# Patient Record
Sex: FEMALE | Race: WHITE | Employment: FULL TIME | URBAN - METROPOLITAN AREA
[De-identification: names, ages, dates, MRNs, and addresses within clinical notes are randomized per-mention and may not be internally consistent; named-entity substitution may affect disease eponyms.]

---

## 2018-06-29 ENCOUNTER — HOSPITAL ENCOUNTER (EMERGENCY)
Age: 33
Discharge: HOME | End: 2018-06-29
Attending: EMERGENCY MEDICINE
Payer: MEDICAID

## 2018-06-29 VITALS
TEMPERATURE: 98.7 F | BODY MASS INDEX: 33.75 KG/M2 | WEIGHT: 210 LBS | HEART RATE: 97 BPM | DIASTOLIC BLOOD PRESSURE: 80 MMHG | OXYGEN SATURATION: 97 % | HEIGHT: 66 IN | SYSTOLIC BLOOD PRESSURE: 120 MMHG | RESPIRATION RATE: 18 BRPM

## 2018-06-29 DIAGNOSIS — N20.1 LEFT URETERAL STONE: Primary | ICD-10-CM

## 2018-06-29 LAB
APPEARANCE UR: ABNORMAL
BACTERIA URNS QL MICRO: ABNORMAL /HPF
BILIRUB UR QL: NEGATIVE
COLOR UR: ABNORMAL
EPITH CASTS URNS QL MICRO: ABNORMAL /LPF
GLUCOSE UR STRIP.AUTO-MCNC: NEGATIVE MG/DL
HCG UR QL: NEGATIVE
HGB UR QL STRIP: ABNORMAL
KETONES UR QL STRIP.AUTO: ABNORMAL MG/DL
LEUKOCYTE ESTERASE UR QL STRIP.AUTO: ABNORMAL
NITRITE UR QL STRIP.AUTO: NEGATIVE
PH UR STRIP: 6.5 [PH] (ref 5–8)
PROT UR STRIP-MCNC: NEGATIVE MG/DL
RBC #/AREA URNS HPF: ABNORMAL /HPF (ref 0–5)
SP GR UR REFRACTOMETRY: 1.02 (ref 1–1.03)
UA: UC IF INDICATED,UAUC: ABNORMAL
UROBILINOGEN UR QL STRIP.AUTO: 0.2 EU/DL (ref 0.2–1)
WBC URNS QL MICRO: ABNORMAL /HPF (ref 0–4)

## 2018-06-29 PROCEDURE — 81001 URINALYSIS AUTO W/SCOPE: CPT | Performed by: EMERGENCY MEDICINE

## 2018-06-29 PROCEDURE — 96374 THER/PROPH/DIAG INJ IV PUSH: CPT

## 2018-06-29 PROCEDURE — 96361 HYDRATE IV INFUSION ADD-ON: CPT

## 2018-06-29 PROCEDURE — 74011250636 HC RX REV CODE- 250/636: Performed by: EMERGENCY MEDICINE

## 2018-06-29 PROCEDURE — 96375 TX/PRO/DX INJ NEW DRUG ADDON: CPT

## 2018-06-29 PROCEDURE — 99285 EMERGENCY DEPT VISIT HI MDM: CPT

## 2018-06-29 PROCEDURE — 81025 URINE PREGNANCY TEST: CPT

## 2018-06-29 PROCEDURE — 74011250636 HC RX REV CODE- 250/636

## 2018-06-29 PROCEDURE — 87086 URINE CULTURE/COLONY COUNT: CPT | Performed by: EMERGENCY MEDICINE

## 2018-06-29 RX ORDER — TAMSULOSIN HYDROCHLORIDE 0.4 MG/1
0.4 CAPSULE ORAL DAILY
COMMUNITY
End: 2021-01-28

## 2018-06-29 RX ORDER — OXYCODONE AND ACETAMINOPHEN 5; 325 MG/1; MG/1
1 TABLET ORAL
COMMUNITY
End: 2018-06-29

## 2018-06-29 RX ORDER — ONDANSETRON 4 MG/1
4 TABLET, FILM COATED ORAL
COMMUNITY
End: 2021-01-28

## 2018-06-29 RX ORDER — KETOROLAC TROMETHAMINE 30 MG/ML
30 INJECTION, SOLUTION INTRAMUSCULAR; INTRAVENOUS
Status: COMPLETED | OUTPATIENT
Start: 2018-06-29 | End: 2018-06-29

## 2018-06-29 RX ORDER — HYDROCODONE BITARTRATE AND ACETAMINOPHEN 5; 325 MG/1; MG/1
1 TABLET ORAL
Qty: 20 TAB | Refills: 0 | Status: SHIPPED | OUTPATIENT
Start: 2018-06-29 | End: 2021-01-28

## 2018-06-29 RX ORDER — FENTANYL CITRATE 50 UG/ML
INJECTION, SOLUTION INTRAMUSCULAR; INTRAVENOUS
Status: DISCONTINUED
Start: 2018-06-29 | End: 2018-06-30 | Stop reason: HOSPADM

## 2018-06-29 RX ORDER — ONDANSETRON 2 MG/ML
4 INJECTION INTRAMUSCULAR; INTRAVENOUS
Status: COMPLETED | OUTPATIENT
Start: 2018-06-29 | End: 2018-06-29

## 2018-06-29 RX ORDER — FENTANYL CITRATE 50 UG/ML
50 INJECTION, SOLUTION INTRAMUSCULAR; INTRAVENOUS ONCE
Status: COMPLETED | OUTPATIENT
Start: 2018-06-29 | End: 2018-06-29

## 2018-06-29 RX ORDER — ONDANSETRON 2 MG/ML
INJECTION INTRAMUSCULAR; INTRAVENOUS
Status: DISPENSED
Start: 2018-06-29 | End: 2018-06-30

## 2018-06-29 RX ORDER — KETOROLAC TROMETHAMINE 30 MG/ML
INJECTION, SOLUTION INTRAMUSCULAR; INTRAVENOUS
Status: DISPENSED
Start: 2018-06-29 | End: 2018-06-30

## 2018-06-29 RX ADMIN — SODIUM CHLORIDE 1000 ML: 900 INJECTION, SOLUTION INTRAVENOUS at 21:56

## 2018-06-29 RX ADMIN — ONDANSETRON 4 MG: 2 INJECTION INTRAMUSCULAR; INTRAVENOUS at 21:56

## 2018-06-29 RX ADMIN — FENTANYL CITRATE 50 MCG: 50 INJECTION, SOLUTION INTRAMUSCULAR; INTRAVENOUS at 22:43

## 2018-06-29 RX ADMIN — KETOROLAC TROMETHAMINE 30 MG: 30 INJECTION, SOLUTION INTRAMUSCULAR; INTRAVENOUS at 21:56

## 2018-06-30 NOTE — ED TRIAGE NOTES
Pt reports she was seen in [facility] ED last night and diagnosed with 7 mm kidney stone in left side. Pt unable to get into her urologist until Tuesday and the pain has worsened.

## 2018-06-30 NOTE — DISCHARGE INSTRUCTIONS

## 2018-06-30 NOTE — ED PROVIDER NOTES
HPI Comments: Patient is a 28year old female with a past medical history significant for kidney stones, CKD and a past surgical history of , Kidney Stone Removal who presents to the ED c/o constant, worsening, left flank pain which radiates to her LLQ today. Pt states that she first had the pain yesterday after lunch and was seen at [facility] last night. She was diagnosed with a 7mm kidney stone and prescribed zofran, flomax and percocet. She reports that she took a percocet this morning and her pain improved, but she has been having itching (side effect of perocet) so she is unable to tolerate anymore of the pain medicine. Pt states her pain is worse w/palpation and movement. She has contacted [facility] and they cannot see her until Tuesday. Additionally, pt c/o difficulty urinating,HA, decreased urination and nausea. She denies any fever, vomiting, diarrhea, cp, sob or any other acute sx. Pt is a current every day smoker, reports occasional EtOH. There are no additional medical complaints at this time. Signed by: terry Asencio for Massiel Mendoza on 2018. The history is provided by the patient. No  was used. No past medical history on file. No past surgical history on file. No family history on file. Social History     Social History    Marital status: N/A     Spouse name: N/A    Number of children: N/A    Years of education: N/A     Occupational History    Not on file. Social History Main Topics    Smoking status: Not on file    Smokeless tobacco: Not on file    Alcohol use Not on file    Drug use: Not on file    Sexual activity: Not on file     Other Topics Concern    Not on file     Social History Narrative         ALLERGIES: Ciprofloxacin    Review of Systems   Constitutional: Negative for chills and fever. HENT: Negative for congestion and sore throat.     Respiratory: Negative for cough and shortness of breath. Cardiovascular: Negative for chest pain, palpitations and leg swelling. Gastrointestinal: Negative for abdominal pain, blood in stool, constipation, diarrhea, nausea and vomiting. Genitourinary: Positive for decreased urine volume, difficulty urinating and flank pain. Negative for hematuria. Musculoskeletal: Negative for back pain. Neurological: Positive for syncope and headaches. Negative for numbness. All other systems reviewed and are negative. Vitals:    06/29/18 2126 06/29/18 2137   BP: 146/75    Pulse: 97    Resp: 18    Temp: 98.7 °F (37.1 °C)    SpO2: 99% 99%   Weight: 95.3 kg (210 lb)    Height: 5' 5.5\" (1.664 m)             Physical Exam   Constitutional: She is oriented to person, place, and time. She appears well-developed and well-nourished. She appears distressed. HENT:   Head: Normocephalic and atraumatic. Right Ear: External ear normal.   Left Ear: External ear normal.   Nose: Nose normal.   Mouth/Throat: Oropharynx is clear and moist.   Eyes: Conjunctivae and EOM are normal. Pupils are equal, round, and reactive to light. Right eye exhibits no discharge. Left eye exhibits no discharge. Neck: Normal range of motion. Neck supple. Cardiovascular: Normal rate, regular rhythm, normal heart sounds and intact distal pulses. Pulmonary/Chest: Effort normal and breath sounds normal.   Abdominal: Soft. Bowel sounds are normal. She exhibits no distension. There is tenderness in the left lower quadrant. There is no rebound and no guarding. Musculoskeletal: Normal range of motion. She exhibits no edema or tenderness. Neurological: She is alert and oriented to person, place, and time. No cranial nerve deficit. Coordination normal.   Skin: Skin is warm and dry. No rash noted. Psychiatric: She has a normal mood and affect. Her behavior is normal. Judgment and thought content normal.   Nursing note and vitals reviewed.        MDM      ED Course       Procedures    Progress Note:  Results, treatment, and follow up plan have been discussed with patient. Questions were answered. She feels better. Sohail Mcneil MD    Consult note - spoke with urology on call; agrees with plan for discharge; recommends she call the office at 0830 tomorrow morning. Sohail Mcneil MD     A/P: 7 mm left ureteral stone; pain controlled in ED; reassuring appearance and exam; VSS; home with urology f/u.   Sohail Mcneil MD

## 2018-06-30 NOTE — ED NOTES
Assumed care of pt from triage. Pt presents to ED with chief complaint of left flank pain. Pt is A&O x 4. Pt denies any other symptoms at this time. Pt resting comfortably on the stretcher in a position of comfort. Pt in no acute distress at this time. Call bell within reach. Side rails x 2. Cardiac monitor x 2. Stretcher locked in the lowest position. Pt aware of plan to await for MD/PA-C/NP assessment, and pt/family verbalizes understanding. Will continue to monitor.

## 2018-06-30 NOTE — ED NOTES
Dr. Cameron Blount gave and reviewed discharge instructions with the patient. The patient verbalized understanding. The patient was given opportunity for questions. Patient discharged in stable condition to the waiting room with male visitor.

## 2018-07-01 LAB
BACTERIA SPEC CULT: NORMAL
CC UR VC: NORMAL
SERVICE CMNT-IMP: NORMAL

## 2018-07-02 ENCOUNTER — ED HISTORICAL/CONVERTED ENCOUNTER (OUTPATIENT)
Dept: OTHER | Age: 33
End: 2018-07-02

## 2020-12-18 ENCOUNTER — HOSPITAL ENCOUNTER (EMERGENCY)
Age: 35
Discharge: HOME | End: 2020-12-19
Payer: MEDICAID

## 2020-12-18 ENCOUNTER — APPOINTMENT (OUTPATIENT)
Dept: CT IMAGING | Age: 35
End: 2020-12-18
Attending: NURSE PRACTITIONER
Payer: MEDICAID

## 2020-12-18 VITALS
RESPIRATION RATE: 21 BRPM | WEIGHT: 230 LBS | OXYGEN SATURATION: 98 % | TEMPERATURE: 99.1 F | HEIGHT: 66 IN | BODY MASS INDEX: 36.96 KG/M2 | SYSTOLIC BLOOD PRESSURE: 169 MMHG | DIASTOLIC BLOOD PRESSURE: 106 MMHG | HEART RATE: 109 BPM

## 2020-12-18 DIAGNOSIS — N13.5 OBSTRUCTION OF LEFT URETER: ICD-10-CM

## 2020-12-18 DIAGNOSIS — N13.30 HYDROURETERONEPHROSIS: ICD-10-CM

## 2020-12-18 DIAGNOSIS — N20.0 NEPHROLITHIASIS: ICD-10-CM

## 2020-12-18 DIAGNOSIS — R10.9 ACUTE LEFT FLANK PAIN: Primary | ICD-10-CM

## 2020-12-18 LAB
ALBUMIN SERPL-MCNC: 3.8 G/DL (ref 3.5–5)
ALBUMIN/GLOB SERPL: 1 {RATIO} (ref 1.1–2.2)
ALP SERPL-CCNC: 109 U/L (ref 45–117)
ALT SERPL-CCNC: 15 U/L (ref 12–78)
ANION GAP SERPL CALC-SCNC: 7 MMOL/L (ref 5–15)
ANION GAP SERPL CALC-SCNC: 7 MMOL/L (ref 5–15)
APPEARANCE UR: ABNORMAL
AST SERPL W P-5'-P-CCNC: 16 U/L (ref 15–37)
BACTERIA URNS QL MICRO: NEGATIVE /HPF
BASOPHILS # BLD: 0 K/UL (ref 0–0.1)
BASOPHILS NFR BLD: 0 % (ref 0–1)
BILIRUB SERPL-MCNC: 0.3 MG/DL (ref 0.2–1)
BILIRUB UR QL: NEGATIVE
BUN SERPL-MCNC: 16 MG/DL (ref 6–20)
BUN SERPL-MCNC: 17 MG/DL (ref 6–20)
BUN/CREAT SERPL: 15 (ref 12–20)
BUN/CREAT SERPL: 15 (ref 12–20)
CA-I BLD-MCNC: 9.8 MG/DL (ref 8.5–10.1)
CA-I BLD-MCNC: 9.8 MG/DL (ref 8.5–10.1)
CHLORIDE SERPL-SCNC: 108 MMOL/L (ref 97–108)
CHLORIDE SERPL-SCNC: 108 MMOL/L (ref 97–108)
CO2 SERPL-SCNC: 25 MMOL/L (ref 21–32)
CO2 SERPL-SCNC: 25 MMOL/L (ref 21–32)
COLOR UR: ABNORMAL
CREAT SERPL-MCNC: 1.1 MG/DL (ref 0.55–1.02)
CREAT SERPL-MCNC: 1.16 MG/DL (ref 0.55–1.02)
DIFFERENTIAL METHOD BLD: ABNORMAL
EOSINOPHIL # BLD: 0.6 K/UL (ref 0–0.4)
EOSINOPHIL NFR BLD: 4 % (ref 0–7)
ERYTHROCYTE [DISTWIDTH] IN BLOOD BY AUTOMATED COUNT: 16.2 % (ref 11.5–14.5)
GLOBULIN SER CALC-MCNC: 4 G/DL (ref 2–4)
GLUCOSE SERPL-MCNC: 147 MG/DL (ref 65–100)
GLUCOSE SERPL-MCNC: 147 MG/DL (ref 65–100)
GLUCOSE UR STRIP.AUTO-MCNC: NEGATIVE MG/DL
HCT VFR BLD AUTO: 39.1 % (ref 35–47)
HGB BLD-MCNC: 12.1 G/DL (ref 11.5–16)
HGB UR QL STRIP: ABNORMAL
IMM GRANULOCYTES # BLD AUTO: 0 K/UL (ref 0–0.04)
IMM GRANULOCYTES NFR BLD AUTO: 0 % (ref 0–0.5)
KETONES UR QL STRIP.AUTO: NEGATIVE MG/DL
LEUKOCYTE ESTERASE UR QL STRIP.AUTO: ABNORMAL
LYMPHOCYTES # BLD: 2.6 K/UL (ref 0.8–3.5)
LYMPHOCYTES NFR BLD: 19 % (ref 12–49)
MCH RBC QN AUTO: 25.3 PG (ref 26–34)
MCHC RBC AUTO-ENTMCNC: 30.9 G/DL (ref 30–36.5)
MCV RBC AUTO: 81.8 FL (ref 80–99)
MONOCYTES # BLD: 0.8 K/UL (ref 0–1)
MONOCYTES NFR BLD: 6 % (ref 5–13)
MUCOUS THREADS URNS QL MICRO: ABNORMAL /LPF
NEUTS SEG # BLD: 10.2 K/UL (ref 1.8–8)
NEUTS SEG NFR BLD: 71 % (ref 32–75)
NITRITE UR QL STRIP.AUTO: NEGATIVE
PH UR STRIP: 5 [PH] (ref 5–8)
PLATELET # BLD AUTO: 401 K/UL (ref 150–400)
PMV BLD AUTO: 10.7 FL (ref 8.9–12.9)
POTASSIUM SERPL-SCNC: 3.8 MMOL/L (ref 3.5–5.1)
POTASSIUM SERPL-SCNC: 3.9 MMOL/L (ref 3.5–5.1)
PROT SERPL-MCNC: 7.8 G/DL (ref 6.4–8.2)
PROT UR STRIP-MCNC: 30 MG/DL
RBC # BLD AUTO: 4.78 M/UL (ref 3.8–5.2)
RBC #/AREA URNS HPF: ABNORMAL /HPF (ref 0–5)
SODIUM SERPL-SCNC: 140 MMOL/L (ref 136–145)
SODIUM SERPL-SCNC: 140 MMOL/L (ref 136–145)
SP GR UR REFRACTOMETRY: 1.02 (ref 1–1.03)
UA: UC IF INDICATED,UAUC: ABNORMAL
UROBILINOGEN UR QL STRIP.AUTO: 0.1 EU/DL (ref 0.1–1)
WBC # BLD AUTO: 14.3 K/UL (ref 3.6–11)
WBC URNS QL MICRO: ABNORMAL /HPF (ref 0–4)

## 2020-12-18 PROCEDURE — 81001 URINALYSIS AUTO W/SCOPE: CPT

## 2020-12-18 PROCEDURE — 99283 EMERGENCY DEPT VISIT LOW MDM: CPT

## 2020-12-18 PROCEDURE — 96374 THER/PROPH/DIAG INJ IV PUSH: CPT

## 2020-12-18 PROCEDURE — 74011250636 HC RX REV CODE- 250/636: Performed by: NURSE PRACTITIONER

## 2020-12-18 PROCEDURE — 80053 COMPREHEN METABOLIC PANEL: CPT

## 2020-12-18 PROCEDURE — 85025 COMPLETE CBC W/AUTO DIFF WBC: CPT

## 2020-12-18 PROCEDURE — 36415 COLL VENOUS BLD VENIPUNCTURE: CPT

## 2020-12-18 PROCEDURE — 87086 URINE CULTURE/COLONY COUNT: CPT

## 2020-12-18 PROCEDURE — 84703 CHORIONIC GONADOTROPIN ASSAY: CPT

## 2020-12-18 PROCEDURE — 96375 TX/PRO/DX INJ NEW DRUG ADDON: CPT

## 2020-12-18 PROCEDURE — 80048 BASIC METABOLIC PNL TOTAL CA: CPT

## 2020-12-18 RX ORDER — ONDANSETRON 2 MG/ML
4 INJECTION INTRAMUSCULAR; INTRAVENOUS
Status: COMPLETED | OUTPATIENT
Start: 2020-12-18 | End: 2020-12-18

## 2020-12-18 RX ORDER — SODIUM CHLORIDE 0.9 % (FLUSH) 0.9 %
5-40 SYRINGE (ML) INJECTION AS NEEDED
Status: DISCONTINUED | OUTPATIENT
Start: 2020-12-18 | End: 2020-12-19 | Stop reason: HOSPADM

## 2020-12-18 RX ORDER — SODIUM CHLORIDE 0.9 % (FLUSH) 0.9 %
5-40 SYRINGE (ML) INJECTION EVERY 8 HOURS
Status: DISCONTINUED | OUTPATIENT
Start: 2020-12-18 | End: 2020-12-19 | Stop reason: HOSPADM

## 2020-12-18 RX ORDER — KETOROLAC TROMETHAMINE 30 MG/ML
15 INJECTION, SOLUTION INTRAMUSCULAR; INTRAVENOUS
Status: COMPLETED | OUTPATIENT
Start: 2020-12-18 | End: 2020-12-18

## 2020-12-18 RX ADMIN — Medication 10 ML: at 22:35

## 2020-12-18 RX ADMIN — ONDANSETRON 4 MG: 2 INJECTION INTRAMUSCULAR; INTRAVENOUS at 22:34

## 2020-12-18 RX ADMIN — Medication 10 ML: at 20:00

## 2020-12-18 RX ADMIN — KETOROLAC TROMETHAMINE 15 MG: 30 INJECTION, SOLUTION INTRAMUSCULAR at 22:33

## 2020-12-19 ENCOUNTER — APPOINTMENT (OUTPATIENT)
Dept: CT IMAGING | Age: 35
End: 2020-12-19
Attending: NURSE PRACTITIONER
Payer: MEDICAID

## 2020-12-19 LAB — HCG SERPL QL: NEGATIVE

## 2020-12-19 PROCEDURE — 74176 CT ABD & PELVIS W/O CONTRAST: CPT

## 2020-12-19 RX ORDER — TAMSULOSIN HYDROCHLORIDE 0.4 MG/1
0.4 CAPSULE ORAL DAILY
Qty: 15 CAP | Refills: 0 | Status: SHIPPED | OUTPATIENT
Start: 2020-12-19 | End: 2021-01-03

## 2020-12-19 RX ORDER — OXYCODONE HYDROCHLORIDE 5 MG/1
5 TABLET ORAL
Qty: 12 TAB | Refills: 0 | Status: SHIPPED | OUTPATIENT
Start: 2020-12-19 | End: 2020-12-22

## 2020-12-19 RX ORDER — CEPHALEXIN 500 MG/1
500 CAPSULE ORAL
Status: DISCONTINUED | OUTPATIENT
Start: 2020-12-19 | End: 2020-12-19 | Stop reason: HOSPADM

## 2020-12-19 RX ORDER — CEPHALEXIN 500 MG/1
500 CAPSULE ORAL 2 TIMES DAILY
Qty: 14 CAP | Refills: 0 | Status: SHIPPED | OUTPATIENT
Start: 2020-12-19 | End: 2020-12-26

## 2020-12-19 RX ORDER — IBUPROFEN 800 MG/1
800 TABLET ORAL
Qty: 30 TAB | Refills: 0 | Status: SHIPPED | OUTPATIENT
Start: 2020-12-19 | End: 2021-01-28

## 2020-12-19 RX ORDER — ONDANSETRON 4 MG/1
4 TABLET, ORALLY DISINTEGRATING ORAL
Qty: 10 TAB | Refills: 0 | Status: SHIPPED | OUTPATIENT
Start: 2020-12-19 | End: 2021-01-28

## 2020-12-19 NOTE — DISCHARGE INSTRUCTIONS
Thank you! Thank you for allowing me to care for you in the emergency department. I sincerely hope that you are satisfied with your visit today. It is my goal to provide you with excellent care. Below you will find a list of your labs and imaging from your visit today. Should you have any questions regarding these results please do not hesitate to call the emergency department.     Labs -     Recent Results (from the past 12 hour(s))   URINALYSIS W/ REFLEX CULTURE    Collection Time: 12/18/20 10:00 PM    Specimen: Urine   Result Value Ref Range    Color Yellow/Straw      Appearance Turbid (A) Clear      Specific gravity 1.019 1.003 - 1.030      pH (UA) 5.0 5.0 - 8.0      Protein 30 (A) Negative mg/dL    Glucose Negative Negative mg/dL    Ketone Negative Negative mg/dL    Bilirubin Negative Negative      Blood Moderate (A) Negative      Urobilinogen 0.1 0.1 - 1.0 EU/dL    Nitrites Negative Negative      Leukocyte Esterase Small (A) Negative      UA:UC IF INDICATED Urine Culture Ordered (A) Culture not indicated by UA result      WBC 20-50 0 - 4 /hpf    RBC  0 - 5 /hpf    Bacteria Negative Negative /hpf    Mucus Trace /lpf   METABOLIC PANEL, BASIC    Collection Time: 12/18/20 10:16 PM   Result Value Ref Range    Sodium 140 136 - 145 mmol/L    Potassium 3.8 3.5 - 5.1 mmol/L    Chloride 108 97 - 108 mmol/L    CO2 25 21 - 32 mmol/L    Anion gap 7 5 - 15 mmol/L    Glucose 147 (H) 65 - 100 mg/dL    BUN 16 6 - 20 mg/dL    Creatinine 1.10 (H) 0.55 - 1.02 mg/dL    BUN/Creatinine ratio 15 12 - 20      GFR est AA >60 >60 ml/min/1.73m2    GFR est non-AA 57 (L) >60 ml/min/1.73m2    Calcium 9.8 8.5 - 10.1 mg/dL   CBC WITH AUTOMATED DIFF    Collection Time: 12/18/20 10:16 PM   Result Value Ref Range    WBC 14.3 (H) 3.6 - 11.0 K/uL    RBC 4.78 3.80 - 5.20 M/uL    HGB 12.1 11.5 - 16.0 g/dL    HCT 39.1 35.0 - 47.0 %    MCV 81.8 80.0 - 99.0 FL    MCH 25.3 (L) 26.0 - 34.0 PG    MCHC 30.9 30.0 - 36.5 g/dL    RDW 16.2 (H) 11.5 - 14.5 %    PLATELET 047 (H) 220 - 400 K/uL    MPV 10.7 8.9 - 12.9 FL    NEUTROPHILS 71 32 - 75 %    LYMPHOCYTES 19 12 - 49 %    MONOCYTES 6 5 - 13 %    EOSINOPHILS 4 0 - 7 %    BASOPHILS 0 0 - 1 %    IMMATURE GRANULOCYTES 0 0.0 - 0.5 %    ABS. NEUTROPHILS 10.2 (H) 1.8 - 8.0 K/UL    ABS. LYMPHOCYTES 2.6 0.8 - 3.5 K/UL    ABS. MONOCYTES 0.8 0.0 - 1.0 K/UL    ABS. EOSINOPHILS 0.6 (H) 0.0 - 0.4 K/UL    ABS. BASOPHILS 0.0 0.0 - 0.1 K/UL    ABS. IMM. GRANS. 0.0 0.00 - 0.04 K/UL    DF AUTOMATED     HCG QL SERUM    Collection Time: 12/18/20 10:16 PM   Result Value Ref Range    HCG, Ql. Negative Negative     METABOLIC PANEL, COMPREHENSIVE    Collection Time: 12/18/20 10:50 PM   Result Value Ref Range    Sodium 140 136 - 145 mmol/L    Potassium 3.9 3.5 - 5.1 mmol/L    Chloride 108 97 - 108 mmol/L    CO2 25 21 - 32 mmol/L    Anion gap 7 5 - 15 mmol/L    Glucose 147 (H) 65 - 100 mg/dL    BUN 17 6 - 20 mg/dL    Creatinine 1.16 (H) 0.55 - 1.02 mg/dL    BUN/Creatinine ratio 15 12 - 20      GFR est AA >60 >60 ml/min/1.73m2    GFR est non-AA 53 (L) >60 ml/min/1.73m2    Calcium 9.8 8.5 - 10.1 mg/dL    Bilirubin, total 0.3 0.2 - 1.0 mg/dL    AST (SGOT) 16 15 - 37 U/L    ALT (SGPT) 15 12 - 78 U/L    Alk. phosphatase 109 45 - 117 U/L    Protein, total 7.8 6.4 - 8.2 g/dL    Albumin 3.8 3.5 - 5.0 g/dL    Globulin 4.0 2.0 - 4.0 g/dL    A-G Ratio 1.0 (L) 1.1 - 2.2         Radiologic Studies -   CT ABD PELV WO CONT   Final Result   IMPRESSION: Obstructing stone in the proximal left ureter as described and with   multiple other bilateral renal calculi.  Based on the current size of the   obstructing ureteral calculus urology consult may be needed for retrieval.   Additionally the patient's overall stone disease has significantly increased   since the prior exam.        CT Results  (Last 48 hours)                 12/19/20 0101  CT ABD PELV WO CONT Final result    Impression:  IMPRESSION: Obstructing stone in the proximal left ureter as described and with   multiple other bilateral renal calculi. Based on the current size of the   obstructing ureteral calculus urology consult may be needed for retrieval.   Additionally the patient's overall stone disease has significantly increased   since the prior exam.       Narrative:  HISTORY:   flank pain; flank pain     Dose reduction technique: All CT scans at this facility are performed using dose reduction optimization   technique as appropriate on the exam including the following: Automated exposure   control, adjustment of the MA and/or KV according to patient size of use of   iterative reconstructive technique. .       TECHNIQUE: CT of the abdomen and pelvis without contrast   COMPARISON: None   LIMITATIONS: None       CHEST: No acute airspace process or pleural effusion seen at the lung bases. LIVER: Normal.        GALLBLADDER: Normal.        BILIARY TREE: Normal.           PANCREAS: Normal.            SPLEEN: Normal.           ADRENAL GLANDS: Normal.       KIDNEYS/URETERS/BLADDER: There is a 5.6 mm maximal transaxial diameter stone in   the proximal left ureter with moderate left hydroureteronephrosis and mild left   perinephric/periureteral stranding. There are multiple other bilateral renal   calculi present and overall stone disease has increased since the prior exam   with multiple new stones in the right kidney and increase in the size of the   stones of the left kidney. The largest stone in the left kidney is present in   the inferior pole and measures up to 6.5 mm. Right kidney/ureter negative for   obstruction. Urinary bladder is collapsed and otherwise unremarkable. RETROPERITONEUM/AORTA: Normal.      BOWEL/MESENTERY: Normal.         APPENDIX: Identified and normal.         PERITONEAL CAVITY: Normal.          REPRODUCTIVE ORGANS: Normal. Bilateral tubal ligation clips noted       BONE/TISSUES: No acute abnormality. OTHER: None.                              CXR Results  (Last 48 hours)      None               If you feel that you have not received excellent quality care or timely care, please ask to speak to the nurse manager. Please choose us in the future for your continued health care needs. ------------------------------------------------------------------------------------------------------------  The exam and treatment you received in the Emergency Department were for an urgent problem and are not intended as complete care. It is important that you follow-up with a doctor, nurse practitioner, or physician assistant to:  (1) confirm your diagnosis,  (2) re-evaluation of changes in your illness and treatment, and  (3) for ongoing care. If your symptoms become worse or you do not improve as expected and you are unable to reach your usual health care provider, you should return to the Emergency Department. We are available 24 hours a day. Please take your discharge instructions with you when you go to your follow-up appointment. If you have any problem arranging a follow-up appointment, contact the Emergency Department immediately. If a prescription has been provided, please have it filled as soon as possible to prevent a delay in treatment. Read the entire medication instruction sheet provided to you by the pharmacy. If you have any questions or reservations about taking the medication due to side effects or interactions with other medications, please call your primary care physician or contact the ER to speak with the charge nurse. Make an appointment with your family doctor or the physician you were referred to for follow-up of this visit as instructed on your discharge paperwork, as this is a mandatory follow-up. Return to the ER if you are unable to be seen or if you are unable to be seen in a timely manner. If you have any problem arranging the follow-up visit, contact the Emergency Department immediately.

## 2020-12-19 NOTE — ED TRIAGE NOTES
Patient states 3 weeks ago she was told he had a 7mm Kidney stone. Pain improved till today. Patient complains of left flank pain radiating to the abdomen.  Denies blood in the urine

## 2020-12-19 NOTE — ED PROVIDER NOTES
EMERGENCY DEPARTMENT HISTORY AND PHYSICAL EXAM      Date: 2020  Patient Name: Karon Sneed      History of Presenting Illness     Chief Complaint   Patient presents with    Flank Pain       History Provided By: Patient    HPI: Karon Sneed, 28 y.o. female with a past medical history significant Kidney stones, , tubal ligation, lithotripsy presents to the ED with cc of left-sided flank pain that radiates to her abdomen and nausea. She reports pain is 9 out of 10, constant, sharp and nagging. Reports pain started today. Denies anything making it better or worse. Reports being told she had a 7 mm kidney stone around gi. Denies being able to follow-up with kidney stone hotline or urologist.  She denies any burning, frequency, urgency, fever, chills. She reports use of Motrin Aleve and Tylenol as needed with no relief. Off note patient reports history of kidney stones requiring lithotripsy. Patient requesting additional imaging to rule out any type of obstruction at this time. There are no other complaints, changes, or physical findings at this time. PCP: Gretchen Cherry MD    Current Facility-Administered Medications   Medication Dose Route Frequency Provider Last Rate Last Admin    cephALEXin (KEFLEX) capsule 500 mg  500 mg Oral NOW Kat Jacques, NP        sodium chloride (NS) flush 5-40 mL  5-40 mL IntraVENous Q8H Janel PERES MD   10 mL at 20    sodium chloride (NS) flush 5-40 mL  5-40 mL IntraVENous PRN Hernán Lizarraga MD   10 mL at 20     Current Outpatient Medications   Medication Sig Dispense Refill    cephALEXin (Keflex) 500 mg capsule Take 1 Cap by mouth two (2) times a day for 7 days. 14 Cap 0    oxyCODONE IR (Roxicodone) 5 mg immediate release tablet Take 1 Tab by mouth every six (6) hours as needed for Pain for up to 3 days.  Max Daily Amount: 20 mg. 12 Tab 0    ibuprofen (MOTRIN) 800 mg tablet Take 1 Tab by mouth every eight (8) hours as needed for Pain. 30 Tab 0    ondansetron (Zofran ODT) 4 mg disintegrating tablet Take 1 Tab by mouth every eight (8) hours as needed for Nausea. 10 Tab 0    tamsulosin (Flomax) 0.4 mg capsule Take 1 Cap by mouth daily for 15 days. 15 Cap 0    tamsulosin (FLOMAX) 0.4 mg capsule Take 0.4 mg by mouth daily.  ondansetron hcl (ZOFRAN) 4 mg tablet Take 4 mg by mouth every six (6) hours as needed for Nausea.  HYDROcodone-acetaminophen (NORCO) 5-325 mg per tablet Take 1 Tab by mouth every four (4) hours as needed for Pain. Max Daily Amount: 6 Tabs. 20 Tab 0       Past History     Past Medical History:  Past Medical History:   Diagnosis Date    Kidney calculi        Past Surgical History:  Past Surgical History:   Procedure Laterality Date    HX  SECTION         Family History:  History reviewed. No pertinent family history. Social History:  Social History     Tobacco Use    Smoking status: Current Every Day Smoker     Packs/day: 0.50     Years: 10.00     Pack years: 5.00    Smokeless tobacco: Current User   Substance Use Topics    Alcohol use: No     Comment: rarely    Drug use: No       Allergies: Allergies   Allergen Reactions    Ciprofloxacin Other (comments)     Thrush         Review of Systems     Review of Systems   Constitutional: Negative for chills and fever. Respiratory: Negative for cough and shortness of breath. Cardiovascular: Negative for chest pain. Gastrointestinal: Positive for abdominal pain. Genitourinary: Positive for flank pain. Negative for difficulty urinating, dysuria, frequency and hematuria. Musculoskeletal: Positive for back pain. Skin: Negative. Physical Exam     Physical Exam  Constitutional:       General: She is not in acute distress. Appearance: Normal appearance. She is not ill-appearing. HENT:      Head: Normocephalic and atraumatic.       Mouth/Throat:      Mouth: Mucous membranes are moist.   Eyes:      Extraocular Movements: Extraocular movements intact. Cardiovascular:      Rate and Rhythm: Normal rate and regular rhythm. Pulses: Normal pulses. Pulmonary:      Effort: Pulmonary effort is normal.   Abdominal:      General: Bowel sounds are normal.      Palpations: Abdomen is soft. Tenderness: There is abdominal tenderness in the left upper quadrant. There is left CVA tenderness. There is no right CVA tenderness. Musculoskeletal: Normal range of motion. Skin:     General: Skin is warm and dry. Capillary Refill: Capillary refill takes less than 2 seconds. Neurological:      Mental Status: She is alert and oriented to person, place, and time.          Lab and Diagnostic Study Results     Labs -     Recent Results (from the past 12 hour(s))   URINALYSIS W/ REFLEX CULTURE    Collection Time: 12/18/20 10:00 PM    Specimen: Urine   Result Value Ref Range    Color Yellow/Straw      Appearance Turbid (A) Clear      Specific gravity 1.019 1.003 - 1.030      pH (UA) 5.0 5.0 - 8.0      Protein 30 (A) Negative mg/dL    Glucose Negative Negative mg/dL    Ketone Negative Negative mg/dL    Bilirubin Negative Negative      Blood Moderate (A) Negative      Urobilinogen 0.1 0.1 - 1.0 EU/dL    Nitrites Negative Negative      Leukocyte Esterase Small (A) Negative      UA:UC IF INDICATED Urine Culture Ordered (A) Culture not indicated by UA result      WBC 20-50 0 - 4 /hpf    RBC  0 - 5 /hpf    Bacteria Negative Negative /hpf    Mucus Trace /lpf   METABOLIC PANEL, BASIC    Collection Time: 12/18/20 10:16 PM   Result Value Ref Range    Sodium 140 136 - 145 mmol/L    Potassium 3.8 3.5 - 5.1 mmol/L    Chloride 108 97 - 108 mmol/L    CO2 25 21 - 32 mmol/L    Anion gap 7 5 - 15 mmol/L    Glucose 147 (H) 65 - 100 mg/dL    BUN 16 6 - 20 mg/dL    Creatinine 1.10 (H) 0.55 - 1.02 mg/dL    BUN/Creatinine ratio 15 12 - 20      GFR est AA >60 >60 ml/min/1.73m2    GFR est non-AA 57 (L) >60 ml/min/1.73m2    Calcium 9.8 8.5 - 10.1 mg/dL   CBC WITH AUTOMATED DIFF    Collection Time: 12/18/20 10:16 PM   Result Value Ref Range    WBC 14.3 (H) 3.6 - 11.0 K/uL    RBC 4.78 3.80 - 5.20 M/uL    HGB 12.1 11.5 - 16.0 g/dL    HCT 39.1 35.0 - 47.0 %    MCV 81.8 80.0 - 99.0 FL    MCH 25.3 (L) 26.0 - 34.0 PG    MCHC 30.9 30.0 - 36.5 g/dL    RDW 16.2 (H) 11.5 - 14.5 %    PLATELET 528 (H) 436 - 400 K/uL    MPV 10.7 8.9 - 12.9 FL    NEUTROPHILS 71 32 - 75 %    LYMPHOCYTES 19 12 - 49 %    MONOCYTES 6 5 - 13 %    EOSINOPHILS 4 0 - 7 %    BASOPHILS 0 0 - 1 %    IMMATURE GRANULOCYTES 0 0.0 - 0.5 %    ABS. NEUTROPHILS 10.2 (H) 1.8 - 8.0 K/UL    ABS. LYMPHOCYTES 2.6 0.8 - 3.5 K/UL    ABS. MONOCYTES 0.8 0.0 - 1.0 K/UL    ABS. EOSINOPHILS 0.6 (H) 0.0 - 0.4 K/UL    ABS. BASOPHILS 0.0 0.0 - 0.1 K/UL    ABS. IMM. GRANS. 0.0 0.00 - 0.04 K/UL    DF AUTOMATED     HCG QL SERUM    Collection Time: 12/18/20 10:16 PM   Result Value Ref Range    HCG, Ql. Negative Negative     METABOLIC PANEL, COMPREHENSIVE    Collection Time: 12/18/20 10:50 PM   Result Value Ref Range    Sodium 140 136 - 145 mmol/L    Potassium 3.9 3.5 - 5.1 mmol/L    Chloride 108 97 - 108 mmol/L    CO2 25 21 - 32 mmol/L    Anion gap 7 5 - 15 mmol/L    Glucose 147 (H) 65 - 100 mg/dL    BUN 17 6 - 20 mg/dL    Creatinine 1.16 (H) 0.55 - 1.02 mg/dL    BUN/Creatinine ratio 15 12 - 20      GFR est AA >60 >60 ml/min/1.73m2    GFR est non-AA 53 (L) >60 ml/min/1.73m2    Calcium 9.8 8.5 - 10.1 mg/dL    Bilirubin, total 0.3 0.2 - 1.0 mg/dL    AST (SGOT) 16 15 - 37 U/L    ALT (SGPT) 15 12 - 78 U/L    Alk. phosphatase 109 45 - 117 U/L    Protein, total 7.8 6.4 - 8.2 g/dL    Albumin 3.8 3.5 - 5.0 g/dL    Globulin 4.0 2.0 - 4.0 g/dL    A-G Ratio 1.0 (L) 1.1 - 2.2         Radiologic Studies -   [unfilled]  CT Results  (Last 48 hours)               12/19/20 0101  CT ABD PELV WO CONT Final result    Impression:  IMPRESSION: Obstructing stone in the proximal left ureter as described and with   multiple other bilateral renal calculi. Based on the current size of the   obstructing ureteral calculus urology consult may be needed for retrieval.   Additionally the patient's overall stone disease has significantly increased   since the prior exam.       Narrative:  HISTORY:   flank pain; flank pain     Dose reduction technique: All CT scans at this facility are performed using dose reduction optimization   technique as appropriate on the exam including the following: Automated exposure   control, adjustment of the MA and/or KV according to patient size of use of   iterative reconstructive technique. .       TECHNIQUE: CT of the abdomen and pelvis without contrast   COMPARISON: None   LIMITATIONS: None       CHEST: No acute airspace process or pleural effusion seen at the lung bases. LIVER: Normal.        GALLBLADDER: Normal.        BILIARY TREE: Normal.           PANCREAS: Normal.            SPLEEN: Normal.           ADRENAL GLANDS: Normal.       KIDNEYS/URETERS/BLADDER: There is a 5.6 mm maximal transaxial diameter stone in   the proximal left ureter with moderate left hydroureteronephrosis and mild left   perinephric/periureteral stranding. There are multiple other bilateral renal   calculi present and overall stone disease has increased since the prior exam   with multiple new stones in the right kidney and increase in the size of the   stones of the left kidney. The largest stone in the left kidney is present in   the inferior pole and measures up to 6.5 mm. Right kidney/ureter negative for   obstruction. Urinary bladder is collapsed and otherwise unremarkable. RETROPERITONEUM/AORTA: Normal.      BOWEL/MESENTERY: Normal.         APPENDIX: Identified and normal.         PERITONEAL CAVITY: Normal.          REPRODUCTIVE ORGANS: Normal. Bilateral tubal ligation clips noted       BONE/TISSUES: No acute abnormality. OTHER: None.                            CXR Results  (Last 48 hours)    None          Medical Decision Making and ED Course   - I am the first and primary provider for this patient AND AM THE PRIMARY PROVIDER OF RECORD. - I reviewed the vital signs, available nursing notes, past medical history, past surgical history, family history and social history. - Initial assessment performed. The patients presenting problems have been discussed, and the staff are in agreement with the care plan formulated and outlined with them. I have encouraged them to ask questions as they arise throughout their visit. Vital Signs-Reviewed the patient's vital signs. Patient Vitals for the past 12 hrs:   Temp Pulse Resp BP SpO2   12/18/20 2141 99.1 °F (37.3 °C) (!) 109 21 (!) 169/106 98 %         The patient presents with flank pain with a differential diagnosis of pylonephritis, hydronephritis, renal calculi, UTI    ED Course:              Provider Notes (Medical Decision Making):   CT noted with positive left ureter obstruction. UA with blood noted mild leukocytes. WBC 14.  Patient denies any fever, chills. Positive CVA tenderness on left side. Patient with past medical history of kidney stones recurrent, recent during Thanksgiving however denies follow-up with urologist or kidney stone hotline. Advised of pain management, supportive care, follow-up with urologist outpatient. Patient given Flomax and antibiotics prophylactic. During ED visit patient complains of pain 9 out of 10 treated with Toradol with minimal relief. Patient was offered use of narcotics for pain management however ,patient reports currently driving. Patient was advised to have someone pick her up or she can assist stay for a few hours and rest until the morning deferred the option to have someone pick her up or to stay for several hours to be monitored for pain management. Advised of signs and symptoms of when to return to the emergency room. Patient verbalized understanding stable at time of discharge.           Consultations: Consultations:         Procedures and Metsa 36, NP        Disposition     Disposition: Discharge      DISCHARGE PLAN:  1. Current Discharge Medication List      CONTINUE these medications which have NOT CHANGED    Details   tamsulosin (FLOMAX) 0.4 mg capsule Take 0.4 mg by mouth daily. ondansetron hcl (ZOFRAN) 4 mg tablet Take 4 mg by mouth every six (6) hours as needed for Nausea. HYDROcodone-acetaminophen (NORCO) 5-325 mg per tablet Take 1 Tab by mouth every four (4) hours as needed for Pain. Max Daily Amount: 6 Tabs. Qty: 20 Tab, Refills: 0    Associated Diagnoses: Left ureteral stone           2. Follow-up Information     Follow up With Specialties Details Why Contact Info    Jason Amaya MD Internal Medicine Schedule an appointment as soon as possible for a visit   [place] 1998 [id number]  [address] [facility]  Schedule an appointment as soon as possible for a visit   [address]  Schedule an appointment as soon as possible for a visit   [phone]        3. Return to ED if worse   4. Current Discharge Medication List      START taking these medications    Details   cephALEXin (Keflex) 500 mg capsule Take 1 Cap by mouth two (2) times a day for 7 days. Qty: 14 Cap, Refills: 0      oxyCODONE IR (Roxicodone) 5 mg immediate release tablet Take 1 Tab by mouth every six (6) hours as needed for Pain for up to 3 days. Max Daily Amount: 20 mg.  Qty: 12 Tab, Refills: 0    Associated Diagnoses: Nephrolithiasis; Obstruction of left ureter      ibuprofen (MOTRIN) 800 mg tablet Take 1 Tab by mouth every eight (8) hours as needed for Pain. Qty: 30 Tab, Refills: 0      ondansetron (Zofran ODT) 4 mg disintegrating tablet Take 1 Tab by mouth every eight (8) hours as needed for Nausea.   Qty: 10 Tab, Refills: 0      !! tamsulosin (Flomax) 0.4 mg capsule Take 1 Cap by mouth daily for 15 days. Qty: 15 Cap, Refills: 0       !! - Potential duplicate medications found. Please discuss with provider. CONTINUE these medications which have NOT CHANGED    Details   !! tamsulosin (FLOMAX) 0.4 mg capsule Take 0.4 mg by mouth daily. !! - Potential duplicate medications found. Please discuss with provider. Diagnosis     Clinical Impression:   1. Acute left flank pain    2. Hydroureteronephrosis    3. Nephrolithiasis    4. Obstruction of left ureter        Attestations:    Isabella White NP    Please note that this dictation was completed with Tapit, the [employer] voice recognition software. Quite often unanticipated grammatical, syntax, homophones, and other interpretive errors are inadvertently transcribed by the computer software. Please disregard these errors. Please excuse any errors that have escaped final proofreading. Thank you.

## 2020-12-20 LAB
BACTERIA SPEC CULT: NORMAL
SPECIAL REQUESTS,SREQ: NORMAL

## 2021-02-05 ENCOUNTER — HOSPITAL ENCOUNTER (OUTPATIENT)
Dept: PREADMISSION TESTING | Age: 36
Discharge: HOME | End: 2021-02-05
Payer: MEDICAID

## 2021-02-05 PROCEDURE — U0003 INFECTIOUS AGENT DETECTION BY NUCLEIC ACID (DNA OR RNA); SEVERE ACUTE RESPIRATORY SYNDROME CORONAVIRUS 2 (SARS-COV-2) (CORONAVIRUS DISEASE [COVID-19]), AMPLIFIED PROBE TECHNIQUE, MAKING USE OF HIGH THROUGHPUT TECHNOLOGIES AS DESCRIBED BY CMS-2020-01-R: HCPCS

## 2021-02-06 LAB — SARS-COV-2, COV2NT: NOT DETECTED

## 2021-02-08 ENCOUNTER — ANESTHESIA EVENT (OUTPATIENT)
Dept: SURGERY | Age: 36
End: 2021-02-08
Payer: MEDICAID

## 2021-02-09 ENCOUNTER — HOSPITAL ENCOUNTER (OUTPATIENT)
Age: 36
Setting detail: OUTPATIENT SURGERY
Discharge: HOME | End: 2021-02-09
Attending: OBSTETRICS & GYNECOLOGY | Admitting: OBSTETRICS & GYNECOLOGY
Payer: MEDICAID

## 2021-02-09 ENCOUNTER — ANESTHESIA (OUTPATIENT)
Dept: SURGERY | Age: 36
End: 2021-02-09
Payer: MEDICAID

## 2021-02-09 VITALS
TEMPERATURE: 98.5 F | RESPIRATION RATE: 12 BRPM | OXYGEN SATURATION: 97 % | WEIGHT: 224.87 LBS | DIASTOLIC BLOOD PRESSURE: 51 MMHG | BODY MASS INDEX: 37.47 KG/M2 | HEIGHT: 65 IN | HEART RATE: 69 BPM | SYSTOLIC BLOOD PRESSURE: 129 MMHG

## 2021-02-09 DIAGNOSIS — R52 PAIN: Primary | ICD-10-CM

## 2021-02-09 LAB
BASOPHILS # BLD: 0 K/UL (ref 0–0.1)
BASOPHILS NFR BLD: 0 % (ref 0–1)
DIFFERENTIAL METHOD BLD: ABNORMAL
EOSINOPHIL # BLD: 0.4 K/UL (ref 0–0.4)
EOSINOPHIL NFR BLD: 4 % (ref 0–7)
ERYTHROCYTE [DISTWIDTH] IN BLOOD BY AUTOMATED COUNT: 16 % (ref 11.5–14.5)
HCG UR QL: NEGATIVE
HCT VFR BLD AUTO: 35.8 % (ref 35–47)
HGB BLD-MCNC: 11.3 G/DL (ref 11.5–16)
IMM GRANULOCYTES # BLD AUTO: 0 K/UL (ref 0–0.04)
IMM GRANULOCYTES NFR BLD AUTO: 0 % (ref 0–0.5)
LYMPHOCYTES # BLD: 1.5 K/UL (ref 0.8–3.5)
LYMPHOCYTES NFR BLD: 16 % (ref 12–49)
MCH RBC QN AUTO: 25.6 PG (ref 26–34)
MCHC RBC AUTO-ENTMCNC: 31.6 G/DL (ref 30–36.5)
MCV RBC AUTO: 81.2 FL (ref 80–99)
MONOCYTES # BLD: 0.6 K/UL (ref 0–1)
MONOCYTES NFR BLD: 6 % (ref 5–13)
NEUTS SEG # BLD: 7 K/UL (ref 1.8–8)
NEUTS SEG NFR BLD: 74 % (ref 32–75)
NRBC # BLD: 0 K/UL (ref 0–0.01)
NRBC BLD-RTO: 0 PER 100 WBC
PLATELET # BLD AUTO: 362 K/UL (ref 150–400)
PMV BLD AUTO: 10.4 FL (ref 8.9–12.9)
RBC # BLD AUTO: 4.41 M/UL (ref 3.8–5.2)
WBC # BLD AUTO: 9.5 K/UL (ref 3.6–11)

## 2021-02-09 PROCEDURE — 81025 URINE PREGNANCY TEST: CPT

## 2021-02-09 PROCEDURE — 85025 COMPLETE CBC W/AUTO DIFF WBC: CPT

## 2021-02-09 PROCEDURE — 74011250636 HC RX REV CODE- 250/636: Performed by: NURSE ANESTHETIST, CERTIFIED REGISTERED

## 2021-02-09 PROCEDURE — 76060000032 HC ANESTHESIA 0.5 TO 1 HR: Performed by: OBSTETRICS & GYNECOLOGY

## 2021-02-09 PROCEDURE — 77030040361 HC SLV COMPR DVT MDII -B

## 2021-02-09 PROCEDURE — 77030037417 HC DEV TISS RMVL HOLO -H: Performed by: OBSTETRICS & GYNECOLOGY

## 2021-02-09 PROCEDURE — 77030040922 HC BLNKT HYPOTHRM STRY -A

## 2021-02-09 PROCEDURE — 88305 TISSUE EXAM BY PATHOLOGIST: CPT

## 2021-02-09 PROCEDURE — 36415 COLL VENOUS BLD VENIPUNCTURE: CPT

## 2021-02-09 PROCEDURE — 74011250636 HC RX REV CODE- 250/636: Performed by: ANESTHESIOLOGY

## 2021-02-09 PROCEDURE — 77030019905 HC CATH URETH INTMIT MDII -A: Performed by: OBSTETRICS & GYNECOLOGY

## 2021-02-09 PROCEDURE — 76210000006 HC OR PH I REC 0.5 TO 1 HR: Performed by: OBSTETRICS & GYNECOLOGY

## 2021-02-09 PROCEDURE — 76210000020 HC REC RM PH II FIRST 0.5 HR: Performed by: OBSTETRICS & GYNECOLOGY

## 2021-02-09 PROCEDURE — 2709999900 HC NON-CHARGEABLE SUPPLY: Performed by: OBSTETRICS & GYNECOLOGY

## 2021-02-09 PROCEDURE — 76010000138 HC OR TIME 0.5 TO 1 HR: Performed by: OBSTETRICS & GYNECOLOGY

## 2021-02-09 PROCEDURE — 77030041423 HC SYST FLUID MNGMT FLUENT HOLO -D: Performed by: OBSTETRICS & GYNECOLOGY

## 2021-02-09 PROCEDURE — 77030020143 HC AIRWY LARYN INTUB CGAS -A: Performed by: NURSE ANESTHETIST, CERTIFIED REGISTERED

## 2021-02-09 RX ORDER — NALOXONE HYDROCHLORIDE 0.4 MG/ML
0.2 INJECTION, SOLUTION INTRAMUSCULAR; INTRAVENOUS; SUBCUTANEOUS
Status: DISCONTINUED | OUTPATIENT
Start: 2021-02-09 | End: 2021-02-09 | Stop reason: HOSPADM

## 2021-02-09 RX ORDER — FENTANYL CITRATE 50 UG/ML
INJECTION, SOLUTION INTRAMUSCULAR; INTRAVENOUS AS NEEDED
Status: DISCONTINUED | OUTPATIENT
Start: 2021-02-09 | End: 2021-02-09 | Stop reason: HOSPADM

## 2021-02-09 RX ORDER — LIDOCAINE HYDROCHLORIDE 10 MG/ML
0.1 INJECTION, SOLUTION EPIDURAL; INFILTRATION; INTRACAUDAL; PERINEURAL AS NEEDED
Status: DISCONTINUED | OUTPATIENT
Start: 2021-02-09 | End: 2021-02-09 | Stop reason: HOSPADM

## 2021-02-09 RX ORDER — HYDROMORPHONE HYDROCHLORIDE 1 MG/ML
.25-1 INJECTION, SOLUTION INTRAMUSCULAR; INTRAVENOUS; SUBCUTANEOUS
Status: DISCONTINUED | OUTPATIENT
Start: 2021-02-09 | End: 2021-02-09 | Stop reason: HOSPADM

## 2021-02-09 RX ORDER — DIPHENHYDRAMINE HYDROCHLORIDE 50 MG/ML
12.5 INJECTION, SOLUTION INTRAMUSCULAR; INTRAVENOUS AS NEEDED
Status: DISCONTINUED | OUTPATIENT
Start: 2021-02-09 | End: 2021-02-09 | Stop reason: HOSPADM

## 2021-02-09 RX ORDER — SODIUM CHLORIDE, SODIUM LACTATE, POTASSIUM CHLORIDE, CALCIUM CHLORIDE 600; 310; 30; 20 MG/100ML; MG/100ML; MG/100ML; MG/100ML
125 INJECTION, SOLUTION INTRAVENOUS CONTINUOUS
Status: DISCONTINUED | OUTPATIENT
Start: 2021-02-09 | End: 2021-02-09 | Stop reason: HOSPADM

## 2021-02-09 RX ORDER — MIDAZOLAM HYDROCHLORIDE 1 MG/ML
INJECTION, SOLUTION INTRAMUSCULAR; INTRAVENOUS AS NEEDED
Status: DISCONTINUED | OUTPATIENT
Start: 2021-02-09 | End: 2021-02-09 | Stop reason: HOSPADM

## 2021-02-09 RX ORDER — IBUPROFEN 800 MG/1
800 TABLET ORAL
Qty: 21 TAB | Refills: 0 | Status: SHIPPED | OUTPATIENT
Start: 2021-02-09 | End: 2021-10-13

## 2021-02-09 RX ORDER — ONDANSETRON 2 MG/ML
INJECTION INTRAMUSCULAR; INTRAVENOUS AS NEEDED
Status: DISCONTINUED | OUTPATIENT
Start: 2021-02-09 | End: 2021-02-09 | Stop reason: HOSPADM

## 2021-02-09 RX ORDER — HYDROCODONE BITARTRATE AND ACETAMINOPHEN 5; 325 MG/1; MG/1
1 TABLET ORAL
Qty: 12 TAB | Refills: 0 | Status: SHIPPED | OUTPATIENT
Start: 2021-02-09 | End: 2021-02-12

## 2021-02-09 RX ORDER — KETOROLAC TROMETHAMINE 30 MG/ML
INJECTION, SOLUTION INTRAMUSCULAR; INTRAVENOUS AS NEEDED
Status: DISCONTINUED | OUTPATIENT
Start: 2021-02-09 | End: 2021-02-09 | Stop reason: HOSPADM

## 2021-02-09 RX ORDER — FLUMAZENIL 0.1 MG/ML
0.2 INJECTION INTRAVENOUS
Status: DISCONTINUED | OUTPATIENT
Start: 2021-02-09 | End: 2021-02-09 | Stop reason: HOSPADM

## 2021-02-09 RX ORDER — DEXAMETHASONE SODIUM PHOSPHATE 4 MG/ML
INJECTION, SOLUTION INTRA-ARTICULAR; INTRALESIONAL; INTRAMUSCULAR; INTRAVENOUS; SOFT TISSUE AS NEEDED
Status: DISCONTINUED | OUTPATIENT
Start: 2021-02-09 | End: 2021-02-09 | Stop reason: HOSPADM

## 2021-02-09 RX ORDER — PROPOFOL 10 MG/ML
INJECTION, EMULSION INTRAVENOUS AS NEEDED
Status: DISCONTINUED | OUTPATIENT
Start: 2021-02-09 | End: 2021-02-09 | Stop reason: HOSPADM

## 2021-02-09 RX ADMIN — DIPHENHYDRAMINE HYDROCHLORIDE 12.5 MG: 50 INJECTION, SOLUTION INTRAMUSCULAR; INTRAVENOUS at 08:24

## 2021-02-09 RX ADMIN — FENTANYL CITRATE 25 MCG: 0.05 INJECTION, SOLUTION INTRAMUSCULAR; INTRAVENOUS at 07:39

## 2021-02-09 RX ADMIN — SODIUM CHLORIDE, POTASSIUM CHLORIDE, SODIUM LACTATE AND CALCIUM CHLORIDE 125 ML/HR: 600; 310; 30; 20 INJECTION, SOLUTION INTRAVENOUS at 06:09

## 2021-02-09 RX ADMIN — FENTANYL CITRATE 25 MCG: 0.05 INJECTION, SOLUTION INTRAMUSCULAR; INTRAVENOUS at 07:40

## 2021-02-09 RX ADMIN — MIDAZOLAM HYDROCHLORIDE 2 MG: 2 INJECTION, SOLUTION INTRAMUSCULAR; INTRAVENOUS at 07:31

## 2021-02-09 RX ADMIN — HYDROMORPHONE HYDROCHLORIDE 0.5 MG: 1 INJECTION, SOLUTION INTRAMUSCULAR; INTRAVENOUS; SUBCUTANEOUS at 08:46

## 2021-02-09 RX ADMIN — FENTANYL CITRATE 50 MCG: 0.05 INJECTION, SOLUTION INTRAMUSCULAR; INTRAVENOUS at 08:01

## 2021-02-09 RX ADMIN — ONDANSETRON HYDROCHLORIDE 4 MG: 2 SOLUTION INTRAMUSCULAR; INTRAVENOUS at 07:51

## 2021-02-09 RX ADMIN — FENTANYL CITRATE 50 MCG: 0.05 INJECTION, SOLUTION INTRAMUSCULAR; INTRAVENOUS at 07:31

## 2021-02-09 RX ADMIN — FENTANYL CITRATE 25 MCG: 0.05 INJECTION, SOLUTION INTRAMUSCULAR; INTRAVENOUS at 07:43

## 2021-02-09 RX ADMIN — PROPOFOL 200 MG: 10 INJECTION, EMULSION INTRAVENOUS at 07:38

## 2021-02-09 RX ADMIN — KETOROLAC TROMETHAMINE 30 MG: 30 INJECTION INTRAMUSCULAR; INTRAVENOUS at 08:00

## 2021-02-09 RX ADMIN — DEXAMETHASONE SODIUM PHOSPHATE 8 MG: 4 INJECTION, SOLUTION INTRAMUSCULAR; INTRAVENOUS at 07:31

## 2021-02-09 RX ADMIN — FENTANYL CITRATE 25 MCG: 0.05 INJECTION, SOLUTION INTRAMUSCULAR; INTRAVENOUS at 07:42

## 2021-02-09 NOTE — OP NOTES
[facility] [place] [facility]  OPERATIVE REPORT    Name:  Jenn Dao  MR#:  [id number]  :  1985  ACCOUNT #:  [de-identified]  DATE OF SERVICE:  2021    PREOPERATIVE DIAGNOSES:  1. Menorrhagia. 2.  Suspected endometrial polyp. POSTOPERATIVE DIAGNOSES:  1. Menorrhagia. 2.  Suspected endometrial polyp, awaiting pathology. PROCEDURES PERFORMED:  Hysteroscopy with apparent polypectomy using a MyoSure instrument, dilatation and curettage, and IUD insertion. SURGEON:  Ying Leroy MD    ASSISTANT:  None. ANESTHESIA:  General.    COMPLICATIONS:  None. SPECIMENS REMOVED:  Included endometrial polyps and endometrial curettings. IMPLANTS:  Included a Mirena IUD. ESTIMATED BLOOD LOSS:  Less than 50 mL. FINDINGS:  Included a normal endocervical canal.  The endometrial cavity showed polypoid tissues/lesions arising from the posterior corpus around the midline and from the anterior lower uterine segment at approximately 11 o'clock and 2 o'clock. Both tubal ostia were visualized. ACCESSORIES:  None. DISPOSITION:  The patient appeared to tolerate the procedure well and was transferred to the recovery room in stable condition. INDICATIONS FOR SURGERY:  The patient is a 42-year-old woman who presented to the office with a complaint of heavy bleeding. Evaluation most recently with a sonohysterogram showed apparent polypoid lesions and it was felt the procedure above was indicated. The patient already had a tubal ligation and so the IUD is inserted to help control her bleeding in the future. PROCEDURE:  The patient was taken to the operating room, placed in the supine position, and placed under general anesthesia. Next, she was placed in the dorsal lithotomy position and prepped and draped in the usual sterile fashion using a Betadine prep solution. The bladder was entered using a red rubber catheter.   The cervix was then visualized and grasped with a single-tooth tenaculum. The cervix was then progressively dilated using Hegar dilators up to a #7 Hegar dilator. The MyoSure hysteroscope was then placed through the endocervical canal into the endometrial cavity with the findings as mentioned above. Next, using a MyoSure LITE instrument, the polypoid lesions were then resected; first, along the posterior corpus until that tissue was removed and we were down to the level of the endometrium. Then going to the lesions in the lower anterior segments first on the right side at approximately 11 o'clock removing that tissue and then on the left side. When all identified polypoid tissues had been resected and removed, this portion of the procedure was felt to be complete. The hysteroscope was removed and then using a medium sharp curette, curettage was performed symmetrically and thoroughly throughout the endometrial cavity with a moderate amount of tissue obtained. When the gritty texture in the endometrium was noted throughout and no further tissues was being obtained, that portion of the procedure was felt to be complete. Finally, the IUD was brought on to the table. It was loaded in the  and the uterus was then sounded to approximately 9 cm. The flange in the IUD was adjusted to 9 cm as well. The IUD was inserted into the intrauterine cavity to approximately 7.5-8 cm. The lever and the IUD  were withdrawn to the farshad effectively releasing the arms. After approximately 15 seconds, the IUD was advanced to the fundus. The lever was then fully removed releasing the strings. The IUD  was then removed and strings were cut to approximately 4 cm. The single-tooth tenaculum was removed and good hemostasis was noted. The patient was then cleaned up, placed back in the supine position, weaned from anesthesia, and transferred to the recovery room in stable condition. All counts were correct. The patient did appear to tolerate the procedure well.   It should be noted that the patient did have SCDs on throughout the case.       Adelaida Loza MD      [id number]  D:  02/09/2021 8:18  T:  02/09/2021 18:22  JOB #:  [id number]  CC:  Danny Crowell MD

## 2021-02-09 NOTE — BRIEF OP NOTE
Brief Postoperative Note    Patient: Sera Lagos  YOB: 1985  MRN: [id number]    Date of Procedure: 2/9/2021     Pre-Op Diagnosis: MENORRHAGIA, ENDOMETRIAL POLYP    Post-Op Diagnosis: Same as preoperative diagnosis. awaiting pathology    Procedure(s): HYSTEROSCOPY DILITATION AND CURETTAGE, POLYPECTOMY WITH Virgel Quiet, IUD insertion    Surgeon(s):  Elfreda Runner, MD    Surgical Assistant: None    Anesthesia: General     Estimated Blood Loss (mL): less than 50     Complications: None    Specimens:   ID Type Source Tests Collected by Time Destination   1 : Endometrial Polyps Preservative Uterus  Elfreda Runner, MD 2/9/2021 0640 Pathology   2 : Endometrial Curettings Preservative Uterus  Elfreda Runner, MD 2/9/2021 0800 Pathology        Implants:   Implant Name Type Inv. Item Serial No.  Lot No. LRB No. Used Action   Mirena 52mg IUD   [id number]  [id number] N/A 1 Implanted       Drains: * No LDAs found *    Findings: normal endocervical canal, endometrial cavity showed polypoid tissue/lesions arising from the posterior corpus and the anterior lower segment at ~11 and 2 o'clock. Ostia seen bilaterally.      Electronically Signed by Avery Roque MD on 2/9/2021 at 8:08 AM

## 2021-02-09 NOTE — DISCHARGE INSTRUCTIONS
Instructions Following Ambulatory Surgery    Activity  · As tolerated and directed by your doctor  · Bathe or shower as directed by your doctor    Diet  · Clear liquids until no nausea or vomiting; then light diet for the first day  · Advance to regular diet on second day, unless your doctor orders otherwise  · If nausea and vomiting continues, call your doctor    Pain  · Take pain medication as directed by your doctor  ·  Call your doctor if pain is NOT relieved by medication  · DO NOT take aspirin or blood thinners until directed by your doctor    Dressing Care: n/a    Follow-Up Phone Calls  · Will be made nursing staff  · If you have any problems, call your doctor as needed    Call your doctor if  · Excessive bleeding that does not stop after holding mild pressure over the area  · Temperature of 101 degrees F or above  · Redness,excessive swelling or bruising, and/or green or yellow, smelly discharge from incision    After Anesthesia  · For the first 24 hours: DO NOT Drive, Drink alcoholic beverages, or Make important decisions  · Be aware of dizziness following anesthesia and while taking pain medication    Medication changes have been made by your doctor; see Universal Medication form  Continue home medications as previously prescribed with the addition of: Other Instructions: pelvic rest x 7-10 days    Appointment date/time: as scheduled    Your doctor's phone number: [phone]      DISCHARGE SUMMARY from your Nurse      PATIENT INSTRUCTIONS    After general anesthesia or intravenous sedation, for 24 hours or while taking prescription Narcotics:  · Limit your activities  · Do not drive and operate hazardous machinery  · Do not make important personal or business decisions  · Do  not drink alcoholic beverages  · If you have not urinated within 8 hours after discharge, please contact your surgeon on call.     Report the following to your surgeon:  · Excessive pain, swelling, redness or odor of or around the surgical area  · Temperature over 100.5  · Nausea and vomiting lasting longer than 4 hours or if unable to take medications  · Any signs of decreased circulation or nerve impairment to extremity: change in color, persistent  numbness, tingling, coldness or increase pain  · Any questions      GOOD HELP TO FIGHT AN INFECTION  Here are a few tip to help reduce the chance of getting an infection after surgery:   Wash Your Hands   Good handwashing is the most important thing you and your caregiver can do.  Wash before and after caring for any wounds. Dry your hand with a clean towel.  Wash with soap and water for at least 20 seconds. A TIP: sing the \"Happy Birthday\" song through one time while washing to help with the timing.  Use a hand  in between washings.  Shower   When your surgeon says it is OK to take a shower, use a new bar of antibacterial soap (if that is what you use, and keep that bar of soap ONLY for your use), or antibacterial body wash.  Use a clean wash cloth or sponge when you bathe.  Dry off with a clean towel  after every bath - be careful around any wounds, skin staples, sutures or surgical glue over/on wounds.  Do not enter swimming pools, hot tubs, lakes, rivers and/or ocean until wounds are healed and your doctor/surgeon says it is OK.  Use Clean Sheets   Sleep on freshly laundered sheets after your surgery.  Keep the surgery site covered with a clean, dry bandage (if instructed to do so). If the bandage becomes soiled, reapply a new, dry, clean bandage.  Do not allow pets to sleep with you while your wound is healing.  Lifestyle Modification and Controlling Your Blood Sugar   Smoking slows wound healing. Stop smoking and limit exposure to second-hand smoke.  High blood sugar slows wound healing.   Eat a well-balanced diet to provide proper nutrition while healing   Monitor your blood sugar (if you are a diabetic) and take your medications as you are suppose to so you can control you blood sugar after surgery. COUGH AND DEEP BREATHE    Breathing deeply and coughing are very important exercises to do after surgery. Deep breathing and coughing open the little air tubes and air sacks in your lungs. You take deep breaths every day. You may not even notice - it is just something you do when you sigh or yawn. It is a natural exercise you do to keep these air passages open. After surgery, take deep breaths and cough, on purpose. DIRECTIONS:  · Take 10 to 15 slow deep breaths every hour while awake. · Breathe in deeply, and hold it for 2 seconds. · Exhale slowly through puckered lips, like blowing up a balloon. · After every 4th or 5th deep breath, hug your pillow to your chest or belly and give a hard, deep cough. Yes, it will probably hurt. But doing this exercise is a very important part of healing after surgery. Take your pain medicine to help you do this exercise without too much pain. Coughing and deep breathing help prevent bronchitis and pneumonia after surgery. If you had chest or belly surgery, use a pillow as a \"hug michelle\" and hold it tightly to your chest or belly when you cough. ANKLE PUMPS    Ankle pumps increase the circulation of oxygenated blood to your lower extremities and decrease your risk for circulation problems such as blood clots. They also stretch the muscles, tendons and ligaments in your foot and ankle, and prevent joint contracture in the ankle and foot, especially after surgeries on the legs. It is important to do ankle pump exercises regularly after surgery because immobility increases your risk for developing a blood clot. Your doctor may also have you take an Aspirin for the next few days as well. If your doctor did not ask you to take an Aspirin, consult with him before starting Aspirin therapy on your own. The exercise is quite simple.      · Slowly point your foot forward, feeling the muscles on the top of your lower leg stretch, and hold this position for 5 seconds. · Next, pull your foot back toward you as far as possible, stretching the calf muscles, and hold that position for 5 seconds. · Repeat with the other foot. · Perform 10 repetitions every hour while awake for both ankles if possible (down and then up with the foot once is one repetition). You should feel gentle stretching of the muscles in your lower leg when doing this exercise. If you feel pain, or your range of motion is limited, don't push too hard. Only go the limit your joint and muscles will let you go. If you have increasing pain, progressively worsening leg warmth or swelling, STOP the exercise and call your doctor. MEDICATION AND   SIDE EFFECT GUIDE    The [address] [place] [address] MEDICATION AND SIDE EFFECT GUIDE was provided to the PATIENT AND CARE PROVIDER. Information provided includes instruction about drug purpose and common side effects for the following medications:   · Hydrocodone-acetaminophen (Norco)  · Ibuprofen (Motrin)        These are general instructions for a healthy lifestyle:    *   Please give a list of your current medications to your Primary Care Provider. *   Please update this list whenever your medications are discontinued, doses are changed, or new medications (including over-the-counter products) are added. *   Please carry medication information at all times in case of emergency situations. About Smoking  No smoking / No tobacco products  Avoid exposure to second hand smoke     Surgeon General's Warning:  Quitting smoking now greatly reduces serious risk to your health. Obesity, smoking, and sedentary lifestyle greatly increases your risk for illness and disease. A healthy diet, regular physical exercise & weight monitoring are important for maintaining a healthy lifestyle.       Congestive Heart Failure  You may be retaining fluid if you have a history of heart failure or if you experience any of the following symptoms:  Weight gain of 3 pounds or more overnight or 5 pounds in a week, increased swelling in your hands or feet or shortness of breath while lying flat in bed. Please call your doctor as soon as you notice any of these symptoms; do not wait until your next office visit. Learning About Coronavirus [phone])  Coronavirus [phone]): Overview  What is coronavirus (COVID-19)? The coronavirus disease (COVID-19) is caused by a virus. It is an illness that was first found in [place], [place], in December 2019. It has since spread worldwide. The virus can cause fever, cough, and trouble breathing. In severe cases, it can cause pneumonia and make it hard to breathe without help. It can cause death. Coronaviruses are a large group of viruses. They cause the common cold. They also cause more serious illnesses like Middle East respiratory syndrome (MERS) and severe acute respiratory syndrome (SARS). COVID-19 is caused by a novel coronavirus. That means it's a new type that has not been seen in people before. This virus spreads person-to-person through droplets from coughing and sneezing. It can also spread when you are close to someone who is infected. And it can spread when you touch something that has the virus on it, such as a doorknob or a tabletop. What can you do to protect yourself from coronavirus (COVID-19)? The best way to protect yourself from getting sick is to:  · Avoid areas where there is an outbreak. · Avoid contact with people who may be infected. · Wash your hands often with soap or alcohol-based hand sanitizers. · Avoid crowds and try to stay at least 6 feet away from other people. · Wash your hands often, especially after you cough or sneeze. Use soap and water, and scrub for at least 20 seconds. If soap and water aren't available, use an alcohol-based hand . · Avoid touching your mouth, nose, and eyes.   What can you do to avoid spreading the virus to others? To help avoid spreading the virus to others:  · Cover your mouth with a tissue when you cough or sneeze. Then throw the tissue in the trash. · Use a disinfectant to clean things that you touch often. · Stay home if you are sick or have been exposed to the virus. Don't go to school, work, or public areas. And don't use public transportation. · If you are sick:  ? Leave your home only if you need to get medical care. But call the doctor's office first so they know you're coming. And wear a face mask, if you have one.  ? If you have a face mask, wear it whenever you're around other people. It can help stop the spread of the virus when you cough or sneeze. ? Clean and disinfect your home every day. Use household  and disinfectant wipes or sprays. Take special care to clean things that you grab with your hands. These include doorknobs, remote controls, phones, and handles on your refrigerator and microwave. And don't forget countertops, tabletops, bathrooms, and computer keyboards. When to call for help  Call 911 anytime you think you may need emergency care. For example, call if:  · You have severe trouble breathing. (You can't talk at all.)  · You have constant chest pain or pressure. · You are severely dizzy or lightheaded. · You are confused or can't think clearly. · Your face and lips have a blue color. · You pass out (lose consciousness) or are very hard to wake up. Call your doctor now if you develop symptoms such as:  · Shortness of breath. · Fever. · Cough. If you need to get care, call ahead to the doctor's office for instructions before you go. Make sure you wear a face mask, if you have one, to prevent exposing other people to the virus. Where can you get the latest information? The following health organizations are tracking and studying this virus. Their websites contain the most up-to-date information.  Annabelle Re also learn what to do if you think you may have been exposed to the virus. · U.S. Centers for Disease Control and Prevention (CDC): The CDC provides updated news about the disease and travel advice. The website also tells you how to prevent the spread of infection. www.cdc.gov  · World Health Organization [facility]): WHO offers information about the virus outbreaks. WHO also has travel advice. www.who.int  Current as of: April 1, 2020               Content Version: 12.4  © 2006-2020 [employer]. Care instructions adapted under license by your healthcare professional. If you have questions about a medical condition or this instruction, always ask your healthcare professional. [address] any warranty or liability for your use of this information. The discharge information has been reviewed with the {PATIENT PARENT GUARDIAN:02140}. Any questions and concerns from the {PATIENT PARENT GUARDIAN:57853} have been addressed. The {PATIENT PARENT GUARDIAN:46351} verbalized understanding. The following personal items collected during your admission are returned to you:   Dental Appliance: Dental Appliances: None  Vision: Visual Aid: Glasses  Hearing Aid:    Jewelry: Jewelry: None  Clothing: Clothing: Other (comment)(street clothings placed in belongings bag)  Other Valuables:  Other Valuables: Eyeglasses(removed and placed in seperate belongingbag)  Valuables sent to safe:

## 2021-02-09 NOTE — ANESTHESIA POSTPROCEDURE EVALUATION
Procedure(s): HYSTEROSCOPY DILITATION AND CURETTAGE, POLYPECTOMY WITH MYOSURE, MIRENA IUD INSERTION. general    Anesthesia Post Evaluation        Patient location during evaluation: bedside  Level of consciousness: awake  Pain management: satisfactory to patient  Airway patency: patent  Anesthetic complications: no  Cardiovascular status: acceptable  Respiratory status: acceptable  Hydration status: acceptable        INITIAL Post-op Vital signs:   Vitals Value Taken Time   /51 02/09/21 0900   Temp 36.9 °C (98.5 °F) 02/09/21 0855   Pulse 70 02/09/21 0901   Resp 12 02/09/21 0901   SpO2 97 % 02/09/21 0901   Vitals shown include unvalidated device data.

## 2021-02-09 NOTE — ANESTHESIA PREPROCEDURE EVALUATION
Relevant Problems   No relevant active problems       Anesthetic History   No history of anesthetic complications            Review of Systems / Medical History  Patient summary reviewed, nursing notes reviewed and pertinent labs reviewed    Pulmonary  Within defined limits                 Neuro/Psych   Within defined limits           Cardiovascular  Within defined limits                     GI/Hepatic/Renal         Renal disease: stones       Endo/Other  Within defined limits           Other Findings              Physical Exam    Airway  Mallampati: II  TM Distance: 4 - 6 cm  Neck ROM: normal range of motion   Mouth opening: Normal     Cardiovascular    Rhythm: regular  Rate: normal         Dental  No notable dental hx       Pulmonary  Breath sounds clear to auscultation               Abdominal         Other Findings            Anesthetic Plan    ASA: 1  Anesthesia type: general            Anesthetic plan and risks discussed with: Patient

## 2021-08-23 ENCOUNTER — APPOINTMENT (OUTPATIENT)
Dept: GENERAL RADIOLOGY | Age: 36
End: 2021-08-23
Attending: EMERGENCY MEDICINE
Payer: MEDICAID

## 2021-08-23 ENCOUNTER — HOSPITAL ENCOUNTER (EMERGENCY)
Age: 36
Discharge: HOME | End: 2021-08-23
Attending: EMERGENCY MEDICINE
Payer: MEDICAID

## 2021-08-23 VITALS
HEIGHT: 66 IN | RESPIRATION RATE: 18 BRPM | WEIGHT: 220 LBS | SYSTOLIC BLOOD PRESSURE: 142 MMHG | HEART RATE: 82 BPM | BODY MASS INDEX: 35.36 KG/M2 | DIASTOLIC BLOOD PRESSURE: 84 MMHG | OXYGEN SATURATION: 99 %

## 2021-08-23 DIAGNOSIS — M25.469 SUPRAPATELLAR EFFUSION OF KNEE: ICD-10-CM

## 2021-08-23 DIAGNOSIS — S83.502A SPRAIN OF CRUCIATE LIGAMENT OF LEFT KNEE, INITIAL ENCOUNTER: Primary | ICD-10-CM

## 2021-08-23 PROCEDURE — 99283 EMERGENCY DEPT VISIT LOW MDM: CPT

## 2021-08-23 PROCEDURE — 73562 X-RAY EXAM OF KNEE 3: CPT

## 2021-08-23 RX ORDER — IBUPROFEN 400 MG/1
400 TABLET ORAL
Qty: 20 TABLET | Refills: 0 | Status: SHIPPED | OUTPATIENT
Start: 2021-08-23 | End: 2022-05-02 | Stop reason: ALTCHOICE

## 2021-08-23 NOTE — ED PROVIDER NOTES
EMERGENCY DEPARTMENT HISTORY AND PHYSICAL EXAM      Date: 2021  Patient Name: Danielle Lang    History of Presenting Illness     Chief Complaint   Patient presents with    Knee Injury       History Provided By: Patient    HPI: Danielle Lang, 39 y.o. female with a past medical history significant No significant past medical history presents to the ED with cc of Twisted left knee when she stepped wrong carrying furniture yesterday. Hurts to walk on it. Relieved by rest.    There are no other complaints, changes, or physical findings at this time. PCP: Dexter, MD [place]ival    No current facility-administered medications on file prior to encounter. Current Outpatient Medications on File Prior to Encounter   Medication Sig Dispense Refill    cholecalciferol, vitamin D3, (VITAMIN D3 PO) Take  by mouth. (Patient not taking: Reported on 2021)      ibuprofen (MOTRIN) 800 mg tablet Take 1 Tab by mouth every eight (8) hours as needed for Pain. 21 Tab 0       Past History     Past Medical History:  Past Medical History:   Diagnosis Date    Kidney calculi        Past Surgical History:  Past Surgical History:   Procedure Laterality Date    HX  SECTION      HX LITHOTRIPSY  2017    \"multiple times but  was most recent\"    HX LITHOTRIPSY      HX TUBAL LIGATION         Family History:  History reviewed. No pertinent family history. Social History:  Social History     Tobacco Use    Smoking status: Current Every Day Smoker     Packs/day: 0.50     Years: 10.00     Pack years: 5.00    Smokeless tobacco: Current User   Substance Use Topics    Alcohol use: No     Comment: rarely    Drug use: No       Allergies: Allergies   Allergen Reactions    Ciprofloxacin Other (comments)     Thrush         Review of Systems     Review of Systems   Constitutional: Negative. HENT: Negative. Respiratory: Negative. Cardiovascular: Negative. Genitourinary: Negative.     Musculoskeletal: Positive for joint swelling. Left knee pain   Neurological: Negative. All other systems reviewed and are negative. Physical Exam     Physical Exam  Vitals and nursing note reviewed. Constitutional:       General: She is not in acute distress. Appearance: Normal appearance. She is obese. She is not ill-appearing. HENT:      Head: Normocephalic. Eyes:      Extraocular Movements: Extraocular movements intact. Pupils: Pupils are equal, round, and reactive to light. Cardiovascular:      Rate and Rhythm: Normal rate and regular rhythm. Pulses: Normal pulses. Heart sounds: Normal heart sounds. Pulmonary:      Effort: Pulmonary effort is normal.      Breath sounds: Normal breath sounds. Musculoskeletal:         General: Swelling and tenderness present. Cervical back: Normal range of motion and neck supple. Comments: Left knee tender swelling. NV intact   Neurological:      General: No focal deficit present. Mental Status: She is alert and oriented to person, place, and time. Lab and Diagnostic Study Results     Labs -   No results found for this or any previous visit (from the past 12 hour(s)). Radiologic Studies -   @lastxrresult@  CT Results  (Last 48 hours)    None        CXR Results  (Last 48 hours)    None            Medical Decision Making   - I am the first provider for this patient. - I reviewed the vital signs, available nursing notes, past medical history, past surgical history, family history and social history. - Initial assessment performed. The patients presenting problems have been discussed, and they are in agreement with the care plan formulated and outlined with them. I have encouraged them to ask questions as they arise throughout their visit. Vital Signs-Reviewed the patient's vital signs.   Patient Vitals for the past 12 hrs:   Pulse Resp BP SpO2   08/23/21 1619 82 18 (!) 142/84 99 %       Records Reviewed: Nursing Notes    The patient presents with       ED Course:          Provider Notes (Medical Decision Making): MDM       Procedures   Medical Decision Makingedical Decision Making  Performed by: Wendelin Goodell, MD  PROCEDURES:  Procedures       Disposition   Disposition: DC- Adult Discharges: All of the diagnostic tests were reviewed and questions answered. Diagnosis, care plan and treatment options were discussed. The patient understands the instructions and will follow up as directed. The patients results have been reviewed with them. They have been counseled regarding their diagnosis. The patient verbally convey understanding and agreement of the signs, symptoms, diagnosis, treatment and prognosis and additionally agrees to follow up as recommended with their PCP in 24 - 48 hours. They also agree with the care-plan and convey that all of their questions have been answered. I have also put together some discharge instructions for them that include: 1) educational information regarding their diagnosis, 2) how to care for their diagnosis at home, as well a 3) list of reasons why they would want to return to the ED prior to their follow-up appointment, should their condition change. Discharged    DISCHARGE PLAN:  1. Current Discharge Medication List      CONTINUE these medications which have NOT CHANGED    Details   cholecalciferol, vitamin D3, (VITAMIN D3 PO) Take  by mouth. ibuprofen (MOTRIN) 800 mg tablet Take 1 Tab by mouth every eight (8) hours as needed for Pain. Qty: 21 Tab, Refills: 0           2. Follow-up Information     Follow up With Specialties Details Why Angeles Santos MD Family Medicine In 3 days  [address]  [phone]          3. Return to ED if worse   4.    Current Discharge Medication List      START taking these medications    Details   !! ibuprofen (MOTRIN) 400 mg tablet Take 1 Tablet by mouth every six (6) hours as needed for Pain.  Qty: 20 Tablet, Refills: 0  Start date: 8/23/2021       !! - Potential duplicate medications found. Please discuss with provider. CONTINUE these medications which have NOT CHANGED    Details   !! ibuprofen (MOTRIN) 800 mg tablet Take 1 Tab by mouth every eight (8) hours as needed for Pain. Qty: 21 Tab, Refills: 0       !! - Potential duplicate medications found. Please discuss with provider. Diagnosis     Clinical Impression:   1. Sprain of cruciate ligament of left knee, initial encounter    2. Suprapatellar effusion of knee        Attestations:    Author Cowden, MD    Please note that this dictation was completed with [employer], the computer voice recognition software. Quite often unanticipated grammatical, syntax, homophones, and other interpretive errors are inadvertently transcribed by the computer software. Please disregard these errors. Please excuse any errors that have escaped final proofreading. Thank you.

## 2021-08-23 NOTE — LETTER
[address] [facility] EMERGENCY DEPARTMENT  [address] [address]  [phone]    Work/School Note    Date: 8/23/2021    To Whom It May concern:    Boom Aggarwal was seen and treated today in the emergency room by the following provider(s):  Attending Provider: Morris Serna MD.      Boom Aggarwal can return to work on 8-    Sincerely,          Patricia Neff RN Consent (Ear)/Introductory Paragraph: The rationale for Mohs was explained to the patient and consent was obtained. The risks, benefits and alternatives to therapy were discussed in detail. Specifically, the risks of ear deformity, infection, scarring, bleeding, prolonged wound healing, incomplete removal, allergy to anesthesia, nerve injury and recurrence were addressed. Prior to the procedure, the treatment site was clearly identified and confirmed by the patient. All components of Universal Protocol/PAUSE Rule completed.

## 2021-10-10 ENCOUNTER — APPOINTMENT (OUTPATIENT)
Dept: CT IMAGING | Age: 36
DRG: 465 | End: 2021-10-10
Attending: STUDENT IN AN ORGANIZED HEALTH CARE EDUCATION/TRAINING PROGRAM
Payer: MEDICAID

## 2021-10-10 ENCOUNTER — HOSPITAL ENCOUNTER (INPATIENT)
Age: 36
LOS: 3 days | Discharge: HOME | DRG: 465 | End: 2021-10-13
Attending: STUDENT IN AN ORGANIZED HEALTH CARE EDUCATION/TRAINING PROGRAM | Admitting: FAMILY MEDICINE
Payer: MEDICAID

## 2021-10-10 DIAGNOSIS — N23 RENAL COLIC: Primary | ICD-10-CM

## 2021-10-10 DIAGNOSIS — N20.1 URETEROLITHIASIS: ICD-10-CM

## 2021-10-10 DIAGNOSIS — R31.9 URINARY TRACT INFECTION WITH HEMATURIA, SITE UNSPECIFIED: ICD-10-CM

## 2021-10-10 DIAGNOSIS — N39.0 URINARY TRACT INFECTION WITH HEMATURIA, SITE UNSPECIFIED: ICD-10-CM

## 2021-10-10 DIAGNOSIS — D72.829 LEUKOCYTOSIS, UNSPECIFIED TYPE: ICD-10-CM

## 2021-10-10 PROBLEM — N20.0 RENAL STONE: Status: ACTIVE | Noted: 2021-10-10

## 2021-10-10 LAB
ALBUMIN SERPL-MCNC: 3.9 G/DL (ref 3.5–5)
ALBUMIN/GLOB SERPL: 1 {RATIO} (ref 1.1–2.2)
ALP SERPL-CCNC: 137 U/L (ref 45–117)
ALT SERPL-CCNC: 16 U/L (ref 12–78)
ANION GAP SERPL CALC-SCNC: 6 MMOL/L (ref 5–15)
APPEARANCE UR: ABNORMAL
AST SERPL W P-5'-P-CCNC: 19 U/L (ref 15–37)
BACTERIA URNS QL MICRO: NEGATIVE /HPF
BACTERIA URNS QL MICRO: NEGATIVE /HPF
BASOPHILS # BLD: 0.1 K/UL (ref 0–0.1)
BASOPHILS NFR BLD: 1 % (ref 0–1)
BILIRUB SERPL-MCNC: 0.3 MG/DL (ref 0.2–1)
BILIRUB UR QL: NEGATIVE
BUN SERPL-MCNC: 14 MG/DL (ref 6–20)
BUN/CREAT SERPL: 16 (ref 12–20)
CA-I BLD-MCNC: 9.8 MG/DL (ref 8.5–10.1)
CHLORIDE SERPL-SCNC: 110 MMOL/L (ref 97–108)
CO2 SERPL-SCNC: 24 MMOL/L (ref 21–32)
COLOR UR: ABNORMAL
COVID-19 RAPID TEST, COVR: NOT DETECTED
CREAT SERPL-MCNC: 0.88 MG/DL (ref 0.55–1.02)
DIFFERENTIAL METHOD BLD: ABNORMAL
EOSINOPHIL # BLD: 0.4 K/UL (ref 0–0.4)
EOSINOPHIL NFR BLD: 3 % (ref 0–7)
ERYTHROCYTE [DISTWIDTH] IN BLOOD BY AUTOMATED COUNT: 14 % (ref 11.5–14.5)
GLOBULIN SER CALC-MCNC: 3.8 G/DL (ref 2–4)
GLUCOSE SERPL-MCNC: 102 MG/DL (ref 65–100)
GLUCOSE UR STRIP.AUTO-MCNC: NEGATIVE MG/DL
HCG UR QL: NEGATIVE
HCT VFR BLD AUTO: 43.1 % (ref 35–47)
HGB BLD-MCNC: 14 G/DL (ref 11.5–16)
HGB UR QL STRIP: ABNORMAL
IMM GRANULOCYTES # BLD AUTO: 0 K/UL (ref 0–0.04)
IMM GRANULOCYTES NFR BLD AUTO: 0 % (ref 0–0.5)
INR PPP: 1.1 (ref 0.9–1.1)
KETONES UR QL STRIP.AUTO: 5 MG/DL
LEUKOCYTE ESTERASE UR QL STRIP.AUTO: ABNORMAL
LYMPHOCYTES # BLD: 2 K/UL (ref 0.8–3.5)
LYMPHOCYTES NFR BLD: 16 % (ref 12–49)
MCH RBC QN AUTO: 28.1 PG (ref 26–34)
MCHC RBC AUTO-ENTMCNC: 32.5 G/DL (ref 30–36.5)
MCV RBC AUTO: 86.4 FL (ref 80–99)
MONOCYTES # BLD: 0.7 K/UL (ref 0–1)
MONOCYTES NFR BLD: 5 % (ref 5–13)
MUCOUS THREADS URNS QL MICRO: ABNORMAL /LPF
NEUTS SEG # BLD: 9.4 K/UL (ref 1.8–8)
NEUTS SEG NFR BLD: 75 % (ref 32–75)
NITRITE UR QL STRIP.AUTO: NEGATIVE
NRBC # BLD: 0 K/UL (ref 0–0.01)
NRBC BLD-RTO: 0 PER 100 WBC
PH UR STRIP: 5 [PH] (ref 5–8)
PLATELET # BLD AUTO: 410 K/UL (ref 150–400)
PMV BLD AUTO: 10.8 FL (ref 8.9–12.9)
POTASSIUM SERPL-SCNC: 4.3 MMOL/L (ref 3.5–5.1)
PROT SERPL-MCNC: 7.7 G/DL (ref 6.4–8.2)
PROT UR STRIP-MCNC: 100 MG/DL
PROTHROMBIN TIME: 14.1 SEC (ref 11.9–14.7)
RBC # BLD AUTO: 4.99 M/UL (ref 3.8–5.2)
RBC #/AREA URNS HPF: ABNORMAL /HPF (ref 0–5)
RBC #/AREA URNS HPF: NORMAL /HPF (ref 0–5)
SODIUM SERPL-SCNC: 140 MMOL/L (ref 136–145)
SP GR UR REFRACTOMETRY: 1.02 (ref 1–1.03)
SPECIMEN SOURCE: NORMAL
UROBILINOGEN UR QL STRIP.AUTO: 0.1 EU/DL (ref 0.1–1)
WBC # BLD AUTO: 12.7 K/UL (ref 3.6–11)
WBC URNS QL MICRO: ABNORMAL /HPF (ref 0–4)
WBC URNS QL MICRO: NORMAL /HPF (ref 0–4)

## 2021-10-10 PROCEDURE — 74176 CT ABD & PELVIS W/O CONTRAST: CPT

## 2021-10-10 PROCEDURE — 81025 URINE PREGNANCY TEST: CPT

## 2021-10-10 PROCEDURE — 74011000250 HC RX REV CODE- 250: Performed by: EMERGENCY MEDICINE

## 2021-10-10 PROCEDURE — 96375 TX/PRO/DX INJ NEW DRUG ADDON: CPT

## 2021-10-10 PROCEDURE — 87635 SARS-COV-2 COVID-19 AMP PRB: CPT

## 2021-10-10 PROCEDURE — 85025 COMPLETE CBC W/AUTO DIFF WBC: CPT

## 2021-10-10 PROCEDURE — 74011250637 HC RX REV CODE- 250/637: Performed by: FAMILY MEDICINE

## 2021-10-10 PROCEDURE — 80053 COMPREHEN METABOLIC PANEL: CPT

## 2021-10-10 PROCEDURE — 99222 1ST HOSP IP/OBS MODERATE 55: CPT | Performed by: UROLOGY

## 2021-10-10 PROCEDURE — 99284 EMERGENCY DEPT VISIT MOD MDM: CPT

## 2021-10-10 PROCEDURE — 96374 THER/PROPH/DIAG INJ IV PUSH: CPT

## 2021-10-10 PROCEDURE — 85610 PROTHROMBIN TIME: CPT

## 2021-10-10 PROCEDURE — 81001 URINALYSIS AUTO W/SCOPE: CPT

## 2021-10-10 PROCEDURE — 65270000029 HC RM PRIVATE

## 2021-10-10 PROCEDURE — 36415 COLL VENOUS BLD VENIPUNCTURE: CPT

## 2021-10-10 PROCEDURE — 74011250636 HC RX REV CODE- 250/636: Performed by: EMERGENCY MEDICINE

## 2021-10-10 PROCEDURE — 74011250636 HC RX REV CODE- 250/636: Performed by: STUDENT IN AN ORGANIZED HEALTH CARE EDUCATION/TRAINING PROGRAM

## 2021-10-10 PROCEDURE — 87086 URINE CULTURE/COLONY COUNT: CPT

## 2021-10-10 PROCEDURE — 74011250636 HC RX REV CODE- 250/636: Performed by: FAMILY MEDICINE

## 2021-10-10 RX ORDER — ENOXAPARIN SODIUM 100 MG/ML
40 INJECTION SUBCUTANEOUS DAILY
Status: DISCONTINUED | OUTPATIENT
Start: 2021-10-11 | End: 2021-10-13 | Stop reason: HOSPADM

## 2021-10-10 RX ORDER — KETOROLAC TROMETHAMINE 15 MG/ML
15 INJECTION, SOLUTION INTRAMUSCULAR; INTRAVENOUS
Status: COMPLETED | OUTPATIENT
Start: 2021-10-10 | End: 2021-10-10

## 2021-10-10 RX ORDER — ONDANSETRON 2 MG/ML
4 INJECTION INTRAMUSCULAR; INTRAVENOUS
Status: DISCONTINUED | OUTPATIENT
Start: 2021-10-10 | End: 2021-10-13 | Stop reason: HOSPADM

## 2021-10-10 RX ORDER — POLYETHYLENE GLYCOL 3350 17 G/17G
17 POWDER, FOR SOLUTION ORAL DAILY PRN
Status: DISCONTINUED | OUTPATIENT
Start: 2021-10-10 | End: 2021-10-13 | Stop reason: HOSPADM

## 2021-10-10 RX ORDER — ONDANSETRON 4 MG/1
4 TABLET, ORALLY DISINTEGRATING ORAL
Status: DISCONTINUED | OUTPATIENT
Start: 2021-10-10 | End: 2021-10-13 | Stop reason: HOSPADM

## 2021-10-10 RX ORDER — MORPHINE SULFATE 2 MG/ML
2 INJECTION, SOLUTION INTRAMUSCULAR; INTRAVENOUS ONCE
Status: COMPLETED | OUTPATIENT
Start: 2021-10-10 | End: 2021-10-10

## 2021-10-10 RX ORDER — ONDANSETRON 2 MG/ML
4 INJECTION INTRAMUSCULAR; INTRAVENOUS ONCE
Status: COMPLETED | OUTPATIENT
Start: 2021-10-10 | End: 2021-10-10

## 2021-10-10 RX ORDER — ACETAMINOPHEN 650 MG/1
650 SUPPOSITORY RECTAL
Status: DISCONTINUED | OUTPATIENT
Start: 2021-10-10 | End: 2021-10-13 | Stop reason: HOSPADM

## 2021-10-10 RX ORDER — TRAMADOL HYDROCHLORIDE 50 MG/1
50 TABLET ORAL
Status: DISCONTINUED | OUTPATIENT
Start: 2021-10-10 | End: 2021-10-13 | Stop reason: HOSPADM

## 2021-10-10 RX ORDER — SODIUM CHLORIDE 9 MG/ML
75 INJECTION, SOLUTION INTRAVENOUS CONTINUOUS
Status: DISCONTINUED | OUTPATIENT
Start: 2021-10-10 | End: 2021-10-13 | Stop reason: HOSPADM

## 2021-10-10 RX ORDER — ACETAMINOPHEN 325 MG/1
650 TABLET ORAL
Status: DISCONTINUED | OUTPATIENT
Start: 2021-10-10 | End: 2021-10-13 | Stop reason: HOSPADM

## 2021-10-10 RX ADMIN — KETOROLAC TROMETHAMINE 15 MG: 15 INJECTION, SOLUTION INTRAMUSCULAR; INTRAVENOUS at 06:34

## 2021-10-10 RX ADMIN — TRAMADOL HYDROCHLORIDE 50 MG: 50 TABLET, FILM COATED ORAL at 12:55

## 2021-10-10 RX ADMIN — MORPHINE SULFATE 2 MG: 2 INJECTION, SOLUTION INTRAMUSCULAR; INTRAVENOUS at 09:29

## 2021-10-10 RX ADMIN — SODIUM CHLORIDE 75 ML/HR: 9 INJECTION, SOLUTION INTRAVENOUS at 12:55

## 2021-10-10 RX ADMIN — TRAMADOL HYDROCHLORIDE 50 MG: 50 TABLET, FILM COATED ORAL at 19:32

## 2021-10-10 RX ADMIN — CEFTRIAXONE SODIUM 2 G: 2 INJECTION, POWDER, FOR SOLUTION INTRAMUSCULAR; INTRAVENOUS at 09:12

## 2021-10-10 RX ADMIN — SODIUM CHLORIDE 1000 ML: 9 INJECTION, SOLUTION INTRAVENOUS at 06:33

## 2021-10-10 RX ADMIN — ONDANSETRON 4 MG: 2 INJECTION INTRAMUSCULAR; INTRAVENOUS at 06:34

## 2021-10-10 RX ADMIN — ACETAMINOPHEN 650 MG: 325 TABLET ORAL at 17:35

## 2021-10-10 NOTE — ED NOTES
Spoke w/ Dr. Mazin Murphy and confirmed that pt no longer needs to be NPO as no surgical procedures are planned for today. Pt given extra tray, regular diet ordered.

## 2021-10-10 NOTE — ED PROVIDER NOTES
EMERGENCY DEPARTMENT HISTORY AND PHYSICAL EXAM      Date: 10/10/2021  Patient Name: Robin Bowman    History of Presenting Illness     Chief Complaint   Patient presents with    Flank Pain       History Provided By: Patient    HPI: Robin Bowman, 39 y.o. female with a past medical history significant Kidney stones presents to the ED with cc of left-sided flank pain. Patient states she awoke this morning with sharp left-sided flank pain, over the last several hours noticed radiation of pain down to left lower quadrant and has become more dull. Additionally patient complaining of some nausea and vomiting, denies any fevers chills denies any pain or burning on urination no hematuria. Patient states this pain is typical of her kidney stone pain. There are no other complaints, changes, or physical findings at this time. PCP: Sheridan Christianson MD    Current Facility-Administered Medications   Medication Dose Route Frequency Provider Last Rate Last Admin    cefTRIAXone (ROCEPHIN) 2 g in sterile water (preservative free) 20 mL IV syringe  2 g IntraVENous NOW Herminia Vizcaino MD         Current Outpatient Medications   Medication Sig Dispense Refill    ibuprofen (MOTRIN) 400 mg tablet Take 1 Tablet by mouth every six (6) hours as needed for Pain. 20 Tablet 0    cholecalciferol, vitamin D3, (VITAMIN D3 PO) Take  by mouth. (Patient not taking: Reported on 2021)      ibuprofen (MOTRIN) 800 mg tablet Take 1 Tab by mouth every eight (8) hours as needed for Pain. 21 Tab 0       Past History     Past Medical History:  Past Medical History:   Diagnosis Date    Kidney calculi        Past Surgical History:  Past Surgical History:   Procedure Laterality Date    HX  SECTION      HX LITHOTRIPSY      \"multiple times but  was most recent\"    HX LITHOTRIPSY      HX TUBAL LIGATION         Family History:  History reviewed. No pertinent family history.     Social History:  Social History Tobacco Use    Smoking status: Current Every Day Smoker     Packs/day: 0.50     Years: 10.00     Pack years: 5.00    Smokeless tobacco: Current User   Substance Use Topics    Alcohol use: No     Comment: rarely    Drug use: No       Allergies: Allergies   Allergen Reactions    Ciprofloxacin Other (comments)     Thrush         Review of Systems     Review of Systems   Constitutional: Negative for activity change, appetite change, chills and fever. HENT: Negative for sinus pain and sore throat. Respiratory: Negative for cough and shortness of breath. Cardiovascular: Negative for chest pain and palpitations. Gastrointestinal: Positive for nausea. Negative for abdominal pain, diarrhea and vomiting. Genitourinary: Positive for flank pain. Negative for dysuria and hematuria. Musculoskeletal: Positive for back pain. Negative for myalgias. Neurological: Negative for weakness. Physical Exam     Physical Exam  Constitutional:       General: She is not in acute distress. Appearance: She is well-developed and normal weight. She is not ill-appearing, toxic-appearing or diaphoretic. HENT:      Head: Normocephalic and atraumatic. Mouth/Throat:      Mouth: Mucous membranes are moist.   Eyes:      Extraocular Movements: Extraocular movements intact. Cardiovascular:      Rate and Rhythm: Normal rate and regular rhythm. Heart sounds: Normal heart sounds. Pulmonary:      Effort: Pulmonary effort is normal.      Breath sounds: Normal breath sounds. Abdominal:      General: Abdomen is flat. Bowel sounds are normal. There is no distension. Palpations: Abdomen is soft. Tenderness: There is no abdominal tenderness. There is left CVA tenderness. Skin:     General: Skin is warm and dry. Capillary Refill: Capillary refill takes less than 2 seconds. Coloration: Skin is not jaundiced. Neurological:      General: No focal deficit present.       Mental Status: She is alert and oriented to person, place, and time. Psychiatric:         Mood and Affect: Mood normal.         Behavior: Behavior normal.         Diagnostic Study Results     Labs -     Recent Results (from the past 12 hour(s))   CBC WITH AUTOMATED DIFF    Collection Time: 10/10/21  6:30 AM   Result Value Ref Range    WBC 12.7 (H) 3.6 - 11.0 K/uL    RBC 4.99 3.80 - 5.20 M/uL    HGB 14.0 11.5 - 16.0 g/dL    HCT 43.1 35.0 - 47.0 %    MCV 86.4 80.0 - 99.0 FL    MCH 28.1 26.0 - 34.0 PG    MCHC 32.5 30.0 - 36.5 g/dL    RDW 14.0 11.5 - 14.5 %    PLATELET 291 (H) 388 - 400 K/uL    MPV 10.8 8.9 - 12.9 FL    NRBC 0.0 0.0  WBC    ABSOLUTE NRBC 0.00 0.00 - 0.01 K/uL    NEUTROPHILS 75 32 - 75 %    LYMPHOCYTES 16 12 - 49 %    MONOCYTES 5 5 - 13 %    EOSINOPHILS 3 0 - 7 %    BASOPHILS 1 0 - 1 %    IMMATURE GRANULOCYTES 0 0 - 0.5 %    ABS. NEUTROPHILS 9.4 (H) 1.8 - 8.0 K/UL    ABS. LYMPHOCYTES 2.0 0.8 - 3.5 K/UL    ABS. MONOCYTES 0.7 0.0 - 1.0 K/UL    ABS. EOSINOPHILS 0.4 0.0 - 0.4 K/UL    ABS. BASOPHILS 0.1 0.0 - 0.1 K/UL    ABS. IMM. GRANS. 0.0 0.00 - 0.04 K/UL    DF AUTOMATED     METABOLIC PANEL, COMPREHENSIVE    Collection Time: 10/10/21  6:30 AM   Result Value Ref Range    Sodium 140 136 - 145 mmol/L    Potassium 4.3 3.5 - 5.1 mmol/L    Chloride 110 (H) 97 - 108 mmol/L    CO2 24 21 - 32 mmol/L    Anion gap 6 5 - 15 mmol/L    Glucose 102 (H) 65 - 100 mg/dL    BUN 14 6 - 20 mg/dL    Creatinine 0.88 0.55 - 1.02 mg/dL    BUN/Creatinine ratio 16 12 - 20      GFR est AA >60 >60 ml/min/1.73m2    GFR est non-AA >60 >60 ml/min/1.73m2    Calcium 9.8 8.5 - 10.1 mg/dL    Bilirubin, total 0.3 0.2 - 1.0 mg/dL    AST (SGOT) 19 15 - 37 U/L    ALT (SGPT) 16 12 - 78 U/L    Alk.  phosphatase 137 (H) 45 - 117 U/L    Protein, total 7.7 6.4 - 8.2 g/dL    Albumin 3.9 3.5 - 5.0 g/dL    Globulin 3.8 2.0 - 4.0 g/dL    A-G Ratio 1.0 (L) 1.1 - 2.2     URINALYSIS W/ RFLX MICROSCOPIC    Collection Time: 10/10/21  6:30 AM   Result Value Ref Range Color Yellow/Straw      Appearance Turbid (A) Clear      Specific gravity 1.020 1.003 - 1.030      pH (UA) 5.0 5.0 - 8.0      Protein 100 (A) Negative mg/dL    Glucose Negative Negative mg/dL    Ketone 5 (A) Negative mg/dL    Bilirubin Negative Negative      Blood Moderate (A) Negative      Urobilinogen 0.1 0.1 - 1.0 EU/dL    Nitrites Negative Negative      Leukocyte Esterase Moderate (A) Negative      WBC  0 - 4 /hpf    RBC  0 - 5 /hpf    Bacteria Negative Negative /hpf    Mucus 2+ /lpf   HCG URINE, QL    Collection Time: 10/10/21  6:30 AM   Result Value Ref Range    HCG urine, QL Negative Negative     URINE MICROSCOPIC    Collection Time: 10/10/21  6:30 AM   Result Value Ref Range    WBC PENDING /hpf    RBC PENDING /hpf    Bacteria PENDING /hpf   PROTHROMBIN TIME + INR    Collection Time: 10/10/21  7:45 AM   Result Value Ref Range    Prothrombin time 14.1 11.9 - 14.7 sec    INR 1.1 0.9 - 1.1         Radiologic Studies -   [unfilled]  CT Results  (Last 48 hours)               10/10/21 0814  CT ABD PELV WO CONT Final result    Impression:  Left urinary tract obstruction as above. Narrative:  CT abdomen and pelvis without contrast:       Comparison 12/19/2020       Dose reduction: All CT scans at this facility are performed using dose reduction   optimization techniques as appropriate to a performed exam including the   following: Automated exposure control, adjustments of the mA and/or kV according   to patient size, or use of iterative reconstruction technique. An emergency noncontrast axial study was performed with coronal and sagittal   reconstructions. A 3 mm noncalcified left lower lobe nodule is unchanged, series 204, image 13. There are no acute abnormalities in the liver, spleen or pancreas. The   gallbladder is incompletely distended without stones calcified stones. There is no adrenal mass bilaterally. There are calcified renal stones bilaterally.  There is moderate obstruction of   the left kidney and left ureter secondary to a 9 x 13 mm calcified stone   positioned in the mid left ureter (L3 level). The urinary bladder is smooth in contour. An IUD is in place. There are bilateral tubal ligation clips. The uterus is not   enlarged. The aorta is not aneurysmal.       There is no bowel distention. The appendix is normal.       There is no free air or free fluid in the abdomen or pelvis. There is disc osteophyte complex formation at L2-3. CXR Results  (Last 48 hours)    None          Medical Decision Making and ED Course   I am the first provider for this patient. I reviewed the vital signs, available nursing notes, past medical history, past surgical history, family history and social history. Vital Signs-Reviewed the patient's vital signs. Patient Vitals for the past 12 hrs:   Temp Pulse Resp BP SpO2   10/10/21 0629 98.3 °F (36.8 °C) 86 18 (!) 157/84 96 %         Records Reviewed: Nursing Notes    The patient presents with flank pain with a differential diagnosis of renal colic, hydronephrosis, obstruction renal stone, pyelonephritis,      Provider Notes (Medical Decision Making):     MDM   43-year-old female history of kidney stones, presents emergency department with acute onset left-sided flank pain. And also complaining of associated nausea vomiting. Exam shows well-appearing female, mild discomfort, no distress, stable vitals, patient afebrile. Patient's abdomen soft mild left lower quadrant tender palpation, left CVA tenderness. Will draw basic lab work, UA, abdominal CT to evaluate for renal stone. Toradol, Zofran, fluids ordered    ED Course:   Initial assessment performed. The patients presenting problems have been discussed, and they are in agreement with the care plan formulated and outlined with them. I have encouraged them to ask questions as they arise throughout their visit.            Procedures Panchito Melissa MD  Procedures     Consulted Dr. Lupe Zimmerman urology advised to admit patient with IV antibiotic and he will be on consult          Disposition           Remove if not discharged  DISCHARGE PLAN:  1. Current Discharge Medication List      CONTINUE these medications which have NOT CHANGED    Details   !! ibuprofen (MOTRIN) 400 mg tablet Take 1 Tablet by mouth every six (6) hours as needed for Pain. Qty: 20 Tablet, Refills: 0      cholecalciferol, vitamin D3, (VITAMIN D3 PO) Take  by mouth. !! ibuprofen (MOTRIN) 800 mg tablet Take 1 Tab by mouth every eight (8) hours as needed for Pain. Qty: 21 Tab, Refills: 0       !! - Potential duplicate medications found. Please discuss with provider. 2.   Follow-up Information    None       3. Return to ED if worse     Diagnosis     Clinical Impression:   1. Renal colic    2. Urinary tract infection with hematuria, site unspecified    3. Ureterolithiasis    4. Leukocytosis, unspecified type        Attestations:    Panchito Melissa MD    Please note that this dictation was completed with [employer], the computer voice recognition software. Quite often unanticipated grammatical, syntax, homophones, and other interpretive errors are inadvertently transcribed by the computer software. Please disregard these errors. Please excuse any errors that have escaped final proofreading. Thank you.

## 2021-10-10 NOTE — ROUTINE PROCESS
TRANSFER - OUT REPORT:    Verbal report given to Susie Church RN(name) on Mickiel Nares  being transferred to [facility] (unit) for ordered procedure       Report consisted of patients Situation, Background, Assessment and   Recommendations(SBAR). Information from the following report(s) SBAR, Kardex, ED Summary, [facility] and Recent Results was reviewed with the receiving nurse. Lines:   Peripheral IV 10/10/21 Left Antecubital (Active)   Site Assessment Clean, dry, & intact 10/10/21 0632   Phlebitis Assessment 0 10/10/21 0632   Infiltration Assessment 0 10/10/21 0632   Dressing Status Clean, dry, & intact 10/10/21 8716        Opportunity for questions and clarification was provided.       Patient transported with:   Tech, pt chart, personal belongings

## 2021-10-10 NOTE — CONSULTS
UROLOGY CONSULT NOTE  [phone]        Patient: Nino Norris MRN: [id number]  SSN: [id number]    YOB: 1985  Age: 39 y.o. Sex: female      REFERRING PROVIDER: Lucien Luciano MD  Subjective:      Nino Norris is a 39 y.o. female who is being seen in urologic consultation for patient has a large mid ureteral stone obstruction. Patient has had kidney stones and she was 16. She says she was never worked up. Present has flank pain left lower side. Severe pain. She has nausea and vomiting but no fevers and chills no dysuria or hematuria. Found to have a 9 x 13 mm stone mid ureter admitted for pain control. There are calcified stones in both kidneys though no size is mentioned by radiology    Past Medical History:   Diagnosis Date    Kidney calculi      Past Surgical History:   Procedure Laterality Date    HX  SECTION      HX LITHOTRIPSY  2017    \"multiple times but 2017 was most recent\"    HX LITHOTRIPSY      HX TUBAL LIGATION        History reviewed. No pertinent family history. Social History     Tobacco Use    Smoking status: Current Every Day Smoker     Packs/day: 0.50     Years: 10.00     Pack years: 5.00    Smokeless tobacco: Current User   Substance Use Topics    Alcohol use: No     Comment: rarely      Current Outpatient Medications   Medication Sig Dispense Refill    ibuprofen (MOTRIN) 400 mg tablet Take 1 Tablet by mouth every six (6) hours as needed for Pain. 20 Tablet 0    cholecalciferol, vitamin D3, (VITAMIN D3 PO) Take  by mouth. (Patient not taking: Reported on 2021)      ibuprofen (MOTRIN) 800 mg tablet Take 1 Tab by mouth every eight (8) hours as needed for Pain. 21 Tab 0        Allergies   Allergen Reactions    Ciprofloxacin Other (comments)     Thrush       Review of Systems:  ROS  Review of Systems   Constitutional: Negative for activity change, appetite change, chills and fever. HENT: Negative for sinus pain and sore throat. Respiratory: Negative for cough and shortness of breath. Cardiovascular: Negative for chest pain and palpitations. Gastrointestinal: Positive for nausea. Negative for abdominal pain, diarrhea and vomiting. Genitourinary: Positive for flank pain. Negative for dysuria and hematuria. Musculoskeletal: Positive for back pain. Negative for myalgias. Neurological: Negative for weakness  Objective:     Vitals:    10/10/21 0629 10/10/21 0941   BP: (!) 157/84 138/88   Pulse: 86 67   Resp: 18 18   Temp: 98.3 °F (36.8 °C)    SpO2: 96% 97%   Weight: 220 lb (99.8 kg)    Height: 5' 5\" (1.651 m)         Recent Labs     10/10/21  0630   WBC 12.7*   HGB 14.0   HCT 43.1   *     Recent Labs     10/10/21  0745 10/10/21  0630   NA  --  140   K  --  4.3   CL  --  110*   CO2  --  24   GLU  --  102*   BUN  --  14   CREA  --  0.88   CA  --  9.8   ALB  --  3.9   TBILI  --  0.3   ALT  --  16   INR 1.1  --      No results for input(s): PH, PCO2, PO2, HCO3, FIO2 in the last 72 hours. 24 Hour Results:  Recent Results (from the past 24 hour(s))   CBC WITH AUTOMATED DIFF    Collection Time: 10/10/21  6:30 AM   Result Value Ref Range    WBC 12.7 (H) 3.6 - 11.0 K/uL    RBC 4.99 3.80 - 5.20 M/uL    HGB 14.0 11.5 - 16.0 g/dL    HCT 43.1 35.0 - 47.0 %    MCV 86.4 80.0 - 99.0 FL    MCH 28.1 26.0 - 34.0 PG    MCHC 32.5 30.0 - 36.5 g/dL    RDW 14.0 11.5 - 14.5 %    PLATELET 684 (H) 411 - 400 K/uL    MPV 10.8 8.9 - 12.9 FL    NRBC 0.0 0.0  WBC    ABSOLUTE NRBC 0.00 0.00 - 0.01 K/uL    NEUTROPHILS 75 32 - 75 %    LYMPHOCYTES 16 12 - 49 %    MONOCYTES 5 5 - 13 %    EOSINOPHILS 3 0 - 7 %    BASOPHILS 1 0 - 1 %    IMMATURE GRANULOCYTES 0 0 - 0.5 %    ABS. NEUTROPHILS 9.4 (H) 1.8 - 8.0 K/UL    ABS. LYMPHOCYTES 2.0 0.8 - 3.5 K/UL    ABS. MONOCYTES 0.7 0.0 - 1.0 K/UL    ABS. EOSINOPHILS 0.4 0.0 - 0.4 K/UL    ABS. BASOPHILS 0.1 0.0 - 0.1 K/UL    ABS. IMM.  GRANS. 0.0 0.00 - 0.04 K/UL    DF AUTOMATED     METABOLIC PANEL, COMPREHENSIVE Collection Time: 10/10/21  6:30 AM   Result Value Ref Range    Sodium 140 136 - 145 mmol/L    Potassium 4.3 3.5 - 5.1 mmol/L    Chloride 110 (H) 97 - 108 mmol/L    CO2 24 21 - 32 mmol/L    Anion gap 6 5 - 15 mmol/L    Glucose 102 (H) 65 - 100 mg/dL    BUN 14 6 - 20 mg/dL    Creatinine 0.88 0.55 - 1.02 mg/dL    BUN/Creatinine ratio 16 12 - 20      GFR est AA >60 >60 ml/min/1.73m2    GFR est non-AA >60 >60 ml/min/1.73m2    Calcium 9.8 8.5 - 10.1 mg/dL    Bilirubin, total 0.3 0.2 - 1.0 mg/dL    AST (SGOT) 19 15 - 37 U/L    ALT (SGPT) 16 12 - 78 U/L    Alk. phosphatase 137 (H) 45 - 117 U/L    Protein, total 7.7 6.4 - 8.2 g/dL    Albumin 3.9 3.5 - 5.0 g/dL    Globulin 3.8 2.0 - 4.0 g/dL    A-G Ratio 1.0 (L) 1.1 - 2.2     URINALYSIS W/ RFLX MICROSCOPIC    Collection Time: 10/10/21  6:30 AM   Result Value Ref Range    Color Yellow/Straw      Appearance Turbid (A) Clear      Specific gravity 1.020 1.003 - 1.030      pH (UA) 5.0 5.0 - 8.0      Protein 100 (A) Negative mg/dL    Glucose Negative Negative mg/dL    Ketone 5 (A) Negative mg/dL    Bilirubin Negative Negative      Blood Moderate (A) Negative      Urobilinogen 0.1 0.1 - 1.0 EU/dL    Nitrites Negative Negative      Leukocyte Esterase Moderate (A) Negative      WBC  0 - 4 /hpf    RBC  0 - 5 /hpf    Bacteria Negative Negative /hpf    Mucus 2+ /lpf   HCG URINE, QL    Collection Time: 10/10/21  6:30 AM   Result Value Ref Range    HCG urine, QL Negative Negative     URINE MICROSCOPIC    Collection Time: 10/10/21  6:30 AM   Result Value Ref Range    WBC  0 - 4 /hpf    RBC  0 - 5 /hpf    Bacteria Negative Negative /hpf   PROTHROMBIN TIME + INR    Collection Time: 10/10/21  7:45 AM   Result Value Ref Range    Prothrombin time 14.1 11.9 - 14.7 sec    INR 1.1 0.9 - 1.1         Physical Exam:  Physical Exam  Physical Exam  Constitutional:       General: She is not in acute distress. Appearance: She is well-developed and normal weight.  She is not ill-appearing, toxic-appearing or diaphoretic. HENT:      Head: Normocephalic and atraumatic. Mouth/Throat:      Mouth: Mucous membranes are moist.   Eyes:      Extraocular Movements: Extraocular movements intact. Cardiovascular:      Rate and Rhythm: Normal rate and regular rhythm. Heart sounds: Normal heart sounds. Pulmonary:      Effort: Pulmonary effort is normal.      Breath sounds: Normal breath sounds. Abdominal:      General: Abdomen is flat. Bowel sounds are normal. There is no distension. Palpations: Abdomen is soft. Tenderness: There is no abdominal tenderness. There is left CVA tenderness. Skin:     General: Skin is warm and dry. Capillary Refill: Capillary refill takes less than 2 seconds. Coloration: Skin is not jaundiced. Neurological:      General: No focal deficit present. Mental Status: She is alert and oriented to person, place, and time.    Psychiatric:         Mood and Affect: Mood normal.         Behavior: Behavior normal    Assessment: Metabolic stone disease with large mid ureteral stone on left         Plan: Would like to do ESWL she may need a stent in the interim she also needs a full stone work-up             Signed By: Romayne Bowers, MD     October 10, 2021

## 2021-10-11 LAB
ALBUMIN SERPL-MCNC: 2.9 G/DL (ref 3.5–5)
ALBUMIN/GLOB SERPL: 0.9 {RATIO} (ref 1.1–2.2)
ALP SERPL-CCNC: 106 U/L (ref 45–117)
ALT SERPL-CCNC: 12 U/L (ref 12–78)
ANION GAP SERPL CALC-SCNC: 5 MMOL/L (ref 5–15)
AST SERPL W P-5'-P-CCNC: 12 U/L (ref 15–37)
BACTERIA SPEC CULT: NORMAL
BASOPHILS # BLD: 0.1 K/UL (ref 0–0.1)
BASOPHILS NFR BLD: 1 % (ref 0–1)
BILIRUB SERPL-MCNC: 0.2 MG/DL (ref 0.2–1)
BUN SERPL-MCNC: 17 MG/DL (ref 6–20)
BUN/CREAT SERPL: 24 (ref 12–20)
CA-I BLD-MCNC: 8.8 MG/DL (ref 8.5–10.1)
CHLORIDE SERPL-SCNC: 111 MMOL/L (ref 97–108)
CO2 SERPL-SCNC: 25 MMOL/L (ref 21–32)
CREAT SERPL-MCNC: 0.72 MG/DL (ref 0.55–1.02)
DIFFERENTIAL METHOD BLD: NORMAL
EOSINOPHIL # BLD: 0.4 K/UL (ref 0–0.4)
EOSINOPHIL NFR BLD: 6 % (ref 0–7)
ERYTHROCYTE [DISTWIDTH] IN BLOOD BY AUTOMATED COUNT: 14.1 % (ref 11.5–14.5)
GLOBULIN SER CALC-MCNC: 3.1 G/DL (ref 2–4)
GLUCOSE SERPL-MCNC: 99 MG/DL (ref 65–100)
HCT VFR BLD AUTO: 39.1 % (ref 35–47)
HGB BLD-MCNC: 12.2 G/DL (ref 11.5–16)
IMM GRANULOCYTES # BLD AUTO: 0 K/UL (ref 0–0.04)
IMM GRANULOCYTES NFR BLD AUTO: 0 % (ref 0–0.5)
LYMPHOCYTES # BLD: 2.2 K/UL (ref 0.8–3.5)
LYMPHOCYTES NFR BLD: 32 % (ref 12–49)
MCH RBC QN AUTO: 27.6 PG (ref 26–34)
MCHC RBC AUTO-ENTMCNC: 31.2 G/DL (ref 30–36.5)
MCV RBC AUTO: 88.5 FL (ref 80–99)
MONOCYTES # BLD: 0.4 K/UL (ref 0–1)
MONOCYTES NFR BLD: 6 % (ref 5–13)
NEUTS SEG # BLD: 3.7 K/UL (ref 1.8–8)
NEUTS SEG NFR BLD: 55 % (ref 32–75)
NRBC # BLD: 0 K/UL (ref 0–0.01)
NRBC BLD-RTO: 0 PER 100 WBC
PLATELET # BLD AUTO: 289 K/UL (ref 150–400)
PMV BLD AUTO: 10.8 FL (ref 8.9–12.9)
POTASSIUM SERPL-SCNC: 3.9 MMOL/L (ref 3.5–5.1)
PROT SERPL-MCNC: 6 G/DL (ref 6.4–8.2)
RBC # BLD AUTO: 4.42 M/UL (ref 3.8–5.2)
SODIUM SERPL-SCNC: 141 MMOL/L (ref 136–145)
SPECIAL REQUESTS,SREQ: NORMAL
WBC # BLD AUTO: 6.8 K/UL (ref 3.6–11)

## 2021-10-11 PROCEDURE — 36415 COLL VENOUS BLD VENIPUNCTURE: CPT

## 2021-10-11 PROCEDURE — 74011000250 HC RX REV CODE- 250: Performed by: FAMILY MEDICINE

## 2021-10-11 PROCEDURE — 65270000029 HC RM PRIVATE

## 2021-10-11 PROCEDURE — 74011250637 HC RX REV CODE- 250/637: Performed by: FAMILY MEDICINE

## 2021-10-11 PROCEDURE — 80053 COMPREHEN METABOLIC PANEL: CPT

## 2021-10-11 PROCEDURE — 74011250636 HC RX REV CODE- 250/636: Performed by: FAMILY MEDICINE

## 2021-10-11 PROCEDURE — 85025 COMPLETE CBC W/AUTO DIFF WBC: CPT

## 2021-10-11 RX ORDER — KETOROLAC TROMETHAMINE 30 MG/ML
30 INJECTION, SOLUTION INTRAMUSCULAR; INTRAVENOUS
Status: DISCONTINUED | OUTPATIENT
Start: 2021-10-11 | End: 2021-10-13 | Stop reason: HOSPADM

## 2021-10-11 RX ADMIN — KETOROLAC TROMETHAMINE 30 MG: 30 INJECTION, SOLUTION INTRAMUSCULAR; INTRAVENOUS at 20:45

## 2021-10-11 RX ADMIN — ACETAMINOPHEN 650 MG: 325 TABLET ORAL at 00:45

## 2021-10-11 RX ADMIN — TRAMADOL HYDROCHLORIDE 50 MG: 50 TABLET, FILM COATED ORAL at 09:43

## 2021-10-11 RX ADMIN — CEFTRIAXONE 1 G: 1 INJECTION, POWDER, FOR SOLUTION INTRAMUSCULAR; INTRAVENOUS at 09:43

## 2021-10-11 RX ADMIN — TRAMADOL HYDROCHLORIDE 50 MG: 50 TABLET, FILM COATED ORAL at 15:50

## 2021-10-11 RX ADMIN — SODIUM CHLORIDE 75 ML/HR: 9 INJECTION, SOLUTION INTRAVENOUS at 17:14

## 2021-10-11 NOTE — PROGRESS NOTES
UROLOGY Progress Note         [phone]      Daily Progress Note: 10/11/2021      Subjective: The patient is seen for UROLOGIC follow up secondary to her left ureteral stone. She has a long history of kidney/ureteral stones. She developed them at age 12. She reports being seen in the past by both Dr. Abe Aponte and Dr. Marty Chow. She has had multiple interventions per her recollection. CT scan 10/10/2021 did show a left ureteral calcification 9 x 13 mm in the mid ureter. Additionally, there are bilateral calcified renal stones, right > left; the largest of which measures 5.6 mm (right side). Today, her pain is well controlled. She feels comfortable. Problem List:  Patient Active Problem List   Diagnosis Code    Renal stone N20.0         Medications reviewed  Current Facility-Administered Medications   Medication Dose Route Frequency    0.9% sodium chloride infusion  75 mL/hr IntraVENous CONTINUOUS    acetaminophen (TYLENOL) tablet 650 mg  650 mg Oral Q6H PRN    Or    acetaminophen (TYLENOL) suppository 650 mg  650 mg Rectal Q6H PRN    polyethylene glycol (MIRALAX) packet 17 g  17 g Oral DAILY PRN    ondansetron (ZOFRAN ODT) tablet 4 mg  4 mg Oral Q8H PRN    Or    ondansetron (ZOFRAN) injection 4 mg  4 mg IntraVENous Q6H PRN    enoxaparin (LOVENOX) injection 40 mg  40 mg SubCUTAneous DAILY    traMADoL (ULTRAM) tablet 50 mg  50 mg Oral Q6H PRN    cefTRIAXone (ROCEPHIN) 1 g in sterile water (preservative free) 10 mL IV syringe  1 g IntraVENous Q24H       Review of Systems:   Review of Systems   Constitutional: Negative for chills and fever. Gastrointestinal: Negative for abdominal pain, nausea and vomiting. Genitourinary: Positive for flank pain. Negative for dysuria and hematuria. Adequate pain control. Objective:   Physical Exam  Vitals and nursing note reviewed. Constitutional:       Appearance: Normal appearance. She is not ill-appearing.    HENT: Mouth/Throat:      Pharynx: Oropharynx is clear. Eyes:      Extraocular Movements: Extraocular movements intact. Pulmonary:      Effort: Pulmonary effort is normal. No respiratory distress. Genitourinary:     Comments: Voiding  Musculoskeletal:         General: Normal range of motion. Cervical back: Normal range of motion. Skin:     General: Skin is warm and dry. Neurological:      Mental Status: She is alert and oriented to person, place, and time. Psychiatric:         Behavior: Behavior normal.         Thought Content: Thought content normal.          Visit Vitals  BP (!) 156/78 (BP 1 Location: Right upper arm, BP Patient Position: At rest)   Pulse 66   Temp 97.9 °F (36.6 °C)   Resp 18   Ht 5' 5\" (1.651 m)   Wt 220 lb (99.8 kg)   SpO2 99%   BMI 36.61 kg/m²         Data Review:       Recent Days:  Recent Labs     10/11/21  0924 10/10/21  0630   WBC 6.8 12.7*   HGB 12.2 14.0   HCT 39.1 43.1    410*     Recent Labs     10/11/21  0924 10/10/21  0745 10/10/21  0630     --  140   K 3.9  --  4.3   *  --  110*   CO2 25  --  24   GLU 99  --  102*   BUN 17  --  14   CREA 0.72  --  0.88   CA 8.8  --  9.8   ALB 2.9*  --  3.9   TBILI 0.2  --  0.3   ALT 12  --  16   INR  --  1.1  --        Assessment/     Patient Active Problem List   Diagnosis Code    Renal stone N20.0       Plan:    BILATERAL RENAL STONES: CT 10/10/21 with bilateral calcified renal stones, right > left; the largest of which measures 5.6 mm (right side). HX of renal stones. Plan for outpatient work up. May be a candidate for elective treatment. HYDRONEPHROSIS: moderate obstruction of the left kidney secondary to a mid left ureteral stone measuring 9 x 13 mm. Plan for cystoscopy, ureteroscopy, left RPG, laser lithotripsy and left ureteral stent placement on 10/12/21. LEFT URETERAL STONE: moderate obstruction of the left kidney secondary to a mid left ureteral stone measuring 9 x 13 mm.   Plan for cystoscopy, ureteroscopy, left RPG, laser lithotripsy and left ureteral stent placement on 10/12/21. LEFT ureteroscopy with laser lithotripsy and stone basketing, left RPG, and left ureteral stent placement scheduled on 10/12/21. The procedure, risk vs benefits, and expected course of recovery was explained to the patient. She had the opportunity to ask questions. She expressed understanding and wishes to proceed as planned. NPO at midnight.       Care Plan discussed with: Dr. Carrie Irby, Attending Physician  Uche Gill NP

## 2021-10-11 NOTE — PROGRESS NOTES
General Daily Progress Note          Patient Name:   Allyn Tse       YOB: 1985       Age:  39 y.o. Admit Date: 10/10/2021      Subjective:   Patient is a 39y.o. year old female past history of bilateral kidney stones came to emergency room complaining of left flank pain according to patient she wake up in the morning with sharp left sided flank pain with some nausea and some dysuria and hematuria  came to emergency room seen by the ER physician CT scan of the abdomen and the pelvis done     There are calcified renal stones bilaterally. There is moderate obstruction of  the left kidney and left ureter secondary to a 9 x 13 mm calcified stone  positioned in the mid left ureter (L3 level      Resting in bed pain much better    Patient n.p.o. for lithotripsy today        Objective:     Visit Vitals  /73 (BP 1 Location: Right upper arm, BP Patient Position: At rest)   Pulse 66   Temp 97.7 °F (36.5 °C)   Resp 17   Ht 5' 5\" (1.651 m)   Wt 99.8 kg (220 lb)   SpO2 99%   BMI 36.61 kg/m²        Recent Results (from the past 24 hour(s))   COVID-19 RAPID TEST    Collection Time: 10/10/21  7:30 PM   Result Value Ref Range    Specimen source Nasopharyngeal      COVID-19 rapid test Not Detected Not Detected       [unfilled]      Review of Systems    Constitutional: Negative for chills and fever. HENT: Negative. Eyes: Negative. Respiratory: Negative. Cardiovascular: Negative. Gastrointestinal: Negative for abdominal pain and nausea. Skin: Negative. Neurological: Negative. Physical Exam:      Constitutional: pt is oriented to person, place, and time. HENT:   Head: Normocephalic and atraumatic. Eyes: Pupils are equal, round, and reactive to light. EOM are normal.   Cardiovascular: Normal rate, regular rhythm and normal heart sounds. Pulmonary/Chest: Breath sounds normal. No wheezes. No rales. Exhibits no tenderness. Abdominal: Soft.  Bowel sounds are normal. There is no abdominal tenderness. There is no rebound and no guarding. Musculoskeletal: Normal range of motion. Neurological: pt is alert and oriented to person, place, and time. CT ABD PELV WO CONT   Final Result   Left urinary tract obstruction as above. Recent Results (from the past 24 hour(s))   COVID-19 RAPID TEST    Collection Time: 10/10/21  7:30 PM   Result Value Ref Range    Specimen source Nasopharyngeal      COVID-19 rapid test Not Detected Not Detected         Results     Procedure Component Value Units Date/Time    COVID-19 RAPID TEST [[id number]] Collected: 10/10/21 1930    Order Status: Completed Specimen: Nasopharyngeal Updated: 10/10/21 2018     Specimen source Nasopharyngeal        COVID-19 rapid test Not Detected        Comment: Rapid Abbott ID Now   Rapid NAAT:  The specimen is NEGATIVE for SARS-CoV-2, the novel coronavirus associated with COVID-19. Negative results should be treated as presumptive and, if inconsistent with clinical signs and symptoms or necessary for patient management, should be tested with an alternative molecular assay. Negative results do not preclude SARS-CoV-2 infection and should not be used as the sole basis for patient management decisions. This test has been authorized by the FDA under   an Emergency Use Authorization (EUA) for use by authorized laboratories.  Fact sheet for Healthcare Providers: ConventionUpdate.co.nz Fact sheet for Patients: ConventionUpdate.co.nz   Methodology: Isothermal Nucleic Acid Amplification         CULTURE, URINE [[id number]] Collected: 10/10/21 1215    Order Status: Completed Specimen: Urine Updated: 10/10/21 1503           Labs:     Recent Labs     10/10/21  0630   WBC 12.7*   HGB 14.0   HCT 43.1   *     Recent Labs     10/10/21  0630      K 4.3   *   CO2 24   BUN 14   CREA 0.88   *   CA 9.8     Recent Labs     10/10/21  0630   ALT 16   *   TBILI 0.3   TP 7. 7   ALB 3.9   GLOB 3.8     Recent Labs     10/10/21  0745   INR 1.1   PTP 14.1      No results for input(s): FE, TIBC, PSAT, FERR in the last 72 hours. No results found for: FOL, RBCF   No results for input(s): PH, PCO2, PO2 in the last 72 hours. No results for input(s): CPK, CKNDX, TROIQ in the last 72 hours.     No lab exists for component: CPKMB  No results found for: CHOL, CHOLX, CHLST, CHOLV, HDL, HDLP, LDL, LDLC, DLDLP, TGLX, TRIGL, TRIGP, CHHD, CHHDX  No results found for: GLUCPOC  Lab Results   Component Value Date/Time    Color Yellow/Straw 10/10/2021 06:30 AM    Appearance Turbid (A) 10/10/2021 06:30 AM    Specific gravity 1.020 10/10/2021 06:30 AM    Specific gravity 1.020 06/29/2018 09:48 PM    pH (UA) 5.0 10/10/2021 06:30 AM    Protein 100 (A) 10/10/2021 06:30 AM    Glucose Negative 10/10/2021 06:30 AM    Ketone 5 (A) 10/10/2021 06:30 AM    Bilirubin Negative 10/10/2021 06:30 AM    Urobilinogen 0.1 10/10/2021 06:30 AM    Nitrites Negative 10/10/2021 06:30 AM    Leukocyte Esterase Moderate (A) 10/10/2021 06:30 AM    Epithelial cells FEW 06/29/2018 09:48 PM    Bacteria Negative 10/10/2021 06:30 AM    Bacteria Negative 10/10/2021 06:30 AM    WBC  10/10/2021 06:30 AM    WBC  10/10/2021 06:30 AM    RBC  10/10/2021 06:30 AM    RBC  10/10/2021 06:30 AM         Assessment:        Left flank pain likely from kidney stones  UTI      Plan:     Continue IV Rocephin and IV fluids for lithotripsy today        Current Facility-Administered Medications:     0.9% sodium chloride infusion, 75 mL/hr, IntraVENous, CONTINUOUS, Imani Madrid MD, Last Rate: 75 mL/hr at 10/10/21 1255, 75 mL/hr at 10/10/21 1255    acetaminophen (TYLENOL) tablet 650 mg, 650 mg, Oral, Q6H PRN, 650 mg at 10/11/21 0045 **OR** acetaminophen (TYLENOL) suppository 650 mg, 650 mg, Rectal, Q6H PRN, Imani Madrid MD    polyethylene glycol (MIRALAX) packet 17 g, 17 g, Oral, DAILY PRN, MD Candace Umanzor ondansetron (ZOFRAN ODT) tablet 4 mg, 4 mg, Oral, Q8H PRN **OR** ondansetron (ZOFRAN) injection 4 mg, 4 mg, IntraVENous, Q6H PRN, Imani Madrid MD    enoxaparin (LOVENOX) injection 40 mg, 40 mg, SubCUTAneous, DAILY, Imani Madird MD    traMADoL Calista Garcia) tablet 50 mg, 50 mg, Oral, Q6H PRN, Imani Madrid MD, 50 mg at 10/10/21 1932    cefTRIAXone (ROCEPHIN) 1 g in sterile water (preservative free) 10 mL IV syringe, 1 g, IntraVENous, Q24H, Cece Madrid MD

## 2021-10-11 NOTE — PROGRESS NOTES
Reason for Admission:  Renal stones                     RUR Score:  5%                   Plan for utilizing home health: no plans         PCP: First and Last name:  Yoana Moran MD     Name of Practice:    Are you a current patient: Yes/No: yes   Approximate date of last visit: september 2021   Can you participate in a virtual visit with your PCP: yes                  Current Advanced Directive/Advance Care Plan: Full Code      Healthcare Decision Maker:     Grzegorz Sue Lachelle [phone]    Click here to complete [address] including selection of the Healthcare Decision Maker Relationship (ie \"Primary\")                             Transition of Care Plan:                Met with patient at bedside. Patient lives in 1 story house with her grzegorz pacheco. Is able to drive to and from doctors appointments. No use of DMEs and no plans to start now. No home health in past and no need at this time. Takes care of all her own ADLs.     DC plan is homoe selfcare

## 2021-10-11 NOTE — PROGRESS NOTES
Problem: Pain  Goal: Assess satisfaction of level of comfort and symptom control  Outcome: Progressing Towards Goal     Problem: Pain  Goal: *Control of acute pain  Outcome: Progressing Towards Goal

## 2021-10-12 ENCOUNTER — ANESTHESIA (OUTPATIENT)
Dept: SURGERY | Age: 36
DRG: 465 | End: 2021-10-12
Payer: MEDICAID

## 2021-10-12 ENCOUNTER — ANESTHESIA EVENT (OUTPATIENT)
Dept: SURGERY | Age: 36
DRG: 465 | End: 2021-10-12
Payer: MEDICAID

## 2021-10-12 ENCOUNTER — APPOINTMENT (OUTPATIENT)
Dept: GENERAL RADIOLOGY | Age: 36
DRG: 465 | End: 2021-10-12
Attending: UROLOGY
Payer: MEDICAID

## 2021-10-12 LAB
ALBUMIN SERPL-MCNC: 2.7 G/DL (ref 3.5–5)
ALBUMIN/GLOB SERPL: 0.9 {RATIO} (ref 1.1–2.2)
ALP SERPL-CCNC: 95 U/L (ref 45–117)
ALT SERPL-CCNC: 10 U/L (ref 12–78)
ANION GAP SERPL CALC-SCNC: 7 MMOL/L (ref 5–15)
AST SERPL W P-5'-P-CCNC: 11 U/L (ref 15–37)
BASOPHILS # BLD: 0 K/UL (ref 0–0.1)
BASOPHILS NFR BLD: 1 % (ref 0–1)
BILIRUB SERPL-MCNC: 0.1 MG/DL (ref 0.2–1)
BUN SERPL-MCNC: 16 MG/DL (ref 6–20)
BUN/CREAT SERPL: 22 (ref 12–20)
CA-I BLD-MCNC: 8.8 MG/DL (ref 8.5–10.1)
CHLORIDE SERPL-SCNC: 110 MMOL/L (ref 97–108)
CO2 SERPL-SCNC: 24 MMOL/L (ref 21–32)
CREAT SERPL-MCNC: 0.72 MG/DL (ref 0.55–1.02)
DIFFERENTIAL METHOD BLD: ABNORMAL
EOSINOPHIL # BLD: 0.4 K/UL (ref 0–0.4)
EOSINOPHIL NFR BLD: 5 % (ref 0–7)
ERYTHROCYTE [DISTWIDTH] IN BLOOD BY AUTOMATED COUNT: 13.8 % (ref 11.5–14.5)
GLOBULIN SER CALC-MCNC: 3 G/DL (ref 2–4)
GLUCOSE SERPL-MCNC: 97 MG/DL (ref 65–100)
HCT VFR BLD AUTO: 36.6 % (ref 35–47)
HGB BLD-MCNC: 11.4 G/DL (ref 11.5–16)
IMM GRANULOCYTES # BLD AUTO: 0 K/UL (ref 0–0.04)
IMM GRANULOCYTES NFR BLD AUTO: 0 % (ref 0–0.5)
LYMPHOCYTES # BLD: 2 K/UL (ref 0.8–3.5)
LYMPHOCYTES NFR BLD: 24 % (ref 12–49)
MCH RBC QN AUTO: 27.5 PG (ref 26–34)
MCHC RBC AUTO-ENTMCNC: 31.1 G/DL (ref 30–36.5)
MCV RBC AUTO: 88.4 FL (ref 80–99)
MONOCYTES # BLD: 0.5 K/UL (ref 0–1)
MONOCYTES NFR BLD: 6 % (ref 5–13)
NEUTS SEG # BLD: 5.3 K/UL (ref 1.8–8)
NEUTS SEG NFR BLD: 64 % (ref 32–75)
NRBC # BLD: 0 K/UL (ref 0–0.01)
NRBC BLD-RTO: 0 PER 100 WBC
PLATELET # BLD AUTO: 289 K/UL (ref 150–400)
PMV BLD AUTO: 11.3 FL (ref 8.9–12.9)
POTASSIUM SERPL-SCNC: 4.1 MMOL/L (ref 3.5–5.1)
PROT SERPL-MCNC: 5.7 G/DL (ref 6.4–8.2)
RBC # BLD AUTO: 4.14 M/UL (ref 3.8–5.2)
SODIUM SERPL-SCNC: 141 MMOL/L (ref 136–145)
WBC # BLD AUTO: 8.2 K/UL (ref 3.6–11)

## 2021-10-12 PROCEDURE — 0TF78ZZ FRAGMENTATION IN LEFT URETER, VIA NATURAL OR ARTIFICIAL OPENING ENDOSCOPIC: ICD-10-PCS | Performed by: UROLOGY

## 2021-10-12 PROCEDURE — 76210000006 HC OR PH I REC 0.5 TO 1 HR: Performed by: UROLOGY

## 2021-10-12 PROCEDURE — 76000 FLUOROSCOPY <1 HR PHYS/QHP: CPT

## 2021-10-12 PROCEDURE — 36415 COLL VENOUS BLD VENIPUNCTURE: CPT

## 2021-10-12 PROCEDURE — 76060000034 HC ANESTHESIA 1.5 TO 2 HR: Performed by: UROLOGY

## 2021-10-12 PROCEDURE — 85025 COMPLETE CBC W/AUTO DIFF WBC: CPT

## 2021-10-12 PROCEDURE — 74011250636 HC RX REV CODE- 250/636: Performed by: FAMILY MEDICINE

## 2021-10-12 PROCEDURE — 74011250636 HC RX REV CODE- 250/636: Performed by: NURSE ANESTHETIST, CERTIFIED REGISTERED

## 2021-10-12 PROCEDURE — 2709999900 HC NON-CHARGEABLE SUPPLY: Performed by: UROLOGY

## 2021-10-12 PROCEDURE — 77030012083: Performed by: UROLOGY

## 2021-10-12 PROCEDURE — 74011000250 HC RX REV CODE- 250: Performed by: UROLOGY

## 2021-10-12 PROCEDURE — 52356 CYSTO/URETERO W/LITHOTRIPSY: CPT | Performed by: UROLOGY

## 2021-10-12 PROCEDURE — 76010000162 HC OR TIME 1.5 TO 2 HR INTENSV-TIER 1: Performed by: UROLOGY

## 2021-10-12 PROCEDURE — 77030006975 HC BSKT URET RTVR BSC -D: Performed by: UROLOGY

## 2021-10-12 PROCEDURE — 74011250637 HC RX REV CODE- 250/637: Performed by: FAMILY MEDICINE

## 2021-10-12 PROCEDURE — C2617 STENT, NON-COR, TEM W/O DEL: HCPCS | Performed by: UROLOGY

## 2021-10-12 PROCEDURE — 77030020268 HC MISC GENERAL SUPPLY: Performed by: UROLOGY

## 2021-10-12 PROCEDURE — 80053 COMPREHEN METABOLIC PANEL: CPT

## 2021-10-12 PROCEDURE — 74011000250 HC RX REV CODE- 250: Performed by: FAMILY MEDICINE

## 2021-10-12 PROCEDURE — C1758 CATHETER, URETERAL: HCPCS | Performed by: UROLOGY

## 2021-10-12 PROCEDURE — 65270000029 HC RM PRIVATE

## 2021-10-12 PROCEDURE — 74011000250 HC RX REV CODE- 250: Performed by: NURSE ANESTHETIST, CERTIFIED REGISTERED

## 2021-10-12 PROCEDURE — 74011250636 HC RX REV CODE- 250/636: Performed by: ANESTHESIOLOGY

## 2021-10-12 PROCEDURE — 0T778DZ DILATION OF LEFT URETER WITH INTRALUMINAL DEVICE, VIA NATURAL OR ARTIFICIAL OPENING ENDOSCOPIC: ICD-10-PCS | Performed by: UROLOGY

## 2021-10-12 DEVICE — VARIABLE LENGTH URETERAL STENT
Type: IMPLANTABLE DEVICE | Site: URETER | Status: FUNCTIONAL
Brand: CONTOUR VL™

## 2021-10-12 RX ORDER — NORETHINDRONE AND ETHINYL ESTRADIOL 0.5-0.035
5 KIT ORAL AS NEEDED
Status: DISCONTINUED | OUTPATIENT
Start: 2021-10-12 | End: 2021-10-12 | Stop reason: HOSPADM

## 2021-10-12 RX ORDER — ONDANSETRON 2 MG/ML
INJECTION INTRAMUSCULAR; INTRAVENOUS AS NEEDED
Status: DISCONTINUED | OUTPATIENT
Start: 2021-10-12 | End: 2021-10-12 | Stop reason: HOSPADM

## 2021-10-12 RX ORDER — SODIUM CHLORIDE 0.9 % (FLUSH) 0.9 %
5-40 SYRINGE (ML) INJECTION AS NEEDED
Status: DISCONTINUED | OUTPATIENT
Start: 2021-10-12 | End: 2021-10-12 | Stop reason: HOSPADM

## 2021-10-12 RX ORDER — SODIUM CHLORIDE 0.9 % (FLUSH) 0.9 %
5-40 SYRINGE (ML) INJECTION EVERY 8 HOURS
Status: DISCONTINUED | OUTPATIENT
Start: 2021-10-12 | End: 2021-10-12 | Stop reason: HOSPADM

## 2021-10-12 RX ORDER — MIDAZOLAM HYDROCHLORIDE 1 MG/ML
0.5 INJECTION, SOLUTION INTRAMUSCULAR; INTRAVENOUS
Status: DISCONTINUED | OUTPATIENT
Start: 2021-10-12 | End: 2021-10-12 | Stop reason: HOSPADM

## 2021-10-12 RX ORDER — SODIUM CHLORIDE 0.9 % (FLUSH) 0.9 %
5-40 SYRINGE (ML) INJECTION EVERY 8 HOURS
Status: DISCONTINUED | OUTPATIENT
Start: 2021-10-12 | End: 2021-10-12

## 2021-10-12 RX ORDER — MORPHINE SULFATE 2 MG/ML
INJECTION, SOLUTION INTRAMUSCULAR; INTRAVENOUS AS NEEDED
Status: DISCONTINUED | OUTPATIENT
Start: 2021-10-12 | End: 2021-10-12 | Stop reason: HOSPADM

## 2021-10-12 RX ORDER — DEXAMETHASONE SODIUM PHOSPHATE 4 MG/ML
INJECTION, SOLUTION INTRA-ARTICULAR; INTRALESIONAL; INTRAMUSCULAR; INTRAVENOUS; SOFT TISSUE AS NEEDED
Status: DISCONTINUED | OUTPATIENT
Start: 2021-10-12 | End: 2021-10-12 | Stop reason: HOSPADM

## 2021-10-12 RX ORDER — LIDOCAINE HYDROCHLORIDE 20 MG/ML
INJECTION, SOLUTION EPIDURAL; INFILTRATION; INTRACAUDAL; PERINEURAL AS NEEDED
Status: DISCONTINUED | OUTPATIENT
Start: 2021-10-12 | End: 2021-10-12 | Stop reason: HOSPADM

## 2021-10-12 RX ORDER — HYDROCODONE BITARTRATE AND ACETAMINOPHEN 5; 325 MG/1; MG/1
1 TABLET ORAL AS NEEDED
Status: DISCONTINUED | OUTPATIENT
Start: 2021-10-12 | End: 2021-10-12 | Stop reason: HOSPADM

## 2021-10-12 RX ORDER — HYDROMORPHONE HYDROCHLORIDE 1 MG/ML
0.4 INJECTION, SOLUTION INTRAMUSCULAR; INTRAVENOUS; SUBCUTANEOUS
Status: DISCONTINUED | OUTPATIENT
Start: 2021-10-12 | End: 2021-10-12 | Stop reason: HOSPADM

## 2021-10-12 RX ORDER — METOPROLOL TARTRATE 5 MG/5ML
2.5 INJECTION INTRAVENOUS
Status: DISCONTINUED | OUTPATIENT
Start: 2021-10-12 | End: 2021-10-12 | Stop reason: HOSPADM

## 2021-10-12 RX ORDER — LIDOCAINE HYDROCHLORIDE 20 MG/ML
JELLY TOPICAL AS NEEDED
Status: DISCONTINUED | OUTPATIENT
Start: 2021-10-12 | End: 2021-10-12 | Stop reason: HOSPADM

## 2021-10-12 RX ORDER — LIDOCAINE HYDROCHLORIDE 10 MG/ML
0.1 INJECTION, SOLUTION EPIDURAL; INFILTRATION; INTRACAUDAL; PERINEURAL AS NEEDED
Status: DISCONTINUED | OUTPATIENT
Start: 2021-10-12 | End: 2021-10-12 | Stop reason: HOSPADM

## 2021-10-12 RX ORDER — HYDRALAZINE HYDROCHLORIDE 20 MG/ML
10 INJECTION INTRAMUSCULAR; INTRAVENOUS
Status: DISCONTINUED | OUTPATIENT
Start: 2021-10-12 | End: 2021-10-12 | Stop reason: HOSPADM

## 2021-10-12 RX ORDER — LABETALOL HCL 20 MG/4 ML
5 SYRINGE (ML) INTRAVENOUS
Status: DISCONTINUED | OUTPATIENT
Start: 2021-10-12 | End: 2021-10-12 | Stop reason: HOSPADM

## 2021-10-12 RX ORDER — PROPOFOL 10 MG/ML
INJECTION, EMULSION INTRAVENOUS AS NEEDED
Status: DISCONTINUED | OUTPATIENT
Start: 2021-10-12 | End: 2021-10-12 | Stop reason: HOSPADM

## 2021-10-12 RX ORDER — FENTANYL CITRATE 50 UG/ML
50 INJECTION, SOLUTION INTRAMUSCULAR; INTRAVENOUS
Status: DISCONTINUED | OUTPATIENT
Start: 2021-10-12 | End: 2021-10-12 | Stop reason: HOSPADM

## 2021-10-12 RX ORDER — EPHEDRINE SULFATE/0.9% NACL/PF 50 MG/5 ML
SYRINGE (ML) INTRAVENOUS AS NEEDED
Status: DISCONTINUED | OUTPATIENT
Start: 2021-10-12 | End: 2021-10-12

## 2021-10-12 RX ORDER — ONDANSETRON 2 MG/ML
4 INJECTION INTRAMUSCULAR; INTRAVENOUS AS NEEDED
Status: DISCONTINUED | OUTPATIENT
Start: 2021-10-12 | End: 2021-10-12 | Stop reason: HOSPADM

## 2021-10-12 RX ORDER — DIPHENHYDRAMINE HYDROCHLORIDE 50 MG/ML
12.5 INJECTION, SOLUTION INTRAMUSCULAR; INTRAVENOUS AS NEEDED
Status: DISCONTINUED | OUTPATIENT
Start: 2021-10-12 | End: 2021-10-12 | Stop reason: HOSPADM

## 2021-10-12 RX ORDER — FENTANYL CITRATE 50 UG/ML
50 INJECTION, SOLUTION INTRAMUSCULAR; INTRAVENOUS AS NEEDED
Status: DISCONTINUED | OUTPATIENT
Start: 2021-10-12 | End: 2021-10-12 | Stop reason: HOSPADM

## 2021-10-12 RX ORDER — PHENAZOPYRIDINE HYDROCHLORIDE 95 MG/1
95 TABLET ORAL
Status: DISCONTINUED | OUTPATIENT
Start: 2021-10-12 | End: 2021-10-13 | Stop reason: HOSPADM

## 2021-10-12 RX ORDER — SODIUM CHLORIDE, SODIUM LACTATE, POTASSIUM CHLORIDE, CALCIUM CHLORIDE 600; 310; 30; 20 MG/100ML; MG/100ML; MG/100ML; MG/100ML
INJECTION, SOLUTION INTRAVENOUS
Status: DISCONTINUED | OUTPATIENT
Start: 2021-10-12 | End: 2021-10-12 | Stop reason: HOSPADM

## 2021-10-12 RX ORDER — EPHEDRINE SULFATE/0.9% NACL/PF 50 MG/5 ML
SYRINGE (ML) INTRAVENOUS AS NEEDED
Status: DISCONTINUED | OUTPATIENT
Start: 2021-10-12 | End: 2021-10-12 | Stop reason: HOSPADM

## 2021-10-12 RX ORDER — MIDAZOLAM HYDROCHLORIDE 1 MG/ML
INJECTION, SOLUTION INTRAMUSCULAR; INTRAVENOUS AS NEEDED
Status: DISCONTINUED | OUTPATIENT
Start: 2021-10-12 | End: 2021-10-12 | Stop reason: HOSPADM

## 2021-10-12 RX ORDER — MIDAZOLAM HYDROCHLORIDE 1 MG/ML
1 INJECTION, SOLUTION INTRAMUSCULAR; INTRAVENOUS AS NEEDED
Status: DISCONTINUED | OUTPATIENT
Start: 2021-10-12 | End: 2021-10-12 | Stop reason: HOSPADM

## 2021-10-12 RX ADMIN — ONDANSETRON 4 MG: 2 INJECTION INTRAMUSCULAR; INTRAVENOUS at 13:27

## 2021-10-12 RX ADMIN — FENTANYL CITRATE 50 MCG: 50 INJECTION INTRAMUSCULAR; INTRAVENOUS at 14:55

## 2021-10-12 RX ADMIN — Medication 20 MG: at 14:10

## 2021-10-12 RX ADMIN — DEXAMETHASONE SODIUM PHOSPHATE 4 MG: 4 INJECTION, SOLUTION INTRA-ARTICULAR; INTRALESIONAL; INTRAMUSCULAR; INTRAVENOUS; SOFT TISSUE at 13:27

## 2021-10-12 RX ADMIN — MIDAZOLAM HYDROCHLORIDE 1 MG: 2 INJECTION, SOLUTION INTRAMUSCULAR; INTRAVENOUS at 13:10

## 2021-10-12 RX ADMIN — KETOROLAC TROMETHAMINE 30 MG: 30 INJECTION, SOLUTION INTRAMUSCULAR; INTRAVENOUS at 06:16

## 2021-10-12 RX ADMIN — POLYETHYLENE GLYCOL 3350 17 G: 17 POWDER, FOR SOLUTION ORAL at 20:51

## 2021-10-12 RX ADMIN — MORPHINE SULFATE 10 MG: 2 INJECTION, SOLUTION INTRAMUSCULAR; INTRAVENOUS at 13:26

## 2021-10-12 RX ADMIN — LIDOCAINE HYDROCHLORIDE 100 MG: 20 INJECTION, SOLUTION EPIDURAL; INFILTRATION; INTRACAUDAL; PERINEURAL at 13:17

## 2021-10-12 RX ADMIN — PROPOFOL 200 MG: 10 INJECTION, EMULSION INTRAVENOUS at 13:17

## 2021-10-12 RX ADMIN — KETOROLAC TROMETHAMINE 30 MG: 30 INJECTION, SOLUTION INTRAMUSCULAR; INTRAVENOUS at 19:07

## 2021-10-12 RX ADMIN — TRAMADOL HYDROCHLORIDE 50 MG: 50 TABLET, FILM COATED ORAL at 22:46

## 2021-10-12 RX ADMIN — PROPOFOL 50 MG: 10 INJECTION, EMULSION INTRAVENOUS at 14:05

## 2021-10-12 RX ADMIN — SODIUM CHLORIDE 75 ML/HR: 9 INJECTION, SOLUTION INTRAVENOUS at 06:16

## 2021-10-12 RX ADMIN — CEFTRIAXONE 1 G: 1 INJECTION, POWDER, FOR SOLUTION INTRAMUSCULAR; INTRAVENOUS at 08:43

## 2021-10-12 RX ADMIN — SODIUM CHLORIDE, POTASSIUM CHLORIDE, SODIUM LACTATE AND CALCIUM CHLORIDE: 600; 310; 30; 20 INJECTION, SOLUTION INTRAVENOUS at 13:10

## 2021-10-12 RX ADMIN — SODIUM CHLORIDE 75 ML/HR: 9 INJECTION, SOLUTION INTRAVENOUS at 22:43

## 2021-10-12 NOTE — ANESTHESIA POSTPROCEDURE EVALUATION
Procedure(s):  CYSTOSCOPY, URETEROSCOPY WITH LASER LITHOTRIPSY, BILATERAL RETROGRADE PYELOGRAM; LEFT URETERAL STENT PLACEMENT (URGENT).     general    Anesthesia Post Evaluation      Multimodal analgesia: multimodal analgesia used between 6 hours prior to anesthesia start to PACU discharge  Patient location during evaluation: PACU  Patient participation: complete - patient participated  Level of consciousness: awake  Pain score: 0  Pain management: adequate  Airway patency: patent  Anesthetic complications: no  Cardiovascular status: acceptable  Respiratory status: acceptable  Hydration status: acceptable  Post anesthesia nausea and vomiting:  controlled  Final Post Anesthesia Temperature Assessment:  Normothermia (36.0-37.5 degrees C)      INITIAL Post-op Vital signs:   Vitals Value Taken Time   /79 10/12/21 1553   Temp 36.4 °C (97.5 °F) 10/12/21 1553   Pulse 71 10/12/21 1553   Resp 16 10/12/21 1553   SpO2 100 % 10/12/21 1553

## 2021-10-12 NOTE — ANESTHESIA PREPROCEDURE EVALUATION
Relevant Problems   RENAL FAILURE   (+) Renal stone       Anesthetic History   No history of anesthetic complications            Review of Systems / Medical History  Patient summary reviewed, nursing notes reviewed and pertinent labs reviewed    Pulmonary  Within defined limits                 Neuro/Psych   Within defined limits           Cardiovascular  Within defined limits                Exercise tolerance: >4 METS     GI/Hepatic/Renal         Renal disease: stones       Endo/Other        Obesity     Other Findings            Past Medical History:   Diagnosis Date    Kidney calculi        Past Surgical History:   Procedure Laterality Date    HX  SECTION      HX LITHOTRIPSY  2017    \"multiple times but  was most recent\"    HX LITHOTRIPSY      HX TUBAL LIGATION         Current Outpatient Medications   Medication Instructions    cholecalciferol, vitamin D3, (VITAMIN D3 PO) Take  by mouth.     ibuprofen (MOTRIN) 800 mg, Oral, EVERY 8 HOURS AS NEEDED    ibuprofen (MOTRIN) 400 mg, Oral, EVERY 6 HOURS AS NEEDED       Current Facility-Administered Medications   Medication Dose Route Frequency    ketorolac (TORADOL) injection 30 mg  30 mg IntraVENous Q6H PRN    0.9% sodium chloride infusion  75 mL/hr IntraVENous CONTINUOUS    acetaminophen (TYLENOL) tablet 650 mg  650 mg Oral Q6H PRN    Or    acetaminophen (TYLENOL) suppository 650 mg  650 mg Rectal Q6H PRN    polyethylene glycol (MIRALAX) packet 17 g  17 g Oral DAILY PRN    ondansetron (ZOFRAN ODT) tablet 4 mg  4 mg Oral Q8H PRN    Or    ondansetron (ZOFRAN) injection 4 mg  4 mg IntraVENous Q6H PRN    enoxaparin (LOVENOX) injection 40 mg  40 mg SubCUTAneous DAILY    traMADoL (ULTRAM) tablet 50 mg  50 mg Oral Q6H PRN    cefTRIAXone (ROCEPHIN) 1 g in sterile water (preservative free) 10 mL IV syringe  1 g IntraVENous Q24H       Patient Vitals for the past 24 hrs:   Temp Pulse Resp BP SpO2   10/12/21 1136  (!) 59 18 126/87 98 % 10/12/21 0854 37.1 °C (98.8 °F) 60 18 126/75 98 %   10/12/21 0224 36.8 °C (98.3 °F) 61 18 128/72 99 %   10/11/21 2024 36.8 °C (98.2 °F) 67 18 (!) 141/74 98 %   10/11/21 1517 36.8 °C (98.3 °F) 76 17 139/73 98 %       Lab Results   Component Value Date/Time    WBC 8.2 10/12/2021 08:14 AM    HGB 11.4 (L) 10/12/2021 08:14 AM    HCT 36.6 10/12/2021 08:14 AM    PLATELET 813 90/26/3567 08:14 AM    MCV 88.4 10/12/2021 08:14 AM     Lab Results   Component Value Date/Time    Sodium 141 10/12/2021 08:14 AM    Potassium 4.1 10/12/2021 08:14 AM    Chloride 110 (H) 10/12/2021 08:14 AM    CO2 24 10/12/2021 08:14 AM    Anion gap 7 10/12/2021 08:14 AM    Glucose 97 10/12/2021 08:14 AM    BUN 16 10/12/2021 08:14 AM    Creatinine 0.72 10/12/2021 08:14 AM    BUN/Creatinine ratio 22 (H) 10/12/2021 08:14 AM    GFR est AA >60 10/12/2021 08:14 AM    GFR est non-AA >60 10/12/2021 08:14 AM    Calcium 8.8 10/12/2021 08:14 AM     No results found for: APTT, PTP, INR, INREXT  Lab Results   Component Value Date/Time    Glucose 97 10/12/2021 08:14 AM     Physical Exam    Airway  Mallampati: II  TM Distance: 4 - 6 cm  Neck ROM: normal range of motion   Mouth opening: Normal     Cardiovascular    Rhythm: regular  Rate: normal         Dental  No notable dental hx       Pulmonary  Breath sounds clear to auscultation               Abdominal         Other Findings            Anesthetic Plan    ASA: 2  Anesthesia type: general          Induction: Intravenous  Anesthetic plan and risks discussed with: Patient and Family

## 2021-10-12 NOTE — OP NOTES
Procedure note  Preop diagnosis is a large ureteral stone on left with hydronephrosis and pain  Postop diagnosis same  Procedure left ureteroscopy with laser large stone and nephroscopy with laser stones cystoscopy double-J stent placement  Anesthesia General   complication without  Indication patient with a 1.0 cm  stone with obstruction left kidney set up for ureteroscopy laser and stent placement she is aware the risk of bleeding infection injury to kidney ureter vascular injury pulmonary embolus death were of alternatives she has no questions  Procedure-patient was prepped and draped in usual sterile fashion after undergoing general anesthesia placed lithotomy position scope with initially rigid scope up the left ureteral orifice following over guidewire identified the stone. Appeared to be impacted at the UPJ. Using a 400 µm fiber low power settings as able to break up the stone to some degree and fell backwards into the kidney. I then replaced the rigid scope with a flexible scope found the stone in a calyx administered shocks the stone broke in many pieces.   The pieces were hard to retrieve the back of the kidney given the baskets we had with the metal head at the front but the stone broke up many pieces patient tolerated it well supported Glidewire up the 722/32 double-J stent placed under diagnostic fluoroscopy the bladder was emptied scope removed patient given Toradol taken off the table chorea without complication

## 2021-10-12 NOTE — PROGRESS NOTES
General Daily Progress Note          Patient Name:   Jeremy Hall       YOB: 1985       Age:  39 y.o. Admit Date: 10/10/2021      Subjective:   Patient is a 39y.o. year old female past history of bilateral kidney stones came to emergency room complaining of left flank pain according to patient she wake up in the morning with sharp left sided flank pain with some nausea and some dysuria and hematuria  came to emergency room seen by the ER physician CT scan of the abdomen and the pelvis done     There are calcified renal stones bilaterally. There is moderate obstruction of  the left kidney and left ureter secondary to a 9 x 13 mm calcified stone  positioned in the mid left ureter (L3 level      Resting in bed pain much better    No surgery was done yesterday    Patient n.p.o. for lithotripsy today        Objective:     Visit Vitals  /75 (BP 1 Location: Right upper arm, BP Patient Position: At rest;Lying)   Pulse 60   Temp 98.8 °F (37.1 °C)   Resp 18   Ht 5' 5\" (1.651 m)   Wt 99.8 kg (220 lb)   SpO2 98%   BMI 36.61 kg/m²        Recent Results (from the past 24 hour(s))   CBC WITH AUTOMATED DIFF    Collection Time: 10/12/21  8:14 AM   Result Value Ref Range    WBC 8.2 3.6 - 11.0 K/uL    RBC 4.14 3.80 - 5.20 M/uL    HGB 11.4 (L) 11.5 - 16.0 g/dL    HCT 36.6 35.0 - 47.0 %    MCV 88.4 80.0 - 99.0 FL    MCH 27.5 26.0 - 34.0 PG    MCHC 31.1 30.0 - 36.5 g/dL    RDW 13.8 11.5 - 14.5 %    PLATELET 536 655 - 908 K/uL    MPV 11.3 8.9 - 12.9 FL    NRBC 0.0 0.0  WBC    ABSOLUTE NRBC 0.00 0.00 - 0.01 K/uL    NEUTROPHILS 64 32 - 75 %    LYMPHOCYTES 24 12 - 49 %    MONOCYTES 6 5 - 13 %    EOSINOPHILS 5 0 - 7 %    BASOPHILS 1 0 - 1 %    IMMATURE GRANULOCYTES 0 0 - 0.5 %    ABS. NEUTROPHILS 5.3 1.8 - 8.0 K/UL    ABS. LYMPHOCYTES 2.0 0.8 - 3.5 K/UL    ABS. MONOCYTES 0.5 0.0 - 1.0 K/UL    ABS. EOSINOPHILS 0.4 0.0 - 0.4 K/UL    ABS. BASOPHILS 0.0 0.0 - 0.1 K/UL    ABS. IMM.  GRANS. 0.0 0.00 - 0.04 K/UL DF AUTOMATED     METABOLIC PANEL, COMPREHENSIVE    Collection Time: 10/12/21  8:14 AM   Result Value Ref Range    Sodium 141 136 - 145 mmol/L    Potassium 4.1 3.5 - 5.1 mmol/L    Chloride 110 (H) 97 - 108 mmol/L    CO2 24 21 - 32 mmol/L    Anion gap 7 5 - 15 mmol/L    Glucose 97 65 - 100 mg/dL    BUN 16 6 - 20 mg/dL    Creatinine 0.72 0.55 - 1.02 mg/dL    BUN/Creatinine ratio 22 (H) 12 - 20      GFR est AA >60 >60 ml/min/1.73m2    GFR est non-AA >60 >60 ml/min/1.73m2    Calcium 8.8 8.5 - 10.1 mg/dL    Bilirubin, total 0.1 (L) 0.2 - 1.0 mg/dL    AST (SGOT) 11 (L) 15 - 37 U/L    ALT (SGPT) 10 (L) 12 - 78 U/L    Alk. phosphatase 95 45 - 117 U/L    Protein, total 5.7 (L) 6.4 - 8.2 g/dL    Albumin 2.7 (L) 3.5 - 5.0 g/dL    Globulin 3.0 2.0 - 4.0 g/dL    A-G Ratio 0.9 (L) 1.1 - 2.2       [unfilled]      Review of Systems    Constitutional: Negative for chills and fever. HENT: Negative. Eyes: Negative. Respiratory: Negative. Cardiovascular: Negative. Gastrointestinal: Negative for abdominal pain and nausea. Skin: Negative. Neurological: Negative. Physical Exam:      Constitutional: pt is oriented to person, place, and time. HENT:   Head: Normocephalic and atraumatic. Eyes: Pupils are equal, round, and reactive to light. EOM are normal.   Cardiovascular: Normal rate, regular rhythm and normal heart sounds. Pulmonary/Chest: Breath sounds normal. No wheezes. No rales. Exhibits no tenderness. Abdominal: Soft. Bowel sounds are normal. There is no abdominal tenderness. There is no rebound and no guarding. Musculoskeletal: Normal range of motion. Neurological: pt is alert and oriented to person, place, and time. CT ABD PELV WO CONT   Final Result   Left urinary tract obstruction as above.            Recent Results (from the past 24 hour(s))   CBC WITH AUTOMATED DIFF    Collection Time: 10/12/21  8:14 AM   Result Value Ref Range    WBC 8.2 3.6 - 11.0 K/uL    RBC 4.14 3.80 - 5.20 M/uL    HGB 11.4 (L) 11.5 - 16.0 g/dL    HCT 36.6 35.0 - 47.0 %    MCV 88.4 80.0 - 99.0 FL    MCH 27.5 26.0 - 34.0 PG    MCHC 31.1 30.0 - 36.5 g/dL    RDW 13.8 11.5 - 14.5 %    PLATELET 978 187 - 421 K/uL    MPV 11.3 8.9 - 12.9 FL    NRBC 0.0 0.0  WBC    ABSOLUTE NRBC 0.00 0.00 - 0.01 K/uL    NEUTROPHILS 64 32 - 75 %    LYMPHOCYTES 24 12 - 49 %    MONOCYTES 6 5 - 13 %    EOSINOPHILS 5 0 - 7 %    BASOPHILS 1 0 - 1 %    IMMATURE GRANULOCYTES 0 0 - 0.5 %    ABS. NEUTROPHILS 5.3 1.8 - 8.0 K/UL    ABS. LYMPHOCYTES 2.0 0.8 - 3.5 K/UL    ABS. MONOCYTES 0.5 0.0 - 1.0 K/UL    ABS. EOSINOPHILS 0.4 0.0 - 0.4 K/UL    ABS. BASOPHILS 0.0 0.0 - 0.1 K/UL    ABS. IMM. GRANS. 0.0 0.00 - 0.04 K/UL    DF AUTOMATED     METABOLIC PANEL, COMPREHENSIVE    Collection Time: 10/12/21  8:14 AM   Result Value Ref Range    Sodium 141 136 - 145 mmol/L    Potassium 4.1 3.5 - 5.1 mmol/L    Chloride 110 (H) 97 - 108 mmol/L    CO2 24 21 - 32 mmol/L    Anion gap 7 5 - 15 mmol/L    Glucose 97 65 - 100 mg/dL    BUN 16 6 - 20 mg/dL    Creatinine 0.72 0.55 - 1.02 mg/dL    BUN/Creatinine ratio 22 (H) 12 - 20      GFR est AA >60 >60 ml/min/1.73m2    GFR est non-AA >60 >60 ml/min/1.73m2    Calcium 8.8 8.5 - 10.1 mg/dL    Bilirubin, total 0.1 (L) 0.2 - 1.0 mg/dL    AST (SGOT) 11 (L) 15 - 37 U/L    ALT (SGPT) 10 (L) 12 - 78 U/L    Alk. phosphatase 95 45 - 117 U/L    Protein, total 5.7 (L) 6.4 - 8.2 g/dL    Albumin 2.7 (L) 3.5 - 5.0 g/dL    Globulin 3.0 2.0 - 4.0 g/dL    A-G Ratio 0.9 (L) 1.1 - 2.2         Results     Procedure Component Value Units Date/Time    COVID-19 RAPID TEST [[id number]] Collected: 10/10/21 1930    Order Status: Completed Specimen: Nasopharyngeal Updated: 10/10/21 2018     Specimen source Nasopharyngeal        COVID-19 rapid test Not Detected        Comment: Rapid Abbott ID Now   Rapid NAAT:  The specimen is NEGATIVE for SARS-CoV-2, the novel coronavirus associated with COVID-19.    Negative results should be treated as presumptive and, if inconsistent with clinical signs and symptoms or necessary for patient management, should be tested with an alternative molecular assay. Negative results do not preclude SARS-CoV-2 infection and should not be used as the sole basis for patient management decisions. This test has been authorized by the FDA under   an Emergency Use Authorization (EUA) for use by authorized laboratories. Fact sheet for Healthcare Providers: ConventionUpdate.co.nz Fact sheet for Patients: ConventionUpdate.co.nz   Methodology: Isothermal Nucleic Acid Amplification         CULTURE, URINE [[id number]] Collected: 10/10/21 1215    Order Status: Completed Specimen: Urine Updated: 10/11/21 1952     Special Requests: No Special Requests        Culture result: No Growth (<1000 cfu/mL)              Labs:     Recent Labs     10/12/21  0814 10/11/21  0924   WBC 8.2 6.8   HGB 11.4* 12.2   HCT 36.6 39.1    289     Recent Labs     10/12/21  0814 10/11/21  0924 10/10/21  0630    141 140   K 4.1 3.9 4.3   * 111* 110*   CO2 24 25 24   BUN 16 17 14   CREA 0.72 0.72 0.88   GLU 97 99 102*   CA 8.8 8.8 9.8     Recent Labs     10/12/21  0814 10/11/21  0924 10/10/21  0630   ALT 10* 12 16   AP 95 106 137*   TBILI 0.1* 0.2 0.3   TP 5.7* 6.0* 7.7   ALB 2.7* 2.9* 3.9   GLOB 3.0 3.1 3.8     Recent Labs     10/10/21  0745   INR 1.1   PTP 14.1      No results for input(s): FE, TIBC, PSAT, FERR in the last 72 hours. No results found for: FOL, RBCF   No results for input(s): PH, PCO2, PO2 in the last 72 hours. No results for input(s): CPK, CKNDX, TROIQ in the last 72 hours.     No lab exists for component: CPKMB  No results found for: CHOL, CHOLX, CHLST, CHOLV, HDL, HDLP, LDL, LDLC, DLDLP, TGLX, TRIGL, TRIGP, CHHD, CHHDX  No results found for: GLUCPOC  Lab Results   Component Value Date/Time    Color Yellow/Straw 10/10/2021 06:30 AM    Appearance Turbid (A) 10/10/2021 06:30 AM    Specific gravity 1.020 10/10/2021 06:30 AM    Specific gravity 1.020 06/29/2018 09:48 PM    pH (UA) 5.0 10/10/2021 06:30 AM    Protein 100 (A) 10/10/2021 06:30 AM    Glucose Negative 10/10/2021 06:30 AM    Ketone 5 (A) 10/10/2021 06:30 AM    Bilirubin Negative 10/10/2021 06:30 AM    Urobilinogen 0.1 10/10/2021 06:30 AM    Nitrites Negative 10/10/2021 06:30 AM    Leukocyte Esterase Moderate (A) 10/10/2021 06:30 AM    Epithelial cells FEW 06/29/2018 09:48 PM    Bacteria Negative 10/10/2021 06:30 AM    Bacteria Negative 10/10/2021 06:30 AM    WBC  10/10/2021 06:30 AM    WBC  10/10/2021 06:30 AM    RBC  10/10/2021 06:30 AM    RBC  10/10/2021 06:30 AM         Assessment:        Left flank pain likely from kidney stones  UTI      Plan:     Continue IV Rocephin and IV fluids for lithotripsy today    Delay in surgery till today        Current Facility-Administered Medications:     ketorolac (TORADOL) injection 30 mg, 30 mg, IntraVENous, Q6H PRN, Imani Madrid MD, 30 mg at 10/12/21 0616    0.9% sodium chloride infusion, 75 mL/hr, IntraVENous, CONTINUOUS, Imani Madrid MD, Last Rate: 75 mL/hr at 10/12/21 0616, 75 mL/hr at 10/12/21 0616    acetaminophen (TYLENOL) tablet 650 mg, 650 mg, Oral, Q6H PRN, 650 mg at 10/11/21 0045 **OR** acetaminophen (TYLENOL) suppository 650 mg, 650 mg, Rectal, Q6H PRN, Nathan Madrid MD    polyethylene glycol (MIRALAX) packet 17 g, 17 g, Oral, DAILY PRN, Nathan Madrid MD    ondansetron (ZOFRAN ODT) tablet 4 mg, 4 mg, Oral, Q8H PRN **OR** ondansetron (ZOFRAN) injection 4 mg, 4 mg, IntraVENous, Q6H PRN, Imani Madrid MD    enoxaparin (LOVENOX) injection 40 mg, 40 mg, SubCUTAneous, DAILY, Imani Madrid MD    traMADoL Lorna Jenn) tablet 50 mg, 50 mg, Oral, Q6H PRN, Imani Madrid MD, 50 mg at 10/11/21 6673    cefTRIAXone (ROCEPHIN) 1 g in sterile water (preservative free) 10 mL IV syringe, 1 g, IntraVENous, Q24H, Imani Madrid MD, 1 g at 10/12/21 7099

## 2021-10-12 NOTE — DISCHARGE INSTRUCTIONS
URETEROSCOPY WITH LASER LITHOTRIPSY FOR THE TREATMENT OF KIDNEY STONES      Laser lithotripsy is a way to treat kidney stones. This treatment uses a laser to break kidney stones into tiny pieces. There are no cuts or incisions made in your skin. Your doctor performs the procedure by going through the urethra (tube that drains the urine from the bladder). Once the stone is seen, a laser fiber is used to transmit energy that breaks up your kidney stone(s). The surgeon removes some pieces through the urethra with a small basket, and smaller pieces can be passed later with urination. The surgeon may also use a high-powered laser with high-frequency emissions that \"dust\" the stones into a fine powder. You can then pass the fine particles in your urine after surgery. What You Need To Know About The Procedure:  Typically used for stones that have not been passed successfully and are lodged in the ureter (the tube that drains urine from the kidney to the bladder). Uses the energy from a laser to break up the stone(s)  It is sometimes more successful than the ESWL procedure (shockwave therapy)  It creates minimal stone fragments to pass  Usually performed in an outpatient setting (no hospital stay)    What you may experience after the procedure:   Mild bladder irritation  Mild back or side (flank) pain with urination  Blood in urine  For several hours after the procedure you may have a burning feeling when you urinate. You may feel the urge to go even if you don't need to. This feeling should go away within a day    Ureteral stent:  Stents are temporary plastic tubes that are inserted into the ureter. One end is in the kidney and the other in the bladder. Their purpose is to keep the ureter open after treatment of a stone so that the kidney is able to drain urine. Having the stent allows the edema (swelling) and inflammation present in the ureter to resolve after your treatment.   A stent is typically later removed in the office at your follow up visit. The stent is entirely internal and cannot be seen. Stents are most commonly very well tolerated. 80% of patients will perhaps only report very mild bladder irritation, but you will likely see blood in the urine. This is a normal finding with a stent. You will want to be sure to hydrate well. Anytime you have a stent in place, it is important that you urinate often. It is not uncommon to feel like you need to go more frequently or have a fullness in your abdomen or flank (side). Do not hold your urine. Do not allow your bladder to get full and uncomfortable. Empty your bladder often and remember to stay well hydrated. Medication:  You may discharged with a strong pain medication. Use Tylenol first.  If that is not resolving your pain, then take your prescription medication. Avoid NSAID's like ibuprofen as they can increase your risk for bleeding. You may or may not have an antibiotic to take. Your physician will let you know if this is necessary. If you have stent discomfort or bladder spasm, you may receive a prescription for a bladder relaxant. Your physician will discuss this with you. Activity:  You may do your regular activities. But avoid hard exercise or sports for about a week or until there is no blood in your urine. You can return to work within 24-48 hours. Call your doctor now or seek immediate medical care if:  You have pain that does not get better after you take pain medicine. You have new or more blood clots in your urine and feel that you are straining to void or empty your bladder (it is normal for the urine to be pink for a few days). You cannot urinate. A fever (temperature over 100.4F). You are sick to your stomach and cannot keep fluids down for more than 12-16 hours. Sudden pain in the calf, back of the knee, thigh, or groin with or without redness and swelling in your leg(s).     [facility] UROLOG[facility]  [phone]

## 2021-10-12 NOTE — PROGRESS NOTES
Problem: Pain  Goal: Assess satisfaction of level of comfort and symptom control  Outcome: Progressing Towards Goal  Goal: *Control of acute pain  Outcome: Progressing Towards Goal

## 2021-10-13 VITALS
RESPIRATION RATE: 18 BRPM | BODY MASS INDEX: 36.65 KG/M2 | HEIGHT: 65 IN | OXYGEN SATURATION: 99 % | WEIGHT: 220 LBS | DIASTOLIC BLOOD PRESSURE: 82 MMHG | HEART RATE: 62 BPM | SYSTOLIC BLOOD PRESSURE: 145 MMHG | TEMPERATURE: 98 F

## 2021-10-13 PROCEDURE — 74011000250 HC RX REV CODE- 250: Performed by: FAMILY MEDICINE

## 2021-10-13 PROCEDURE — 74011250636 HC RX REV CODE- 250/636: Performed by: FAMILY MEDICINE

## 2021-10-13 PROCEDURE — 74011250637 HC RX REV CODE- 250/637: Performed by: FAMILY MEDICINE

## 2021-10-13 RX ORDER — TRAMADOL HYDROCHLORIDE 50 MG/1
50 TABLET ORAL
Qty: 10 TABLET | Refills: 0 | Status: SHIPPED | OUTPATIENT
Start: 2021-10-13 | End: 2021-10-16

## 2021-10-13 RX ORDER — PHENAZOPYRIDINE HYDROCHLORIDE 95 MG/1
95 TABLET ORAL
Qty: 10 TABLET | Refills: 0 | Status: SHIPPED | OUTPATIENT
Start: 2021-10-13 | End: 2022-05-09

## 2021-10-13 RX ADMIN — CEFTRIAXONE 1 G: 1 INJECTION, POWDER, FOR SOLUTION INTRAMUSCULAR; INTRAVENOUS at 08:51

## 2021-10-13 RX ADMIN — TRAMADOL HYDROCHLORIDE 50 MG: 50 TABLET, FILM COATED ORAL at 06:59

## 2021-10-13 NOTE — PROGRESS NOTES
Problem: Pain  Goal: Assess satisfaction of level of comfort and symptom control  Outcome: Progressing Towards Goal

## 2021-10-13 NOTE — DISCHARGE SUMMARY
Discharge Summary       PATIENT ID: Virginie Edwards  MRN: [id number]   YOB: 1985    DATE OF ADMISSION: 10/10/2021  6:23 AM    DATE OF DISCHARGE:   PRIMARY CARE PROVIDER: Lieutenant Porsche MD     ATTENDING PHYSICIAN: Gretel Lundborg Mohiuddin  DISCHARGING PROVIDER: Gretel Lundborg Mohiuddin      CONSULTATIONS: IP CONSULT TO UROLOGY    PROCEDURES/SURGERIES: Procedure(s):  CYSTOSCOPY, URETEROSCOPY WITH LASER LITHOTRIPSY, BILATERAL RETROGRADE PYELOGRAM; LEFT URETERAL STENT PLACEMENT (URGENT)    ADMITTING DIAGNOSES:    Patient Active Problem List    Diagnosis Date Noted    Renal stone 10/10/2021       DISCHARGE DIAGNOSES / PLAN:      Left flank pain likely from kidney stones  left ureteroscopy with laser large stone and nephroscopy with laser stones cystoscopy double-J stent placement        DISCHARGE MEDICATIONS:  Current Discharge Medication List      START taking these medications    Details   phenazopyridine (PYRIDIUM) 95 mg tab Take 1 Tablet by mouth three (3) times daily as needed for Pain. Qty: 10 Tablet, Refills: 0  Start date: 10/13/2021      traMADoL (ULTRAM) 50 mg tablet Take 1 Tablet by mouth every six (6) hours as needed for Pain for up to 3 days. Max Daily Amount: 200 mg. Qty: 10 Tablet, Refills: 0  Start date: 10/13/2021, End date: 10/16/2021    Associated Diagnoses: Renal colic         CONTINUE these medications which have NOT CHANGED    Details   ibuprofen (MOTRIN) 400 mg tablet Take 1 Tablet by mouth every six (6) hours as needed for Pain. Qty: 20 Tablet, Refills: 0      cholecalciferol, vitamin D3, (VITAMIN D3 PO) Take  by mouth. NOTIFY YOUR PHYSICIAN FOR ANY OF THE FOLLOWING:   Fever over 101 degrees for 24 hours. Chest pain, shortness of breath, fever, chills, nausea, vomiting, diarrhea, change in mentation, falling, weakness, bleeding. Severe pain or pain not relieved by medications. Or, any other signs or symptoms that you may have questions about.     DISPOSITION:  x  Home With: OT  PT  HH  RN       Long term SNF/Inpatient Rehab    Independent/assisted living    Hospice    Other:       PATIENT CONDITION AT DISCHARGE: Stable      PHYSICAL EXAMINATION AT DISCHARGE:  General:          Alert, cooperative, no distress, appears stated age. HEENT:           Atraumatic, anicteric sclerae, pink conjunctivae                          No oral ulcers, mucosa moist, throat clear, dentition fair  Neck:               Supple, symmetrical  Lungs:             Clear to auscultation bilaterally. No Wheezing or Rhonchi. No rales. Chest wall:      No tenderness  No Accessory muscle use. Heart:              Regular  rhythm,  No  murmur   No edema  Abdomen:        Soft, non-tender. Not distended. Bowel sounds normal  Extremities:     No cyanosis. No clubbing,                            Skin turgor normal, Capillary refill normal  Skin:                Not pale. Not Jaundiced  No rashes   Psych:             Not anxious or agitated. Neurologic:      Alert, moves all extremities, answers questions appropriately and responds to commands     CT ABD PELV WO CONT   Final Result   Left urinary tract obstruction as above. XR FLUOROSCOPY UNDER 60 MINUTES    (Results Pending)        No results found for this or any previous visit (from the past 24 hour(s)). HOSPITAL COURSE:    Patient is a 43 y.o. year old female past history of bilateral kidney stones came to emergency room complaining of left flank pain according to patient she wake up in the morning with sharp left sided flank pain with some nausea and some dysuria and hematuria  came to emergency room seen by the ER physician CT scan of the abdomen and the pelvis done     There are calcified renal stones bilaterally.  There is moderate obstruction of  the left kidney and left ureter secondary to a 9 x 13 mm calcified stone  positioned in the mid left ureter (L3 level    She was seen by urology and patient cystoscopy done which shows  left ureteroscopy with laser large stone and nephroscopy with laser stones cystoscopy double-J stent placement    Patient tolerated the procedure well    Urine cultures was negative discharge home discussed with urology    Follow-up with urology in 1 to 2 weeks    Acacian reconciliation done time discharge patient 35 minutes 50% time spent on counseling and coordination of care      Signed:   Bandar Thomas MD  10/13/2021  9:18 AM

## 2021-10-13 NOTE — PROGRESS NOTES
Problem: Pain  Goal: Assess satisfaction of level of comfort and symptom control  Outcome: Progressing Towards Goal  Goal: *Control of acute pain  Outcome: Progressing Towards Goal     Problem: Falls - Risk of  Goal: *Absence of Falls  Description: Document Nalini Fall Risk and appropriate interventions in the flowsheet.   Outcome: Progressing Towards Goal  Note: Fall Risk Interventions:            Medication Interventions: Patient to call before getting OOB, Teach patient to arise slowly                   Problem: Patient Education: Go to Patient Education Activity  Goal: Patient/Family Education  Outcome: Progressing Towards Goal

## 2021-10-13 NOTE — PROGRESS NOTES
Discharge plan of care/case management plan validated with provider discharge order. Discharged home to self care,Discharge instructions given and patient verbalized understanding. IV removed with no complications. Escorted per wheelchair in satisfactory condition with no complains.

## 2021-10-19 ENCOUNTER — APPOINTMENT (OUTPATIENT)
Dept: ULTRASOUND IMAGING | Age: 36
End: 2021-10-19
Attending: NURSE PRACTITIONER
Payer: MEDICAID

## 2021-10-19 ENCOUNTER — TELEPHONE (OUTPATIENT)
Dept: UROLOGY | Age: 36
End: 2021-10-19

## 2021-10-19 ENCOUNTER — HOSPITAL ENCOUNTER (EMERGENCY)
Age: 36
Discharge: ANOTHER HOSPITAL | End: 2021-10-20
Payer: MEDICAID

## 2021-10-19 VITALS
SYSTOLIC BLOOD PRESSURE: 156 MMHG | OXYGEN SATURATION: 98 % | TEMPERATURE: 98.4 F | HEIGHT: 65 IN | DIASTOLIC BLOOD PRESSURE: 94 MMHG | WEIGHT: 220 LBS | BODY MASS INDEX: 36.65 KG/M2 | RESPIRATION RATE: 18 BRPM | HEART RATE: 84 BPM

## 2021-10-19 DIAGNOSIS — D72.829 LEUKOCYTOSIS, UNSPECIFIED TYPE: ICD-10-CM

## 2021-10-19 DIAGNOSIS — N20.0 RENAL CALCULI: ICD-10-CM

## 2021-10-19 DIAGNOSIS — N17.9 AKI (ACUTE KIDNEY INJURY) (HCC): ICD-10-CM

## 2021-10-19 DIAGNOSIS — N30.01 ACUTE CYSTITIS WITH HEMATURIA: Primary | ICD-10-CM

## 2021-10-19 PROBLEM — N13.30 HYDRONEPHROSIS: Status: ACTIVE | Noted: 2021-10-19

## 2021-10-19 LAB
ALBUMIN SERPL-MCNC: 4.1 G/DL (ref 3.5–5)
ALBUMIN/GLOB SERPL: 1.1 {RATIO} (ref 1.1–2.2)
ALP SERPL-CCNC: 114 U/L (ref 45–117)
ALT SERPL-CCNC: 12 U/L (ref 12–78)
ANION GAP SERPL CALC-SCNC: 8 MMOL/L (ref 5–15)
APPEARANCE UR: ABNORMAL
AST SERPL W P-5'-P-CCNC: 12 U/L (ref 15–37)
BACTERIA URNS QL MICRO: NEGATIVE /HPF
BACTERIA URNS QL MICRO: NEGATIVE /HPF
BILIRUB SERPL-MCNC: 0.4 MG/DL (ref 0.2–1)
BILIRUB UR QL: NEGATIVE
BUN SERPL-MCNC: 19 MG/DL (ref 6–20)
BUN/CREAT SERPL: 17 (ref 12–20)
CA-I BLD-MCNC: 9.4 MG/DL (ref 8.5–10.1)
CHLORIDE SERPL-SCNC: 110 MMOL/L (ref 97–108)
CO2 SERPL-SCNC: 20 MMOL/L (ref 21–32)
COLOR UR: ABNORMAL
CREAT SERPL-MCNC: 1.12 MG/DL (ref 0.55–1.02)
ERYTHROCYTE [DISTWIDTH] IN BLOOD BY AUTOMATED COUNT: 14.3 % (ref 11.5–14.5)
GLOBULIN SER CALC-MCNC: 3.9 G/DL (ref 2–4)
GLUCOSE SERPL-MCNC: 96 MG/DL (ref 65–100)
GLUCOSE UR STRIP.AUTO-MCNC: NEGATIVE MG/DL
HCT VFR BLD AUTO: 43.6 % (ref 35–47)
HGB BLD-MCNC: 13.9 G/DL (ref 11.5–16)
HGB UR QL STRIP: ABNORMAL
KETONES UR QL STRIP.AUTO: NEGATIVE MG/DL
LEUKOCYTE ESTERASE UR QL STRIP.AUTO: ABNORMAL
MCH RBC QN AUTO: 27.7 PG (ref 26–34)
MCHC RBC AUTO-ENTMCNC: 31.9 G/DL (ref 30–36.5)
MCV RBC AUTO: 86.9 FL (ref 80–99)
MUCOUS THREADS URNS QL MICRO: ABNORMAL /LPF
MUCOUS THREADS URNS QL MICRO: ABNORMAL /LPF
NITRITE UR QL STRIP.AUTO: NEGATIVE
NRBC # BLD: 0 K/UL (ref 0–0.01)
NRBC BLD-RTO: 0 PER 100 WBC
PH UR STRIP: 5 [PH] (ref 5–8)
PLATELET # BLD AUTO: 396 K/UL (ref 150–400)
PMV BLD AUTO: 10.6 FL (ref 8.9–12.9)
POTASSIUM SERPL-SCNC: 4 MMOL/L (ref 3.5–5.1)
PROT SERPL-MCNC: 8 G/DL (ref 6.4–8.2)
PROT UR STRIP-MCNC: 100 MG/DL
RBC # BLD AUTO: 5.02 M/UL (ref 3.8–5.2)
RBC #/AREA URNS HPF: >100 /HPF (ref 0–5)
RBC #/AREA URNS HPF: >100 /HPF (ref 0–5)
SODIUM SERPL-SCNC: 138 MMOL/L (ref 136–145)
SP GR UR REFRACTOMETRY: 1.02 (ref 1–1.03)
UROBILINOGEN UR QL STRIP.AUTO: 0.1 EU/DL (ref 0.1–1)
WBC # BLD AUTO: 14.4 K/UL (ref 3.6–11)
WBC URNS QL MICRO: >100 /HPF (ref 0–4)
WBC URNS QL MICRO: >100 /HPF (ref 0–4)

## 2021-10-19 PROCEDURE — 81001 URINALYSIS AUTO W/SCOPE: CPT

## 2021-10-19 PROCEDURE — 74011000250 HC RX REV CODE- 250: Performed by: NURSE PRACTITIONER

## 2021-10-19 PROCEDURE — 74011250636 HC RX REV CODE- 250/636: Performed by: NURSE PRACTITIONER

## 2021-10-19 PROCEDURE — 36415 COLL VENOUS BLD VENIPUNCTURE: CPT

## 2021-10-19 PROCEDURE — 96376 TX/PRO/DX INJ SAME DRUG ADON: CPT

## 2021-10-19 PROCEDURE — 99284 EMERGENCY DEPT VISIT MOD MDM: CPT

## 2021-10-19 PROCEDURE — 76770 US EXAM ABDO BACK WALL COMP: CPT

## 2021-10-19 PROCEDURE — 96374 THER/PROPH/DIAG INJ IV PUSH: CPT

## 2021-10-19 PROCEDURE — 96375 TX/PRO/DX INJ NEW DRUG ADDON: CPT

## 2021-10-19 PROCEDURE — 85027 COMPLETE CBC AUTOMATED: CPT

## 2021-10-19 PROCEDURE — 80053 COMPREHEN METABOLIC PANEL: CPT

## 2021-10-19 RX ORDER — HYDROMORPHONE HYDROCHLORIDE 1 MG/ML
0.5 INJECTION, SOLUTION INTRAMUSCULAR; INTRAVENOUS; SUBCUTANEOUS ONCE
Status: COMPLETED | OUTPATIENT
Start: 2021-10-19 | End: 2021-10-19

## 2021-10-19 RX ORDER — KETOROLAC TROMETHAMINE 30 MG/ML
30 INJECTION, SOLUTION INTRAMUSCULAR; INTRAVENOUS ONCE
Status: COMPLETED | OUTPATIENT
Start: 2021-10-19 | End: 2021-10-19

## 2021-10-19 RX ORDER — ONDANSETRON 2 MG/ML
4 INJECTION INTRAMUSCULAR; INTRAVENOUS
Status: COMPLETED | OUTPATIENT
Start: 2021-10-19 | End: 2021-10-19

## 2021-10-19 RX ADMIN — HYDROMORPHONE HYDROCHLORIDE 0.5 MG: 1 INJECTION, SOLUTION INTRAMUSCULAR; INTRAVENOUS; SUBCUTANEOUS at 20:23

## 2021-10-19 RX ADMIN — KETOROLAC TROMETHAMINE 30 MG: 30 INJECTION, SOLUTION INTRAMUSCULAR at 18:21

## 2021-10-19 RX ADMIN — CEFTRIAXONE 1 G: 1 INJECTION, POWDER, FOR SOLUTION INTRAMUSCULAR; INTRAVENOUS at 20:10

## 2021-10-19 RX ADMIN — ONDANSETRON 4 MG: 2 INJECTION INTRAMUSCULAR; INTRAVENOUS at 20:22

## 2021-10-19 RX ADMIN — ONDANSETRON 4 MG: 2 INJECTION INTRAMUSCULAR; INTRAVENOUS at 18:21

## 2021-10-19 RX ADMIN — SODIUM CHLORIDE 1000 ML: 9 INJECTION, SOLUTION INTRAVENOUS at 20:10

## 2021-10-19 NOTE — ED PROVIDER NOTES
EMERGENCY DEPARTMENT HISTORY AND PHYSICAL EXAM      Date: 10/19/2021  Patient Name: Mary Acevedo    History of Presenting Illness     Chief Complaint   Patient presents with    Blood in Urine       History Provided By: Patient    HPI: Mary Acevedo, 39 y.o. female with a past medical history significant Frequent renal calculi, recent stent placement presents to the ED with cc of hematuria and increased left flank and left sided abdominal pain radiating to her bladder. She had cystoscopy with laser lithotripsy and stent placement on 10/12/2021 and had some pink-tinged urine which she was aware of as normal but starting today her urine became more bloody with clots and increased left flank and left-sided abdominal pain. She also has a feeling of bladder not emptying totally when she urinates as it has felt before when she has urinary tract infection. She denies any vaginal bleeding or discharge. She denies any fever, body aches, chills, dizziness, weakness. She notified Dr. Miles Fischer office of her symptoms and that she was coming to the emergency department. There are no other complaints, changes, or physical findings at this time. PCP: Liz Churchill MD    No current facility-administered medications on file prior to encounter. Current Outpatient Medications on File Prior to Encounter   Medication Sig Dispense Refill    phenazopyridine (PYRIDIUM) 95 mg tab Take 1 Tablet by mouth three (3) times daily as needed for Pain. 10 Tablet 0    ibuprofen (MOTRIN) 400 mg tablet Take 1 Tablet by mouth every six (6) hours as needed for Pain. (Patient not taking: Reported on 10/10/2021) 20 Tablet 0    cholecalciferol, vitamin D3, (VITAMIN D3 PO) Take  by mouth.  (Patient not taking: Reported on 2021)         Past History     Past Medical History:  Past Medical History:   Diagnosis Date    Kidney calculi        Past Surgical History:  Past Surgical History:   Procedure Laterality Date    HX  SECTION  2010    HX LITHOTRIPSY  2017    \"multiple times but 2017 was most recent\"    HX LITHOTRIPSY      HX TUBAL LIGATION  2010    HX UROLOGICAL  10/12/2021    CYSTOSCOPY, URETEROSCOPY WITH LASER LITHOTRIPSY,     HX UROLOGICAL  10/12/2021    BILATERAL RETROGRADE PYELOGRAM;     HX UROLOGICAL  10/12/2021    LEFT URETERAL STENT PLACEMENT        Family History:  History reviewed. No pertinent family history. Social History:  Social History     Tobacco Use    Smoking status: Current Every Day Smoker     Packs/day: 0.50     Years: 10.00     Pack years: 5.00    Smokeless tobacco: Current User   Substance Use Topics    Alcohol use: No     Comment: rarely    Drug use: No       Allergies: Allergies   Allergen Reactions    Ciprofloxacin Other (comments)     Thrush         Review of Systems     Review of Systems   Constitutional: Negative for chills, fatigue and fever. HENT: Negative. Negative for rhinorrhea and sore throat. Eyes: Negative. Negative for visual disturbance. Respiratory: Negative for cough, chest tightness, shortness of breath and wheezing. Cardiovascular: Negative for chest pain and palpitations. Gastrointestinal: Positive for abdominal pain and nausea. Negative for abdominal distention, blood in stool, constipation, diarrhea and vomiting. Endocrine: Negative. Genitourinary: Positive for difficulty urinating, dysuria, flank pain, hematuria and urgency. Negative for frequency, menstrual problem, pelvic pain, vaginal bleeding, vaginal discharge and vaginal pain. Musculoskeletal: Negative for arthralgias, myalgias and neck pain. Skin: Negative. Allergic/Immunologic: Negative. Neurological: Negative for dizziness, syncope, weakness and headaches. Hematological: Negative. Psychiatric/Behavioral: Negative. All other systems reviewed and are negative. Physical Exam     Physical Exam  Vitals and nursing note reviewed.    Constitutional:       Appearance: Normal appearance. She is well-developed and normal weight. HENT:      Head: Normocephalic. Nose: Nose normal.      Mouth/Throat:      Pharynx: Oropharynx is clear. Eyes:      Conjunctiva/sclera: Conjunctivae normal.   Neck:      Vascular: No JVD. Trachea: No tracheal deviation. Cardiovascular:      Rate and Rhythm: Normal rate and regular rhythm. Pulses: Normal pulses. Radial pulses are 2+ on the right side and 2+ on the left side. Heart sounds: Normal heart sounds. Pulmonary:      Effort: Pulmonary effort is normal.      Breath sounds: Normal breath sounds. Abdominal:      General: Abdomen is flat. Bowel sounds are normal. There is no distension. Palpations: Abdomen is soft. Tenderness: There is abdominal tenderness. There is left CVA tenderness. There is no right CVA tenderness. Musculoskeletal:         General: Normal range of motion. Cervical back: Normal range of motion and neck supple. Right lower leg: No tenderness. Left lower leg: No tenderness. Skin:     General: Skin is warm and dry. Capillary Refill: Capillary refill takes less than 2 seconds. Neurological:      General: No focal deficit present. Mental Status: She is alert and oriented to person, place, and time.    Psychiatric:         Mood and Affect: Mood normal.         Behavior: Behavior normal.         Lab and Diagnostic Study Results     Labs -     Recent Results (from the past 12 hour(s))   CBC W/O DIFF    Collection Time: 10/19/21  5:45 PM   Result Value Ref Range    WBC 14.4 (H) 3.6 - 11.0 K/uL    RBC 5.02 3.80 - 5.20 M/uL    HGB 13.9 11.5 - 16.0 g/dL    HCT 43.6 35.0 - 47.0 %    MCV 86.9 80.0 - 99.0 FL    MCH 27.7 26.0 - 34.0 PG    MCHC 31.9 30.0 - 36.5 g/dL    RDW 14.3 11.5 - 14.5 %    PLATELET 174 336 - 951 K/uL    MPV 10.6 8.9 - 12.9 FL    NRBC 0.0 0.0  WBC    ABSOLUTE NRBC 0.00 0.00 - 8.41 K/uL   METABOLIC PANEL, COMPREHENSIVE    Collection Time: 10/19/21 5:45 PM   Result Value Ref Range    Sodium 138 136 - 145 mmol/L    Potassium 4.0 3.5 - 5.1 mmol/L    Chloride 110 (H) 97 - 108 mmol/L    CO2 20 (L) 21 - 32 mmol/L    Anion gap 8 5 - 15 mmol/L    Glucose 96 65 - 100 mg/dL    BUN 19 6 - 20 mg/dL    Creatinine 1.12 (H) 0.55 - 1.02 mg/dL    BUN/Creatinine ratio 17 12 - 20      GFR est AA >60 >60 ml/min/1.73m2    GFR est non-AA 55 (L) >60 ml/min/1.73m2    Calcium 9.4 8.5 - 10.1 mg/dL    Bilirubin, total 0.4 0.2 - 1.0 mg/dL    AST (SGOT) 12 (L) 15 - 37 U/L    ALT (SGPT) 12 12 - 78 U/L    Alk. phosphatase 114 45 - 117 U/L    Protein, total 8.0 6.4 - 8.2 g/dL    Albumin 4.1 3.5 - 5.0 g/dL    Globulin 3.9 2.0 - 4.0 g/dL    A-G Ratio 1.1 1.1 - 2.2     URINALYSIS W/ RFLX MICROSCOPIC    Collection Time: 10/19/21  5:45 PM   Result Value Ref Range    Color Red      Appearance Turbid (A) Clear      Specific gravity 1.018 1.003 - 1.030      pH (UA) 5.0 5.0 - 8.0      Protein 100 (A) Negative mg/dL    Glucose Negative Negative mg/dL    Ketone Negative Negative mg/dL    Bilirubin Negative Negative      Blood Large (A) Negative      Urobilinogen 0.1 0.1 - 1.0 EU/dL    Nitrites Negative Negative      Leukocyte Esterase Large (A) Negative      WBC >100 (H) 0 - 4 /hpf    RBC >100 (H) 0 - 5 /hpf    Bacteria Negative Negative /hpf    Mucus Trace /lpf   URINE MICROSCOPIC    Collection Time: 10/19/21  5:45 PM   Result Value Ref Range    WBC >100 (H) 0 - 4 /hpf    RBC >100 (H) 0 - 5 /hpf    Bacteria Negative Negative /hpf    Mucus Trace (A) Negative /lpf       Radiologic Studies -   @lastxrresult@  CT Results  (Last 48 hours)    None        CXR Results  (Last 48 hours)    None            Medical Decision Making   - I am the first provider for this patient. - I reviewed the vital signs, available nursing notes, past medical history, past surgical history, family history and social history. - Initial assessment performed.  The patients presenting problems have been discussed, and they are in agreement with the care plan formulated and outlined with them. I have encouraged them to ask questions as they arise throughout their visit. Vital Signs-Reviewed the patient's vital signs. Patient Vitals for the past 12 hrs:   Temp Pulse Resp BP SpO2   10/19/21 2017 98.4 °F (36.9 °C) 84 18 (!) 156/94 98 %   10/19/21 1739 98.6 °F (37 °C) 82 16 (!) 167/80 98 %       Records Reviewed: Nursing Notes, Old Medical Records, Previous Radiology Studies and Previous Laboratory Studies    The patient presents with abdominal pain with a differential diagnosis of abdominal pain, diverticulitis, gastroenteritis, obstruction, ovarian cyst, PID, renal Colic and UTI      ED Course:     ED Course as of Oct 19 2155   Tue Oct 19, 2021   1951 Patient given results of diagnostic studies including her ultrasound as well as urinary tract infection and renal function. Spoke with patient's PCP who agreed patient likely would need admission but due to unavailability of urology here she would likely need to be transferred. Discussed with patient who is agreeable to IV fluids and antibiotic and to have transfer center contacted for possibility of transfer or acceptance with urology availability given her recent procedure, presentation and infection. Her pain has decreased to \"mild\" and her nausea has improved. [KR]   2016 Patient accepted to [facility] by hospitalist Dr. Tova Arevalo with urology available and bed availability confirmed by transfer center. Transfer center to arrange S transportation. Patient has been made aware of process and is agreeable. She reports her pain is starting to increase again and will medicate her for pain and nausea prior to her transport. [KR]   2141 Contacted transfer center and confirmed patient is still awaiting bed assignment on floor for nursing to be able to call report.   Nursing supervisor at [facility] to make assignment and transfer center will contact when bed assignment is available for nursing to call report. Patient remained stable and has not requested any further pain medication or nausea medication. [KR]      ED Course User Index  [KR] Sirena Morfin NP       Provider Notes (Medical Decision Making):     MDM  Number of Diagnoses or Management Options  Acute cystitis with hematuria: new, needed workup  DANILO (acute kidney injury) [facility]): new, needed workup  Leukocytosis, unspecified type: new, needed workup  Renal calculi: established, worsening  Diagnosis management comments: Will work-up with basic labs, urinalysis and given patient's recent radiation exposure with CTs and fluoroscopy will start with retroperitoneal ultrasound. Patient agreeable to plan of care and work-up as well as pain control with Toradol and nausea medication with Zofran. Amount and/or Complexity of Data Reviewed  Clinical lab tests: ordered and reviewed  Tests in the radiology section of CPT®: ordered and reviewed  Review and summarize past medical records: yes  Discuss the patient with other providers: yes    Risk of Complications, Morbidity, and/or Mortality  Presenting problems: moderate  Diagnostic procedures: low  Management options: low    Patient Progress  Patient progress: stable           Disposition   Disposition: [facility]red to [facility] patient verbally agreed to transfer and understand the risks involved as outlined in the EMTALA form. Diagnosis     Clinical Impression:   1. Acute cystitis with hematuria    2. DANILO (acute kidney injury) ([address]    3. Leukocytosis, unspecified type    4. Renal calculi        Attestations:    Kasia Granger NP    Please note that this dictation was completed with [employer], the computer voice recognition software. Quite often unanticipated grammatical, syntax, homophones, and other interpretive errors are inadvertently transcribed by the computer software. Please disregard these errors.   Please excuse any errors that have escaped final proofreading. Thank you.

## 2021-10-19 NOTE — TELEPHONE ENCOUNTER
PT LVM so I called her back she is on her way to the er. I let her know there is going to be bleeding with a stent placed on the 12th however if she felt it is emergent .  Call us in the am with there results

## 2021-10-20 ENCOUNTER — HOSPITAL ENCOUNTER (OUTPATIENT)
Age: 36
Setting detail: OBSERVATION
Discharge: HOME | End: 2021-10-22
Attending: FAMILY MEDICINE | Admitting: INTERNAL MEDICINE
Payer: MEDICAID

## 2021-10-20 PROBLEM — N30.01 ACUTE CYSTITIS WITH HEMATURIA: Status: ACTIVE | Noted: 2021-10-20

## 2021-10-20 PROBLEM — N12 PYELONEPHRITIS: Status: ACTIVE | Noted: 2021-10-20

## 2021-10-20 LAB
APPEARANCE UR: ABNORMAL
BACTERIA URNS QL MICRO: NEGATIVE /HPF
BILIRUB UR QL: NEGATIVE
COLOR UR: ABNORMAL
EPITH CASTS URNS QL MICRO: ABNORMAL /LPF
GLUCOSE UR STRIP.AUTO-MCNC: NEGATIVE MG/DL
HGB UR QL STRIP: ABNORMAL
KETONES UR QL STRIP.AUTO: NEGATIVE MG/DL
LEUKOCYTE ESTERASE UR QL STRIP.AUTO: ABNORMAL
NITRITE UR QL STRIP.AUTO: NEGATIVE
PH UR STRIP: 6 [PH] (ref 5–8)
PROT UR STRIP-MCNC: 100 MG/DL
RBC #/AREA URNS HPF: >100 /HPF (ref 0–5)
SP GR UR REFRACTOMETRY: 1.02 (ref 1–1.03)
UA: UC IF INDICATED,UAUC: ABNORMAL
UROBILINOGEN UR QL STRIP.AUTO: 0.2 EU/DL (ref 0.2–1)
WBC URNS QL MICRO: ABNORMAL /HPF (ref 0–4)

## 2021-10-20 PROCEDURE — 74011250637 HC RX REV CODE- 250/637: Performed by: FAMILY MEDICINE

## 2021-10-20 PROCEDURE — 74011000250 HC RX REV CODE- 250: Performed by: FAMILY MEDICINE

## 2021-10-20 PROCEDURE — 74011250637 HC RX REV CODE- 250/637: Performed by: NURSE PRACTITIONER

## 2021-10-20 PROCEDURE — 87086 URINE CULTURE/COLONY COUNT: CPT

## 2021-10-20 PROCEDURE — 74011250636 HC RX REV CODE- 250/636: Performed by: INTERNAL MEDICINE

## 2021-10-20 PROCEDURE — 96374 THER/PROPH/DIAG INJ IV PUSH: CPT

## 2021-10-20 PROCEDURE — 96376 TX/PRO/DX INJ SAME DRUG ADON: CPT

## 2021-10-20 PROCEDURE — 81001 URINALYSIS AUTO W/SCOPE: CPT

## 2021-10-20 PROCEDURE — 96375 TX/PRO/DX INJ NEW DRUG ADDON: CPT

## 2021-10-20 PROCEDURE — 74011250636 HC RX REV CODE- 250/636: Performed by: FAMILY MEDICINE

## 2021-10-20 PROCEDURE — 99218 HC RM OBSERVATION: CPT

## 2021-10-20 RX ORDER — PHENAZOPYRIDINE HYDROCHLORIDE 100 MG/1
100 TABLET, FILM COATED ORAL
Status: DISCONTINUED | OUTPATIENT
Start: 2021-10-20 | End: 2021-10-22 | Stop reason: HOSPADM

## 2021-10-20 RX ORDER — TAMSULOSIN HYDROCHLORIDE 0.4 MG/1
0.4 CAPSULE ORAL DAILY
Status: DISCONTINUED | OUTPATIENT
Start: 2021-10-20 | End: 2021-10-22 | Stop reason: HOSPADM

## 2021-10-20 RX ORDER — ACETAMINOPHEN 650 MG/1
650 SUPPOSITORY RECTAL
Status: DISCONTINUED | OUTPATIENT
Start: 2021-10-20 | End: 2021-10-22 | Stop reason: HOSPADM

## 2021-10-20 RX ORDER — ACETAMINOPHEN 325 MG/1
650 TABLET ORAL
Status: DISCONTINUED | OUTPATIENT
Start: 2021-10-20 | End: 2021-10-22 | Stop reason: HOSPADM

## 2021-10-20 RX ORDER — KETOROLAC TROMETHAMINE 30 MG/ML
15 INJECTION, SOLUTION INTRAMUSCULAR; INTRAVENOUS
Status: DISPENSED | OUTPATIENT
Start: 2021-10-20 | End: 2021-10-21

## 2021-10-20 RX ORDER — SODIUM CHLORIDE 0.9 % (FLUSH) 0.9 %
5-40 SYRINGE (ML) INJECTION EVERY 8 HOURS
Status: DISCONTINUED | OUTPATIENT
Start: 2021-10-20 | End: 2021-10-22 | Stop reason: HOSPADM

## 2021-10-20 RX ORDER — OXYBUTYNIN CHLORIDE 5 MG/5ML
5 SYRUP ORAL
Status: DISCONTINUED | OUTPATIENT
Start: 2021-10-20 | End: 2021-10-22 | Stop reason: HOSPADM

## 2021-10-20 RX ORDER — SODIUM CHLORIDE 9 MG/ML
75 INJECTION, SOLUTION INTRAVENOUS CONTINUOUS
Status: DISPENSED | OUTPATIENT
Start: 2021-10-20 | End: 2021-10-21

## 2021-10-20 RX ORDER — SODIUM CHLORIDE 0.9 % (FLUSH) 0.9 %
5-40 SYRINGE (ML) INJECTION AS NEEDED
Status: DISCONTINUED | OUTPATIENT
Start: 2021-10-20 | End: 2021-10-22 | Stop reason: HOSPADM

## 2021-10-20 RX ORDER — ONDANSETRON 2 MG/ML
4 INJECTION INTRAMUSCULAR; INTRAVENOUS
Status: DISCONTINUED | OUTPATIENT
Start: 2021-10-20 | End: 2021-10-22 | Stop reason: HOSPADM

## 2021-10-20 RX ORDER — ONDANSETRON 4 MG/1
4 TABLET, ORALLY DISINTEGRATING ORAL
Status: DISCONTINUED | OUTPATIENT
Start: 2021-10-20 | End: 2021-10-22 | Stop reason: HOSPADM

## 2021-10-20 RX ADMIN — SODIUM CHLORIDE 10 ML: 9 INJECTION INTRAMUSCULAR; INTRAVENOUS; SUBCUTANEOUS at 22:46

## 2021-10-20 RX ADMIN — ACETAMINOPHEN 650 MG: 325 TABLET ORAL at 12:14

## 2021-10-20 RX ADMIN — KETOROLAC TROMETHAMINE 15 MG: 30 INJECTION, SOLUTION INTRAMUSCULAR; INTRAVENOUS at 20:01

## 2021-10-20 RX ADMIN — WATER 1 G: 1 INJECTION INTRAMUSCULAR; INTRAVENOUS; SUBCUTANEOUS at 20:01

## 2021-10-20 RX ADMIN — SODIUM CHLORIDE 75 ML/HR: 9 INJECTION, SOLUTION INTRAVENOUS at 08:56

## 2021-10-20 RX ADMIN — ONDANSETRON 4 MG: 4 TABLET, ORALLY DISINTEGRATING ORAL at 16:14

## 2021-10-20 RX ADMIN — TAMSULOSIN HYDROCHLORIDE 0.4 MG: 0.4 CAPSULE ORAL at 12:14

## 2021-10-20 RX ADMIN — SODIUM CHLORIDE 10 ML: 9 INJECTION INTRAMUSCULAR; INTRAVENOUS; SUBCUTANEOUS at 06:00

## 2021-10-20 RX ADMIN — KETOROLAC TROMETHAMINE 15 MG: 30 INJECTION, SOLUTION INTRAMUSCULAR; INTRAVENOUS at 10:20

## 2021-10-20 RX ADMIN — PHENAZOPYRIDINE HYDROCHLORIDE 100 MG: 100 TABLET ORAL at 16:09

## 2021-10-20 NOTE — LETTER
Marcelina Bartlett 55  [address] [facility] [address]  [address]  [address]  [phone]    Work/School Note    Date: 10/19/2021    To Whom It May concern:    Sera Lagos was seen and treated today at [facility] from 10/19/21 through 10/22/21.         Sera Lagos may return to work on 10/28/21    Sincerely,          MEGAN James

## 2021-10-20 NOTE — H&P
[facility]  Hospitalist Group  History and Physical    Primary Care Provider: Kimberly Nascimento MD  Date of Service:  10/20/2021    Subjective:     Lorna Hinojosa is a 39 y.o. female with a pmhx obstructive nephrolithiasis s/p cystoscopy with laser lithotripsy, and left ureteral stent placement by Dr. Kyree Sterling.  She was feeling well until 2 days ago when she developed left sided flank pain again, followed by gross hematuria. She denies fever, or vomiting, but endorsed chills and nausea. In the ED, VSS. Labs were significant for WBC 14.4, creatinine 1.12 (b/l 0.72),  And UA with large blood and leukocyte esterase. Renal US showed nonobstructing renal calculi with no evidence of hydroureteronephrosis. In the ED, she received ceftriaxone      Review of Systems:    All other review of systems were negative except for that written in the History of Present Illness. Past Medical History:   Diagnosis Date    Kidney calculi       Past Surgical History:   Procedure Laterality Date    HX  SECTION      HX LITHOTRIPSY      \"multiple times but  was most recent\"    HX LITHOTRIPSY      HX TUBAL LIGATION      HX UROLOGICAL  10/12/2021    CYSTOSCOPY, URETEROSCOPY WITH LASER LITHOTRIPSY,     HX UROLOGICAL  10/12/2021    BILATERAL RETROGRADE PYELOGRAM;     HX UROLOGICAL  10/12/2021    LEFT URETERAL STENT PLACEMENT      Prior to Admission medications    Medication Sig Start Date End Date Taking? Authorizing Provider   phenazopyridine (PYRIDIUM) 95 mg tab Take 1 Tablet by mouth three (3) times daily as needed for Pain. 10/13/21   Vinh Madrid MD   ibuprofen (MOTRIN) 400 mg tablet Take 1 Tablet by mouth every six (6) hours as needed for Pain. Patient not taking: Reported on 10/10/2021 8/23/21   Jen Christiansen MD   cholecalciferol, vitamin D3, (VITAMIN D3 PO) Take  by mouth.   Patient not taking: Reported on 2021    Provider, Historical     Allergies   Allergen Reactions    Ciprofloxacin Other (comments)     Thrush      Family hx not reiveiwd    SOCIAL HISTORY:  Patient resides at Home. Patient ambulates independently  Smoking history: noone  Alcohol history: none    Objective:       Physical Exam:   Visit Vitals  /64   Pulse 65   Temp 98 °F (36.7 °C)   Resp 16   Ht 5' 5\" (1.651 m)   Wt 98.9 kg (218 lb 0.6 oz)   SpO2 100%   BMI 36.28 kg/m²     General:  Alert, cooperative, no distress, appears stated age. Head:  Normocephalic, without obvious abnormality, atraumatic. Eyes:  Conjunctivae/corneas clear. EOMs intact. Nose: Nares normal. Septum midline. Throat: Lips, mucosa, and tongue normal.    Neck: Supple, symmetrical, trachea midline   Back:   Symmetric, no curvature. ROM normal.    Lungs:   Clear to auscultation bilaterally. Chest wall:  No tenderness or deformity. Heart:  Regular rate and rhythm, S1, S2 normal, no murmur, click, rub or gallop. Abdomen:   Soft, non-tender. Bowel sounds normal. No masses,  No organomegaly. Suprapubic tenderness           Extremities: Extremities normal, atraumatic, no cyanosis or edema. Pulses: 2+ and symmetric all extremities. Skin: Skin color, texture, turgor normal. No rashes or lesions. Data Review: All diagnostic labs and studies have been reviewed.       Assessment:     Active Problems:    Acute cystitis with hematuria (10/20/2021)        Plan:     #Acute Cystitis  #Nephrolithiasis  #s/p L ureteral stent  -NPO, IVF  -continue rocephin  -follow urine culture  -consult urology (Dr. Nicol Mack)    FEN: NPO  dvt ppx: SCD  Code: Full    FUNCTIONAL STATUS PRIOR TO HOSPITALIZATION Ambulates Independently     Signed By: John Woodruff MD     October 20, 2021

## 2021-10-20 NOTE — CONSULTS
Requesting Provider: Hanna Vázquez MD - Reason for Consultation: \"acute cystitis with stent\"  Pre-existing [facility] Patient:   No                Patient: Nino Norris MRN: [id number]  SSN: [id number]    YOB: 1985  Age: 39 y.o. Sex: female     Location: 0/26       Code Status: Full Code   PCP: Lucien Luciano MD  - [phone]   Emergency Contact:  Primary Emergency Contact: Velia Churchill, Micah Phone: [phone]   Race/Holiness/Language: Adela Estrella / Lukas Oneill / Rosendo Win: Payor: Dimitry Lewisnarendra / Plan: [id number] / Product Type: Managed Care Medicaid /    Prior Admission Data: 10/20/21 [facility] EMERGENCY DEPT     Hospitalized:  Hospital Day: 1 - Admitted 10/20/2021  3:19 AM     CONSULTANTS  IP CONSULT TO UROLOGY   ADMISSION DIAGNOSES  No diagnosis found. Assessment/Plan:       · Left pyelonephritis  · Gross Hematuria  · Stent colic   · DANILO    - No obstruction present on imaging. Okay to eat from Urology standpoint. Pain likely 2/2 acute infection. Continue broad spectrum abx and follow culture, then culture specific therapy for a full course. - Encouraged liberal intake of PO fluids. Start Flomax, pyridium, and oxybutynin for stent colic and dysuria.   - Monitor kidney function. Continue hydration with IVF and daily BMP.   - Maintain follow up with Dr. Yamilex Dumont on 10/27 for stent removal as scheduled. Urology will sign off. Please call for further questions. Supervising MD, Dr. Dale Oswald     CC: No chief complaint on file. HPI: She is a 39 y.o. female who Urology has been requested to see in consultation for acute cystitis s/p ureteral stent. She was formerly followed by Dr. Chasidy Kaur at [facility] for a history of kidney stones w/ prior ESWL. Last seen in 2018. She presented to the ED with cc of left flank pain.  She recently underwent cystoscopy with laser lithotripsy and stent placement on 10/12/2021 by Dr. Edwin Knight for a 9 x 13 mm stone in the mid left ureter. Dr. Max Solares office was notified of her admission, however, they do not have coverage at [facility]; therefore, she is being seen by [facility]. She reports feeling well initially following her procedure followed by the onset of left flank pain 2 days prior to admission. The pain is located at her left flank and radiates to her LLQ. Described initially as sharp and stabbing and now dull and constat. Associated chills, nausea, dysuria, and hematuria. She is voiding independently with yellow UA with streaks of bright red blood. She also describes intermittent pain with urination that resolves after emptying her bladder. In the ED, VSS. Labs were significant for WBC 14.4, Hgb 13.9, creatinine 1.12 (b/l 0.72), and UA 10/19 with large blood, large leuks, >100 WBC, >100 RBCs. Renal US showed nonobstructing renal calculi with no evidence of hydroureteronephrosis. She has been started on rocephin empirically. Urine culture pending. Temp (24hrs), Av.2 °F (36.8 °C), Min:97.9 °F (36.6 °C), Max:98.6 °F (37 °C)    Urinary Status: Voiding  Creatinine   Date/Time Value Ref Range Status   10/19/2021 05:45 PM 1.12 (H) 0.55 - 1.02 mg/dL Final   10/12/2021 08:14 AM 0.72 0.55 - 1.02 mg/dL Final   10/11/2021 09:24 AM 0.72 0.55 - 1.02 mg/dL Final   10/10/2021 06:30 AM 0.88 0.55 - 1.02 mg/dL Final   2020 10:50 PM 1.16 (H) 0.55 - 1.02 mg/dL Final     Current Antimicrobial Therapy (168h ago, onward)     Ordered     Start Stop    10/20/21 0443  cefTRIAXone (ROCEPHIN) 1 g in sterile water (preservative free) 10 mL IV syringe  1 g,   IntraVENous,   EVERY 24 HOURS      10/20/21 2000 10/25/21 1959              Key Anti-Platelet Anticoagulant Meds     The patient is on no antiplatelet meds or anticoagulants.         Diet: DIET NPO -       Labs     Lab Results   Component Value Date/Time    WBC 14.4 (H) 10/19/2021 05:45 PM    HCT 43.6 10/19/2021 05:45 PM    PLATELET 513  05:45 PM    Sodium 138 10/19/2021 05:45 PM    Potassium 4.0 10/19/2021 05:45 PM    Chloride 110 (H) 10/19/2021 05:45 PM    CO2 20 (L) 10/19/2021 05:45 PM    BUN 19 10/19/2021 05:45 PM    Creatinine 1.12 (H) 10/19/2021 05:45 PM    Glucose 96 10/19/2021 05:45 PM    Calcium 9.4 10/19/2021 05:45 PM    INR 1.1 10/10/2021 07:45 AM     UA:   Lab Results   Component Value Date/Time    Color RED 10/20/2021 07:58 AM    Appearance CLOUDY (A) 10/20/2021 07:58 AM    Specific gravity 1.025 10/20/2021 07:58 AM    Specific gravity 1.020 06/29/2018 09:48 PM    pH (UA) 6.0 10/20/2021 07:58 AM    Protein 100 (A) 10/20/2021 07:58 AM    Glucose Negative 10/20/2021 07:58 AM    Ketone Negative 10/20/2021 07:58 AM    Bilirubin Negative 10/20/2021 07:58 AM    Urobilinogen 0.2 10/20/2021 07:58 AM    Nitrites Negative 10/20/2021 07:58 AM    Leukocyte Esterase MODERATE (A) 10/20/2021 07:58 AM    Epithelial cells FEW 10/20/2021 07:58 AM    Bacteria Negative 10/20/2021 07:58 AM    WBC 5-10 10/20/2021 07:58 AM    RBC >100 (H) 10/20/2021 07:58 AM     Imaging     Results for orders placed during the hospital encounter of 10/10/21    CT ABD PELV WO CONT    Narrative  CT abdomen and pelvis without contrast:    Comparison 12/19/2020    Dose reduction: All CT scans at this facility are performed using dose reduction  optimization techniques as appropriate to a performed exam including the  following: Automated exposure control, adjustments of the mA and/or kV according  to patient size, or use of iterative reconstruction technique. An emergency noncontrast axial study was performed with coronal and sagittal  reconstructions. A 3 mm noncalcified left lower lobe nodule is unchanged, series 204, image 13. There are no acute abnormalities in the liver, spleen or pancreas. The  gallbladder is incompletely distended without stones calcified stones. There is no adrenal mass bilaterally. There are calcified renal stones bilaterally.  There is moderate obstruction of  the left kidney and left ureter secondary to a 9 x 13 mm calcified stone  positioned in the mid left ureter (L3 level). The urinary bladder is smooth in contour. An IUD is in place. There are bilateral tubal ligation clips. The uterus is not  enlarged. The aorta is not aneurysmal.    There is no bowel distention. The appendix is normal.    There is no free air or free fluid in the abdomen or pelvis. There is disc osteophyte complex formation at L2-3. Impression  Left urinary tract obstruction as above. US Results (most recent):  Results from [place] encounter on 10/19/21    US RETROPERITONEUM COMP    Narrative  Findings: Upper IVC appears patent. Imaged portions of abdominal aorta show no  focal aneurysm. Right kidney 11.4 cm and length, without hydronephrosis. Left  kidney 12.7 cm, also without hydronephrosis. Bilateral nonobstructing echogenic  renal calculi, larger on the left. Partially distended urinary bladder appears  unremarkable as visualized. Impression  Nonobstructing renal calculi. No CT findings of a  hydroureteronephrosis.       Cultures     All Micro Results     None           Past History: (Complete 2+/3 areas)     Allergies   Allergen Reactions    Ciprofloxacin Other (comments)     Thrush      Current Facility-Administered Medications   Medication Dose Route Frequency    sodium chloride (NS) flush 5-40 mL  5-40 mL IntraVENous Q8H    sodium chloride (NS) flush 5-40 mL  5-40 mL IntraVENous PRN    acetaminophen (TYLENOL) tablet 650 mg  650 mg Oral Q6H PRN    Or    acetaminophen (TYLENOL) suppository 650 mg  650 mg Rectal Q6H PRN    ondansetron (ZOFRAN ODT) tablet 4 mg  4 mg Oral Q8H PRN    Or    ondansetron (ZOFRAN) injection 4 mg  4 mg IntraVENous Q6H PRN    cefTRIAXone (ROCEPHIN) 1 g in sterile water (preservative free) 10 mL IV syringe  1 g IntraVENous Q24H    0.9% sodium chloride infusion  75 mL/hr IntraVENous CONTINUOUS    Prior to Admission medications    Medication Sig Start Date End Date Taking? Authorizing Provider   phenazopyridine (PYRIDIUM) 95 mg tab Take 1 Tablet by mouth three (3) times daily as needed for Pain. 10/13/21   Belinda Madrid MD   ibuprofen (MOTRIN) 400 mg tablet Take 1 Tablet by mouth every six (6) hours as needed for Pain. Patient not taking: Reported on 10/10/2021 8/23/21   Jessica Caicedo MD   cholecalciferol, vitamin D3, (VITAMIN D3 PO) Take  by mouth. Patient not taking: Reported on 2021    Provider, Historical        PMHx:  has a past medical history of Kidney calculi. PSurgHx:  has a past surgical history that includes hx  section (); hx tubal ligation (); hx lithotripsy (); hx lithotripsy; hx urological (10/12/2021); hx urological (10/12/2021); and hx urological (10/12/2021). PSocHx:  reports that she has been smoking. She has a 5.00 pack-year smoking history. She uses smokeless tobacco. She reports that she does not drink alcohol and does not use drugs.    ROS:  (Complete - 10 systems) - DENIES: Weightloss (Constitutional), Dry mouth (ENMT), Chest pain (CV), SOB (Respiratory), Constipation (GI), Weakness (MS), Pallor (Skin), TIA Sx (Neuro), Confusion (Psych), Easy bruising (Heme)    Physical Exam: (Comprehesive - 8+ 1995 Systems)     (1) Constitutional:  NAD; pleasant  FIO2:   on SpO2: O2 Sat (%): 98 %       Patient Vitals for the past 24 hrs:   BP Temp Pulse Resp SpO2 Height Weight   10/20/21 0725 121/80 97.9 °F (36.6 °C) 74 16 98 % -- --   10/20/21 0248 134/64 98 °F (36.7 °C) 65 16 100 % 5' 5\" (1.651 m) 98.9 kg (218 lb 0.6 oz)       Date 10/19/21 07 - 10/20/21 0659(Not Admitted) 10/20/21 07 - 10/21/21 0659   Shift 7120-1173 9271-2925 24 Hour Total 6390-1934 4935-6415 24 Hour Total   INTAKE   Shift Total(mL/kg)         OUTPUT   Urine    250  250     Urine Voided    250  250   Shift Total(mL/kg)    250(2.5)  250(2.5)   NET    -250  -250   Weight (kg)  98.9 98.9 98.9 98.9 98.9      (2) ENMT:   moist mucous membranes, normal sinuses   (3) Respiratory:  breathing easily, no distress   (4) GI:  no abdominal masses, LLQ tenderness   (5) :   Voiding independently, yellow UA with streaks of bright red blood, no clots, mild left CVA tenderness   (6) Lymphatic:  no adenopathy, neck supple   (7) Muscloskeletal:  no gross deformity, normal ROM   (8) Skin:  no rash, warm & dry   (9) Neuro:  Alert, no focal deficits, normal speech      Signed By: Joshua Thacker NP  - October 20, 2021

## 2021-10-20 NOTE — PROGRESS NOTES
RUR: 12%    IMTIAZ: Anticipated discharge home. Patient's boyfriend or sister to provide transport home once medically stable. Follow-up with PCP/specialist.     Primary Contact: Carlos Brown, [phone]    Care Management Interventions  PCP Verified by CM: Yes  Mode of Transport at Discharge: Self (Dr. Gauri Salmon, last seen 1 week ago )  Discharge Durable Medical Equipment: No  Physical Therapy Consult: No  Occupational Therapy Consult: No  Speech Therapy Consult: No  Support Systems: Spouse/Significant Other, Other Family Member(s)  Confirm Follow Up Transport: Family  The Plan for Transition of Care is Related to the Following Treatment Goals : Home home  Discharge Location  Discharge Placement: Home    Readmission Assessment  Number of days since last admission?: 8-30 days  Previous disposition: Home with Family  Who is being interviewed?: Patient  Did you visit your Primary Care Physician after you left the hospital, before you returned this time?: Yes  Does the patient report anything that got in the way of taking their medications?: No    Reason for Admission:  Acute cystitis w/ hematuria                   RUR Score:  12%                   Plan for utilizing home health:   No HH needs indicated at this time. PCP: First and Last name:  Sharon Salinas MD     Name of Practice:    Are you a current patient: Yes/No: Yes   Approximate date of last visit: 1 week ago   Can you participate in a virtual visit with your PCP: Yes                    Current Advanced Directive/Advance Care Plan: Full Code    Healthcare Decision Maker:   Click here to complete [address] including selection of the Healthcare Decision Maker Relationship (ie \"Primary\")             Primary Decision Maker: Tom Brown - [phone]                  Transition of Care Plan:  Home     CM met with patient at bedside to introduce self and explain role.  Patient lives with her boyfriend in a 1 story home with 5 steps to enter. Patient was independent at baseline with ADL's, IADL's, and ambulation. Patient does not own any DME. Patient's boyfriend or sister to provide transport home once medically stable. CM verified patient's demographics, PCP, and insurance Preferred pharmacy is [facility] [address] in [place] with no barriers obtaining prescriptions. CM to continue to follow for transitions of care.     Manasa Turk, AMOS   [phone]

## 2021-10-20 NOTE — ROUTINE PROCESS
Bedside shift change report given to Pam (oncoming nurse) by Herminia (offgoing nurse). Report included the following information Kardex, ED Summary, Intake/Output, MAR and Recent Results.
Informed the Dr. Adarsh Gong via phone at 03:15 a.m. that pt.  Is present at [facility] in  room # 872-1
How Severe Are Your Spot(S)?: mild
Have Your Spot(S) Been Treated In The Past?: has not been treated
Hpi Title: Evaluation of Skin Lesions
Additional History: TBSE
Family Member: Brother
Location: MMIS R anterior shoulder

## 2021-10-20 NOTE — PROGRESS NOTES
Patient admitted earlier this AM by night team. Refer to H&P. Pt seen and examined. Cont Rocephin for now. Await cultures. Appreciate urology recs. Cont tx for pyelo. Left flank pain slightly improving. Maintain follow up with urology on 10/27 for stent removal as scheduled. Renal US Nonobstructing renal calculi, No hydroureteronephrosis. Poss dc home tomorrow.     Buckner Goodell, MD

## 2021-10-20 NOTE — PROGRESS NOTES
Physician Progress Note      Citlali Gonzales  CSN #:                  [id number]  :                       1985  ADMIT DATE:       10/20/2021 3:19 AM  DISCH DATE:  RESPONDING  PROVIDER #:        Orlando Crane MD          QUERY TEXT:    Dear attending,    Pt admitted with pyelonephritis. Pt noted to have documentation of a  ureteral stent placement on 10/12/21. If possible, please document in the progress notes and discharge summary if you are evaluating and/or treating any of the following: The medical record reflects the following:  Risk Factors: s/p ureteral stent on 10/12/21  Clinical Indicators: Per H&P- Acute Cystitis  Nephrolithiasis  s/p L ureteral stent  10/20-Per urology- She is a 39 y.o. female who Urology has been requested to see in consultation for acute cystitis s/p ureteral stent. . She recently underwent cystoscopy with laser lithotripsy and stent placement on 10/12/2021 by Dr. Rodrigo Mueller for a 9 x 13 mm stone in the mid left ureter. She reports feeling well initially following her procedure followed by the onset of left flank pain 2 days prior to admission. The pain is located at her left flank and radiates to her LLQ. Described initially as sharp and stabbing and now dull and constat. Associated chills, nausea, dysuria, and hematuria. She is voiding independently with yellow UA with streaks of bright red blood. She also describes intermittent pain with urination that resolves after emptying her bladder. In the ED, VSS. Labs were significant for WBC 14.4, Hgb 13.9, creatinine 1.12 (b/l 0.72), and UA 10/19 with large blood, large leuks, >100 WBC, >100 RBCs. Renal US showed nonobstructing renal calculi with no evidence of hydroureteronephrosis.   Treatment: IV Rocephin 1 g q24 hours, monitor labwork , vital signs, imaging, urology consult      Thank you,  Helen Baeza RN, BSN  Clinical documentation Improvement  [phone]  Options provided:  -- Left pyelonephritis due to ureteral stent  -- Left pyelonephritis was not due to ureteral stent  -- Other - I will add my own diagnosis  -- Disagree - Not applicable / Not valid  -- Disagree - Clinically unable to determine / Unknown  -- Refer to Clinical Documentation Reviewer    PROVIDER RESPONSE TEXT:    Left pyelonephritis not due to ureteral stent.     Query created by: Ana Martin on 10/20/2021 2:36 PM      Electronically signed by:  Roylene Hamman MD 10/20/2021 6:12 PM

## 2021-10-20 NOTE — CONSULTS
Urology consult received. Patient is followed by Dr. Duc Spencer.   Beverly asked to call his office. [facility] available if needed.

## 2021-10-21 LAB
ANION GAP SERPL CALC-SCNC: 3 MMOL/L (ref 5–15)
BACTERIA SPEC CULT: NORMAL
BASOPHILS # BLD: 0.1 K/UL (ref 0–0.1)
BASOPHILS NFR BLD: 1 % (ref 0–1)
BUN SERPL-MCNC: 15 MG/DL (ref 6–20)
BUN/CREAT SERPL: 17 (ref 12–20)
CALCIUM SERPL-MCNC: 8.5 MG/DL (ref 8.5–10.1)
CHLORIDE SERPL-SCNC: 115 MMOL/L (ref 97–108)
CO2 SERPL-SCNC: 21 MMOL/L (ref 21–32)
CREAT SERPL-MCNC: 0.89 MG/DL (ref 0.55–1.02)
DIFFERENTIAL METHOD BLD: ABNORMAL
EOSINOPHIL # BLD: 0.4 K/UL (ref 0–0.4)
EOSINOPHIL NFR BLD: 4 % (ref 0–7)
ERYTHROCYTE [DISTWIDTH] IN BLOOD BY AUTOMATED COUNT: 14.1 % (ref 11.5–14.5)
GLUCOSE SERPL-MCNC: 108 MG/DL (ref 65–100)
HCT VFR BLD AUTO: 35.1 % (ref 35–47)
HGB BLD-MCNC: 11.3 G/DL (ref 11.5–16)
IMM GRANULOCYTES # BLD AUTO: 0 K/UL (ref 0–0.04)
IMM GRANULOCYTES NFR BLD AUTO: 0 % (ref 0–0.5)
LYMPHOCYTES # BLD: 2.4 K/UL (ref 0.8–3.5)
LYMPHOCYTES NFR BLD: 26 % (ref 12–49)
MCH RBC QN AUTO: 27.6 PG (ref 26–34)
MCHC RBC AUTO-ENTMCNC: 32.2 G/DL (ref 30–36.5)
MCV RBC AUTO: 85.6 FL (ref 80–99)
MONOCYTES # BLD: 0.5 K/UL (ref 0–1)
MONOCYTES NFR BLD: 6 % (ref 5–13)
NEUTS SEG # BLD: 5.9 K/UL (ref 1.8–8)
NEUTS SEG NFR BLD: 63 % (ref 32–75)
NRBC # BLD: 0 K/UL (ref 0–0.01)
NRBC BLD-RTO: 0 PER 100 WBC
PLATELET # BLD AUTO: 307 K/UL (ref 150–400)
PMV BLD AUTO: 10.7 FL (ref 8.9–12.9)
POTASSIUM SERPL-SCNC: 3.7 MMOL/L (ref 3.5–5.1)
RBC # BLD AUTO: 4.1 M/UL (ref 3.8–5.2)
SERVICE CMNT-IMP: NORMAL
SODIUM SERPL-SCNC: 139 MMOL/L (ref 136–145)
WBC # BLD AUTO: 9.3 K/UL (ref 3.6–11)

## 2021-10-21 PROCEDURE — 74011250637 HC RX REV CODE- 250/637: Performed by: FAMILY MEDICINE

## 2021-10-21 PROCEDURE — 80048 BASIC METABOLIC PNL TOTAL CA: CPT

## 2021-10-21 PROCEDURE — 74011000250 HC RX REV CODE- 250: Performed by: FAMILY MEDICINE

## 2021-10-21 PROCEDURE — 74011250636 HC RX REV CODE- 250/636: Performed by: INTERNAL MEDICINE

## 2021-10-21 PROCEDURE — 85025 COMPLETE CBC W/AUTO DIFF WBC: CPT

## 2021-10-21 PROCEDURE — 74011250637 HC RX REV CODE- 250/637: Performed by: NURSE PRACTITIONER

## 2021-10-21 PROCEDURE — 99218 HC RM OBSERVATION: CPT

## 2021-10-21 PROCEDURE — 96376 TX/PRO/DX INJ SAME DRUG ADON: CPT

## 2021-10-21 PROCEDURE — 74011250636 HC RX REV CODE- 250/636: Performed by: FAMILY MEDICINE

## 2021-10-21 PROCEDURE — 36415 COLL VENOUS BLD VENIPUNCTURE: CPT

## 2021-10-21 RX ADMIN — PHENAZOPYRIDINE HYDROCHLORIDE 100 MG: 100 TABLET ORAL at 09:46

## 2021-10-21 RX ADMIN — SODIUM CHLORIDE 5 ML: 9 INJECTION INTRAMUSCULAR; INTRAVENOUS; SUBCUTANEOUS at 14:00

## 2021-10-21 RX ADMIN — TAMSULOSIN HYDROCHLORIDE 0.4 MG: 0.4 CAPSULE ORAL at 09:46

## 2021-10-21 RX ADMIN — ACETAMINOPHEN 650 MG: 325 TABLET ORAL at 09:46

## 2021-10-21 RX ADMIN — KETOROLAC TROMETHAMINE 15 MG: 30 INJECTION, SOLUTION INTRAMUSCULAR; INTRAVENOUS at 02:01

## 2021-10-21 RX ADMIN — PHENAZOPYRIDINE HYDROCHLORIDE 100 MG: 100 TABLET ORAL at 21:47

## 2021-10-21 RX ADMIN — WATER 1 G: 1 INJECTION INTRAMUSCULAR; INTRAVENOUS; SUBCUTANEOUS at 21:37

## 2021-10-21 RX ADMIN — OXYBUTYNIN CHLORIDE 5 MG: 5 SYRUP ORAL at 21:38

## 2021-10-21 RX ADMIN — SODIUM CHLORIDE 10 ML: 9 INJECTION INTRAMUSCULAR; INTRAVENOUS; SUBCUTANEOUS at 21:37

## 2021-10-21 RX ADMIN — SODIUM CHLORIDE 10 ML: 9 INJECTION INTRAMUSCULAR; INTRAVENOUS; SUBCUTANEOUS at 06:00

## 2021-10-21 NOTE — PROGRESS NOTES
[facility] Adult  Hospitalist Group                                                                                          Hospitalist Progress Note  Edin Owens [address]  Answering service: [phone] OR [phone] from in house phone        Date of Service:  10/21/2021  NAME:  Nino Norris  :  1985  MRN:  [id number]      Admission Summary:   Per HP \" Nino Norris is a 39 y.o. female with a pmhx obstructive nephrolithiasis s/p cystoscopy with laser lithotripsy, and left ureteral stent placement by Dr. Yamilex Dumont.  She was feeling well until 2 days ago when she developed left sided flank pain again, followed by gross hematuria. She denies fever, or vomiting, but endorsed chills and nausea. \"  Labs were significant for WBC 14.4, creatinine 1.12 (b/l 0.72),  And UA with large blood and leukocyte esterase. Renal US showed nonobstructing renal calculi with no evidence of hydroureteronephrosis.         Interval history / Subjective:    No acute distress noted. Patient has no c/o. Will likely DC home in am.     Assessment & Plan:     # L pyelonephritis  # s/p L ureteral stent     - Continue ABX     - Continue IVF     - Urine culture negative for growth     - Urology evaluated. No obstruction noted on imaging. Code status: Full  DVT prophylaxis: SCDs    Care Plan discussed with: Patient/Family  Anticipated Disposition: Home w/Family  Anticipated Discharge: Less than 24 hours     Hospital Problems  Never Reviewed        Codes Class Noted POA    Acute cystitis with hematuria ICD-10-CM: N30.01  ICD-9-CM: 595.0  10/20/2021 Unknown        Pyelonephritis ICD-10-CM: N12  ICD-9-CM: 590.80  10/20/2021 Unknown                Review of Systems:   Pertinent items are noted in HPI. Vital Signs:    Last 24hrs VS reviewed since prior progress note.  Most recent are:  Visit Vitals  /77   Pulse 86   Temp 98.2 °F (36.8 °C)   Resp 18   Ht 5' 5\" (1.651 m)   Wt 98.9 kg (218 lb 0.6 oz)   SpO2 98%   BMI 36.28 kg/m²       No intake or output data in the 24 hours ending 10/21/21 1800     Physical Examination:     I had a face to face encounter with this patient and independently examined them on 10/21/2021 as outlined below:          Constitutional:  No acute distress, cooperative, pleasant    ENT:  Oral mucosa moist, oropharynx benign. Resp:  CTA bilaterally. No wheezing/rhonchi/rales. No accessory muscle use   CV:  Regular rhythm, normal rate    GI:  Soft, non distended, non tender. normoactive bowel sounds    Musculoskeletal:  No edema, warm, 2+ pulses throughout    Neurologic:  Moves all extremities. AAOx3, CN II-XII reviewed            Data Review:    Review and/or order of clinical lab test  Review and/or order of tests in the radiology section of CPT  Review and/or order of tests in the medicine section of CPT      Labs:     Recent Labs     10/21/21  0150 10/19/21  1745   WBC 9.3 14.4*   HGB 11.3* 13.9   HCT 35.1 43.6    396     Recent Labs     10/21/21  0150 10/19/21  1745    138   K 3.7 4.0   * 110*   CO2 21 20*   BUN 15 19   CREA 0.89 1.12*   * 96   CA 8.5 9.4     Recent Labs     10/19/21  1745   ALT 12      TBILI 0.4   TP 8.0   ALB 4.1   GLOB 3.9     No results for input(s): INR, PTP, APTT, INREXT in the last 72 hours. No results for input(s): FE, TIBC, PSAT, FERR in the last 72 hours. No results found for: FOL, RBCF   No results for input(s): PH, PCO2, PO2 in the last 72 hours. No results for input(s): CPK, CKNDX, TROIQ in the last 72 hours.     No lab exists for component: CPKMB  No results found for: CHOL, CHOLX, CHLST, CHOLV, HDL, HDLP, LDL, LDLC, DLDLP, TGLX, TRIGL, TRIGP, CHHD, CHHDX  No results found for: [facility]  Lab Results   Component Value Date/Time    Color RED 10/20/2021 07:58 AM    Appearance CLOUDY (A) 10/20/2021 07:58 AM    Specific gravity 1.025 10/20/2021 07:58 AM    Specific gravity 1.020 06/29/2018 09:48 PM    pH (UA) 6.0 10/20/2021 07:58 AM    Protein 100 (A) 10/20/2021 07:58 AM    Glucose Negative 10/20/2021 07:58 AM    Ketone Negative 10/20/2021 07:58 AM    Bilirubin Negative 10/20/2021 07:58 AM    Urobilinogen 0.2 10/20/2021 07:58 AM    Nitrites Negative 10/20/2021 07:58 AM    Leukocyte Esterase MODERATE (A) 10/20/2021 07:58 AM    Epithelial cells FEW 10/20/2021 07:58 AM    Bacteria Negative 10/20/2021 07:58 AM    WBC 5-10 10/20/2021 07:58 AM    RBC >100 (H) 10/20/2021 07:58 AM         Medications Reviewed:     Current Facility-Administered Medications   Medication Dose Route Frequency    sodium chloride (NS) flush 5-40 mL  5-40 mL IntraVENous Q8H    sodium chloride (NS) flush 5-40 mL  5-40 mL IntraVENous PRN    acetaminophen (TYLENOL) tablet 650 mg  650 mg Oral Q6H PRN    Or    acetaminophen (TYLENOL) suppository 650 mg  650 mg Rectal Q6H PRN    ondansetron (ZOFRAN ODT) tablet 4 mg  4 mg Oral Q8H PRN    Or    ondansetron (ZOFRAN) injection 4 mg  4 mg IntraVENous Q6H PRN    cefTRIAXone (ROCEPHIN) 1 g in sterile water (preservative free) 10 mL IV syringe  1 g IntraVENous Q24H    tamsulosin (FLOMAX) capsule 0.4 mg  0.4 mg Oral DAILY    phenazopyridine (PYRIDIUM) tablet 100 mg  100 mg Oral TID PRN    oxybutynin (DITROPAN) 5 mg/5 mL oral syrup 5 mg  5 mg Oral TID PRN     ______________________________________________________________________  EXPECTED LENGTH OF STAY: 2d 21h  ACTUAL LENGTH OF STAY:          [address] [place] [address],[address]

## 2021-10-21 NOTE — PROGRESS NOTES
Bedside shift change report given to Billy RN (oncoming nurse) by Kade Pro RN (offgoing nurse). Report included the following information SBAR, Kardex, Intake/Output, MAR and Recent Results.

## 2021-10-22 VITALS
HEART RATE: 63 BPM | RESPIRATION RATE: 18 BRPM | WEIGHT: 218.03 LBS | HEIGHT: 65 IN | DIASTOLIC BLOOD PRESSURE: 69 MMHG | TEMPERATURE: 97.9 F | SYSTOLIC BLOOD PRESSURE: 146 MMHG | BODY MASS INDEX: 36.33 KG/M2 | OXYGEN SATURATION: 98 %

## 2021-10-22 PROCEDURE — 74011250637 HC RX REV CODE- 250/637: Performed by: NURSE PRACTITIONER

## 2021-10-22 PROCEDURE — 99218 HC RM OBSERVATION: CPT

## 2021-10-22 RX ORDER — TAMSULOSIN HYDROCHLORIDE 0.4 MG/1
0.4 CAPSULE ORAL DAILY
Qty: 30 CAPSULE | Refills: 0 | Status: SHIPPED | OUTPATIENT
Start: 2021-10-23 | End: 2022-05-09

## 2021-10-22 RX ORDER — OXYBUTYNIN CHLORIDE 5 MG/1
5 TABLET ORAL 3 TIMES DAILY
Qty: 90 TABLET | Refills: 0 | Status: SHIPPED | OUTPATIENT
Start: 2021-10-22 | End: 2022-05-09

## 2021-10-22 RX ADMIN — SODIUM CHLORIDE 10 ML: 9 INJECTION INTRAMUSCULAR; INTRAVENOUS; SUBCUTANEOUS at 06:00

## 2021-10-22 RX ADMIN — TAMSULOSIN HYDROCHLORIDE 0.4 MG: 0.4 CAPSULE ORAL at 09:51

## 2021-10-22 NOTE — PROGRESS NOTES
x2 RN's stuck pt x4 times for AM labs. Each time blood return appeared in tubing but failed to continue flowing.

## 2021-10-22 NOTE — PROGRESS NOTES
Hospital follow-up PCP transitional care appointment has been scheduled with Dr. Bo Ryan for Friday, 10/29/21 at 1:30 p.m. Pending patient discharge.   Archie Rabago, Care Management Specialist.

## 2021-10-22 NOTE — DISCHARGE INSTRUCTIONS
Discharge Instructions       PATIENT ID: Lorna Hinojosa  MRN: [id number]   YOB: 1985    DATE OF ADMISSION: 10/20/2021  3:19 AM    DATE OF DISCHARGE: 10/22/2021    PRIMARY CARE PROVIDER: Kimberyl Nascimento MD     ATTENDING PHYSICIAN: Joce Estrella*  DISCHARGING PROVIDER: MEGAN Rivera    To contact this individual call [phone] and ask the  to page. If unavailable ask to be transferred the Adult Hospitalist Department. DISCHARGE DIAGNOSES Nephrolithiasis s/p L ureteral stent      CONSULTATIONS: IP CONSULT TO UROLOGY    PROCEDURES/SURGERIES: * No surgery found *    PENDING TEST RESULTS:   At the time of discharge the following test results are still pending: None    FOLLOW UP APPOINTMENTS:   Follow-up Information     Follow up With Specialties Details Why Ashleigh Phillips MD [address] [address]  [place] [address] Urology  In 1 week  [address] [place] [address] Dr Sim [address] [place] [address]:     Please make sure to follow up with your primary care physician within 1-2 weeks of discharge for hospital follow up. You should also follow up with your urologist.   - Please make sure to continue to monitor for: Chest pain, shortness of breath, high fevers, or sudden weakness or numbness and return to the Emergency Department with any of these symptoms.   - Avoid tobacco, alcohol and other illicit drug use. - Diet restrictions: None   - Activity restrictions: none   - If your symptoms return/worsen seek medical attention immediately. - Take your medications exactly as outlined in your discharge paperwork. Do not take any other medications unless specifically prescribed after your hospital stay.   - Take all discharge paperwork with you to all of your follow up doctor appointments.          DIET: Regular  Oral Nutritional Supplements: None None     ACTIVITY: As tolerated    WOUND CARE: None    EQUIPMENT needed: None      DISCHARGE MEDICATIONS:   See Medication Reconciliation Form    · It is important that you take the medication exactly as they are prescribed. · Keep your medication in the bottles provided by the pharmacist and keep a list of the medication names, dosages, and times to be taken in your wallet. · Do not take other medications without consulting your doctor. NOTIFY YOUR PHYSICIAN FOR ANY OF THE FOLLOWING:   Fever over 101 degrees for 24 hours. Chest pain, shortness of breath, fever, chills, nausea, vomiting, diarrhea, change in mentation, falling, weakness, bleeding. Severe pain or pain not relieved by medications. Or, any other signs or symptoms that you may have questions about.       DISPOSITION:   X Home With:   OT  PT  HH  RN       SNF/Inpatient Rehab/LTAC    Independent/assisted living    Hospice    Other:     CDMP Checked:   Yes ***     PROBLEM LIST Updated:  Yes ***       Signed:   MEGAN James  10/22/2021  10:25 AM

## 2021-10-27 ENCOUNTER — OFFICE VISIT (OUTPATIENT)
Dept: UROLOGY | Age: 36
End: 2021-10-27
Payer: MEDICAID

## 2021-10-27 VITALS
HEART RATE: 85 BPM | SYSTOLIC BLOOD PRESSURE: 117 MMHG | BODY MASS INDEX: 38.32 KG/M2 | HEIGHT: 65 IN | DIASTOLIC BLOOD PRESSURE: 75 MMHG | TEMPERATURE: 98.1 F | OXYGEN SATURATION: 97 % | WEIGHT: 230 LBS

## 2021-10-27 DIAGNOSIS — N13.30 HYDRONEPHROSIS, UNSPECIFIED HYDRONEPHROSIS TYPE: ICD-10-CM

## 2021-10-27 DIAGNOSIS — Z96.0 RETAINED URETERAL STENT: ICD-10-CM

## 2021-10-27 DIAGNOSIS — N20.0 BILATERAL KIDNEY STONES: Primary | ICD-10-CM

## 2021-10-27 DIAGNOSIS — N20.0 RENAL STONE: ICD-10-CM

## 2021-10-27 DIAGNOSIS — N20.0 RECURRENT KIDNEY STONES: ICD-10-CM

## 2021-10-27 PROBLEM — N20.1 CALCULUS, URETER: Status: ACTIVE | Noted: 2017-09-02

## 2021-10-27 LAB
BILIRUB UR QL STRIP: NEGATIVE
GLUCOSE UR-MCNC: NEGATIVE MG/DL
KETONES P FAST UR STRIP-MCNC: NEGATIVE MG/DL
PH UR STRIP: 7 [PH] (ref 4.6–8)
PROT UR QL STRIP: NORMAL
SP GR UR STRIP: 1.02 (ref 1–1.03)
UA UROBILINOGEN AMB POC: NORMAL (ref 0.2–1)
URINALYSIS CLARITY POC: CLEAR
URINALYSIS COLOR POC: YELLOW
URINE BLOOD POC: NORMAL
URINE LEUKOCYTES POC: NORMAL
URINE NITRITES POC: NEGATIVE

## 2021-10-27 PROCEDURE — 99204 OFFICE O/P NEW MOD 45 MIN: CPT | Performed by: UROLOGY

## 2021-10-27 PROCEDURE — 52310 CYSTOSCOPY AND TREATMENT: CPT | Performed by: UROLOGY

## 2021-10-27 PROCEDURE — 81003 URINALYSIS AUTO W/O SCOPE: CPT | Performed by: UROLOGY

## 2021-10-27 RX ORDER — ONDANSETRON 4 MG/1
TABLET, FILM COATED ORAL
COMMUNITY
Start: 2021-10-22 | End: 2022-10-14

## 2021-10-27 RX ORDER — TRAMADOL HYDROCHLORIDE 50 MG/1
TABLET ORAL
COMMUNITY
Start: 2021-10-18 | End: 2022-05-09

## 2021-10-27 NOTE — LETTER
NOTIFICATION RETURN TO WORK / SCHOOL    10/27/2021 10:09 AM    Ms. Kim 64 [address] [place] [address]      To Whom It May Concern:    Joselin Carrillo was seen in my office on 10/27/2021     She will have the following limitations: May return to work/school on:  October 29th 2021. If there are questions or concerns please have the patient contact our office.         Sincerely,      [address]   [phone]

## 2021-10-27 NOTE — PROGRESS NOTES
Chief Complaint   Patient presents with    Procedure     cystoscopy stent removal    Kidney Stone    Hydronephrosis     1. Have you been to the ER, urgent care clinic since your last visit? Hospitalized since your last visit? No    2. Have you seen or consulted any other health care providers outside of the [address] [place] [address] since your last visit? Include any pap smears or colon screening.  No  Visit Vitals  /75 (BP 1 Location: Right upper arm, BP Patient Position: Sitting, BP Cuff Size: Adult)   Pulse 85   Temp 98.1 °F (36.7 °C) (Temporal)   Ht 5' 5\" (1.651 m)   Wt 230 lb (104.3 kg)   SpO2 97%   BMI 38.27 kg/m²

## 2021-10-27 NOTE — PROGRESS NOTES
Ongoing SW/CM Assessment/Plan of Care Note     See SW/CM flowsheets for goals and other objective data.    Patient/Family discharge goal (s):  Goal #1: Adjustment/coping issues addressed  Goal #2: Home Care arranged or issues addressed  Goal #3: Transportation arranged or issues addressed    PT Recommendation:  Recommendation for Discharge: PT: Post acute therapy, Sub-acute nursing home    OT Recommendation:  Recommendations for Discharge: OT: Sub-acute nursing home    SLP Recommendation:       Disposition:       Progress note: IPR denied patient.  Per notes, patient would benefit from SNF placement.  Social work met with patient at bedside.  SNF choice explained.  List given.  Patient will consider it.  Social work will follow up tomorrow on possible choices.           Came in today to talk about her stone disease. HISTORY OF PRESENT ILLNESS  Korina Wang is a 39 y.o. female. Chief Complaint   Patient presents with    Procedure     cystoscopy stent removal    Kidney Stone    Hydronephrosis            Today to discuss her stone disease she has bilateral stones she is only 39years of age. Patient is what is causing her stones. She needs a full work-up including 24-hour urines. Additionally she will need her lab work. Her calcium level is 8.4. Is going to need additional lithotripsy. She had obstructing stone destroyed with lithotripsy. And that went very well stone fragments are not back yet for analysis. They were radiopaque under fluoroscopy. As soon as the stone analysis comes back we will set her up for 24-hour urine and full stone work-up she seemed to be good with that discussion  HPI  Chronic Conditions Addressed Today     1. Renal stone     Overview      BILATERAL RENAL STONES: CT 10/10/21 with bilateral calcified renal stones, right > left; the largest of which measures 5.6 mm (right side). 10/19/21 US  Right kidney 11.4 cm and length, without hydronephrosis. Left  kidney 12.7 cm, also without hydronephrosis. Bilateral nonobstructing echogenic  renal calculi, larger on the left. Partially distended urinary bladder appears  unremarkable as visualized. 2. Hydronephrosis     Overview      HYDRONEPHROSIS: moderate obstruction of the left kidney secondary to a mid left ureteral stone measuring 9 x 13 mm. Plan for cystoscopy, ureteroscopy, left RPG, laser lithotripsy and left ureteral stent placement on 10/12/21.  10/12/21-LEFT ureteroscopy with laser lithotripsy and stone basketing, left RPG, and left ureteral stent placement               Patient denies the symptoms of COVID-19 per routine screening guidelines.   ROS  Review of systems unremarkable other than she has urgency and frequency from her retained ureteral stent  Past Medical History:  PMHx (including negatives):  has a past medical history of Kidney calculi. PSurgHx:  has a past surgical history that includes hx  section (); hx tubal ligation (); hx lithotripsy (); hx lithotripsy; hx urological (10/12/2021); hx urological (10/12/2021); hx urological (10/12/2021); and hx urological (10/27/2021). PSocHx:  reports that she has been smoking. She has a 5.00 pack-year smoking history. She uses smokeless tobacco. She reports that she does not drink alcohol and does not use drugs. Home Medications    Medication Sig Start Date End Date Taking? Authorizing Provider   tamsulosin (FLOMAX) 0.4 mg capsule Take 1 Capsule by mouth daily. 10/23/21   MEGAN Ash   oxybutynin (DITROPAN) 5 mg tablet Take 1 Tablet by mouth three (3) times daily. 10/22/21   MEGAN Ash   phenazopyridine (PYRIDIUM) 95 mg tab Take 1 Tablet by mouth three (3) times daily as needed for Pain. 10/13/21   Garrett Madrid MD   ibuprofen (MOTRIN) 400 mg tablet Take 1 Tablet by mouth every six (6) hours as needed for Pain. Patient not taking: Reported on 10/10/2021 8/23/21   Gina Louise MD   cholecalciferol, vitamin D3, (VITAMIN D3 PO) Take  by mouth. Patient not taking: Reported on 2021    Provider, Historical      Physical Exam  Vitals and nursing note reviewed. Constitutional:       Appearance: She is obese. HENT:      Head: Normocephalic. Nose: Nose normal.      Mouth/Throat:      Mouth: Mucous membranes are moist.   Cardiovascular:      Rate and Rhythm: Normal rate and regular rhythm. Pulses: Normal pulses. Pulmonary:      Effort: Pulmonary effort is normal.   Abdominal:      General: Abdomen is flat. Palpations: Abdomen is soft. Musculoskeletal:         General: Normal range of motion. Cervical back: Normal range of motion. Skin:     General: Skin is warm. Neurological:      General: No focal deficit present.       Mental Status: She is alert and oriented to person, place, and time. Psychiatric:         Mood and Affect: Mood normal.         Behavior: Behavior normal.         Thought Content: Thought content normal.         Judgment: Judgment normal.         ASSESSMENT and PLAN  Diagnoses and all orders for this visit:    1. Renal stone    2. Hydronephrosis, unspecified hydronephrosis type    Other orders  -     AMB POC URINALYSIS DIP STICK AUTO W/O MICRO         Lateral stone disease a 14-year-old female. Has not had a full work-up in years of ever.   He is a full stone work-up and decision about long-term medication including potassium citrate

## 2021-10-29 LAB
BACTERIA UR CULT: NO GROWTH
CYTOLOGY SPEC DOC CYTO: NORMAL
PATH REPORT.COMMENTS IMP SPEC: NORMAL
PATH REPORT.GROSS SPEC: NORMAL
PATH REPORT.SITE OF ORIGIN SPEC: NORMAL
PATHOLOGIST NAME: NORMAL
PERFORMED BY, 191120: NORMAL

## 2021-10-31 NOTE — PROGRESS NOTES
Procedure note  preop Diagnosis retained ureteral stent  Post op Diagnosis- same   procedure-cystoscopy with stent removal  Anesthesia-local  Complication-without  Surgeon-Amado  Procedure-patient was prepped and draped in the usual sterile fashion scoped with a flexible cystoscope, stent was identified projecting from the ureteral orifice and grasped with alligator grasper.   The stent was then removed bladder emptied patient taken off the table

## 2021-12-03 ENCOUNTER — TELEPHONE (OUTPATIENT)
Dept: UROLOGY | Age: 36
End: 2021-12-03

## 2022-03-18 PROBLEM — N12 PYELONEPHRITIS: Status: ACTIVE | Noted: 2021-10-20

## 2022-03-19 PROBLEM — N30.01 ACUTE CYSTITIS WITH HEMATURIA: Status: ACTIVE | Noted: 2021-10-20

## 2022-03-19 PROBLEM — N20.0 RENAL STONE: Status: ACTIVE | Noted: 2021-10-10

## 2022-03-20 PROBLEM — N20.1 CALCULUS, URETER: Status: ACTIVE | Noted: 2017-09-02

## 2022-03-20 PROBLEM — N13.30 HYDRONEPHROSIS: Status: ACTIVE | Noted: 2021-10-19

## 2022-05-02 ENCOUNTER — TELEPHONE (OUTPATIENT)
Dept: UROLOGY | Age: 37
End: 2022-05-02

## 2022-05-02 ENCOUNTER — HOSPITAL ENCOUNTER (EMERGENCY)
Age: 37
Discharge: HOME | End: 2022-05-02
Attending: EMERGENCY MEDICINE
Payer: MEDICAID

## 2022-05-02 ENCOUNTER — APPOINTMENT (OUTPATIENT)
Dept: CT IMAGING | Age: 37
End: 2022-05-02
Attending: PHYSICIAN ASSISTANT
Payer: MEDICAID

## 2022-05-02 ENCOUNTER — OFFICE VISIT (OUTPATIENT)
Dept: UROLOGY | Age: 37
End: 2022-05-02

## 2022-05-02 VITALS
HEIGHT: 65 IN | BODY MASS INDEX: 38.32 KG/M2 | WEIGHT: 230 LBS | HEART RATE: 92 BPM | OXYGEN SATURATION: 99 % | TEMPERATURE: 96.8 F | DIASTOLIC BLOOD PRESSURE: 89 MMHG | SYSTOLIC BLOOD PRESSURE: 146 MMHG

## 2022-05-02 VITALS
OXYGEN SATURATION: 99 % | DIASTOLIC BLOOD PRESSURE: 88 MMHG | RESPIRATION RATE: 18 BRPM | SYSTOLIC BLOOD PRESSURE: 149 MMHG | WEIGHT: 220 LBS | BODY MASS INDEX: 36.65 KG/M2 | HEIGHT: 65 IN | TEMPERATURE: 98.4 F | HEART RATE: 76 BPM

## 2022-05-02 DIAGNOSIS — N20.0 KIDNEY STONE: Primary | ICD-10-CM

## 2022-05-02 DIAGNOSIS — N20.0 RENAL STONE: ICD-10-CM

## 2022-05-02 DIAGNOSIS — N13.30 HYDRONEPHROSIS, UNSPECIFIED HYDRONEPHROSIS TYPE: ICD-10-CM

## 2022-05-02 DIAGNOSIS — R31.0 GROSS HEMATURIA: ICD-10-CM

## 2022-05-02 DIAGNOSIS — N20.0 NEPHROLITHIASIS: Primary | ICD-10-CM

## 2022-05-02 LAB
ALBUMIN SERPL-MCNC: 3.7 G/DL (ref 3.5–5)
ALBUMIN/GLOB SERPL: 1 {RATIO} (ref 1.1–2.2)
ALP SERPL-CCNC: 125 U/L (ref 45–117)
ALT SERPL-CCNC: 12 U/L (ref 12–78)
ANION GAP SERPL CALC-SCNC: 3 MMOL/L (ref 5–15)
APPEARANCE UR: ABNORMAL
AST SERPL W P-5'-P-CCNC: 13 U/L (ref 15–37)
BACTERIA URNS QL MICRO: NEGATIVE /HPF
BASOPHILS # BLD: 0.1 K/UL (ref 0–0.1)
BASOPHILS NFR BLD: 0 % (ref 0–1)
BILIRUB SERPL-MCNC: 0.2 MG/DL (ref 0.2–1)
BILIRUB UR QL: NEGATIVE
BILIRUB UR QL: NEGATIVE
BUN SERPL-MCNC: 13 MG/DL (ref 6–20)
BUN/CREAT SERPL: 13 (ref 12–20)
CA-I BLD-MCNC: 9.6 MG/DL (ref 8.5–10.1)
CHLORIDE SERPL-SCNC: 108 MMOL/L (ref 97–108)
CO2 SERPL-SCNC: 27 MMOL/L (ref 21–32)
COLOR UR: ABNORMAL
CREAT SERPL-MCNC: 1.03 MG/DL (ref 0.55–1.02)
DIFFERENTIAL METHOD BLD: ABNORMAL
EOSINOPHIL # BLD: 0.3 K/UL (ref 0–0.4)
EOSINOPHIL NFR BLD: 2 % (ref 0–7)
ERYTHROCYTE [DISTWIDTH] IN BLOOD BY AUTOMATED COUNT: 13.2 % (ref 11.5–14.5)
GLOBULIN SER CALC-MCNC: 3.8 G/DL (ref 2–4)
GLUCOSE SERPL-MCNC: 94 MG/DL (ref 65–100)
GLUCOSE UR STRIP.AUTO-MCNC: NEGATIVE MG/DL
GLUCOSE UR-MCNC: NEGATIVE MG/DL
HCG UR QL: NEGATIVE
HCT VFR BLD AUTO: 40.5 % (ref 35–47)
HGB BLD-MCNC: 13.1 G/DL (ref 11.5–16)
HGB UR QL STRIP: ABNORMAL
IMM GRANULOCYTES # BLD AUTO: 0 K/UL (ref 0–0.04)
IMM GRANULOCYTES NFR BLD AUTO: 0 % (ref 0–0.5)
KETONES P FAST UR STRIP-MCNC: NEGATIVE MG/DL
KETONES UR QL STRIP.AUTO: NEGATIVE MG/DL
LEUKOCYTE ESTERASE UR QL STRIP.AUTO: ABNORMAL
LYMPHOCYTES # BLD: 2.3 K/UL (ref 0.8–3.5)
LYMPHOCYTES NFR BLD: 19 % (ref 12–49)
MCH RBC QN AUTO: 28.2 PG (ref 26–34)
MCHC RBC AUTO-ENTMCNC: 32.3 G/DL (ref 30–36.5)
MCV RBC AUTO: 87.3 FL (ref 80–99)
MONOCYTES # BLD: 0.6 K/UL (ref 0–1)
MONOCYTES NFR BLD: 5 % (ref 5–13)
MUCOUS THREADS URNS QL MICRO: ABNORMAL /LPF
NEUTS SEG # BLD: 8.7 K/UL (ref 1.8–8)
NEUTS SEG NFR BLD: 74 % (ref 32–75)
NITRITE UR QL STRIP.AUTO: NEGATIVE
NRBC # BLD: 0 K/UL (ref 0–0.01)
NRBC BLD-RTO: 0 PER 100 WBC
PH UR STRIP: 6 [PH] (ref 4.6–8)
PH UR STRIP: 6 [PH] (ref 5–8)
PLATELET # BLD AUTO: 341 K/UL (ref 150–400)
PMV BLD AUTO: 10.7 FL (ref 8.9–12.9)
POTASSIUM SERPL-SCNC: 3.7 MMOL/L (ref 3.5–5.1)
PROT SERPL-MCNC: 7.5 G/DL (ref 6.4–8.2)
PROT UR QL STRIP: 100
PROT UR STRIP-MCNC: 100 MG/DL
PVR POC: NORMAL CC
RBC # BLD AUTO: 4.64 M/UL (ref 3.8–5.2)
RBC #/AREA URNS HPF: >100 /HPF (ref 0–5)
SODIUM SERPL-SCNC: 138 MMOL/L (ref 136–145)
SP GR UR REFRACTOMETRY: 1.02 (ref 1–1.03)
SP GR UR STRIP: 1.02 (ref 1–1.03)
UA UROBILINOGEN AMB POC: NORMAL (ref 0.2–1)
UA: UC IF INDICATED,UAUC: ABNORMAL
URINALYSIS CLARITY POC: NORMAL
URINALYSIS COLOR POC: NORMAL
URINE BLOOD POC: NORMAL
URINE LEUKOCYTES POC: NORMAL
URINE NITRITES POC: NEGATIVE
UROBILINOGEN UR QL STRIP.AUTO: 0.1 EU/DL (ref 0.1–1)
WBC # BLD AUTO: 11.9 K/UL (ref 3.6–11)
WBC URNS QL MICRO: >100 /HPF (ref 0–4)

## 2022-05-02 PROCEDURE — 81001 URINALYSIS AUTO W/SCOPE: CPT

## 2022-05-02 PROCEDURE — 99284 EMERGENCY DEPT VISIT MOD MDM: CPT

## 2022-05-02 PROCEDURE — 81025 URINE PREGNANCY TEST: CPT

## 2022-05-02 PROCEDURE — 80053 COMPREHEN METABOLIC PANEL: CPT

## 2022-05-02 PROCEDURE — 99213 OFFICE O/P EST LOW 20 MIN: CPT | Performed by: NURSE PRACTITIONER

## 2022-05-02 PROCEDURE — 74176 CT ABD & PELVIS W/O CONTRAST: CPT

## 2022-05-02 PROCEDURE — 36415 COLL VENOUS BLD VENIPUNCTURE: CPT

## 2022-05-02 PROCEDURE — 81003 URINALYSIS AUTO W/O SCOPE: CPT | Performed by: NURSE PRACTITIONER

## 2022-05-02 PROCEDURE — 85025 COMPLETE CBC W/AUTO DIFF WBC: CPT

## 2022-05-02 PROCEDURE — 87086 URINE CULTURE/COLONY COUNT: CPT

## 2022-05-02 PROCEDURE — 51798 US URINE CAPACITY MEASURE: CPT | Performed by: NURSE PRACTITIONER

## 2022-05-02 NOTE — PROGRESS NOTES
HISTORY OF PRESENT ILLNESS  Ziggy Campbell is a 39 y.o. female is here with c/o of gross hematuria  for the past 2 weeks She has gross hematuria with each void. She denies any frequency,urgency,flank pain,N/V, abdominal pain or fever. She noemi seeing any stones in her urine She reports having sharp pain last night on her right site, but as soon as she voided her pain was gone ( it just happened once). .   She has an IUD placed one year ago, and reports she does not bleed or has any spotting. She just went for a check up appointment with her OBGYN and there was no problems. History:  S/p 10/12/22 left ureteroscopy with laser large stone and nephroscopy with laser stones cystoscopy double-J stent placement  10/27/21- procedure-cystoscopy with stent removal         Past Medical History:  PMHx (including negatives):  has a past medical history of Kidney calculi. PSurgHx:  has a past surgical history that includes hx  section (); hx tubal ligation (); hx lithotripsy (); hx lithotripsy; hx urological (10/12/2021); hx urological (10/12/2021); hx urological (10/12/2021); and hx urological (10/27/2021). PSocHx:  reports that she has been smoking. She has a 5.00 pack-year smoking history. She uses smokeless tobacco. She reports that she does not drink alcohol and does not use drugs. Chronic Conditions Addressed Today     1. Renal stone     Overview      BILATERAL RENAL STONES: CT 10/10/21 with bilateral calcified renal stones, right > left; the largest of which measures 5.6 mm (right side). 10/19/21 US  Right kidney 11.4 cm and length, without hydronephrosis. Left  kidney 12.7 cm, also without hydronephrosis. Bilateral nonobstructing echogenic  renal calculi, larger on the left. Partially distended urinary bladder appears  unremarkable as visualized.          2. Hydronephrosis     Overview      HYDRONEPHROSIS: moderate obstruction of the left kidney secondary to a mid left ureteral stone measuring 9 x 13 mm. Plan for cystoscopy, ureteroscopy, left RPG, laser lithotripsy and left ureteral stent placement on 10/12/21.  10/12/21-LEFT ureteroscopy with laser lithotripsy and stone basketing, left RPG, and left ureteral stent placement             Acute Diagnoses Addressed Today     Kidney stone    -  Primary        Relevant Orders        AMB POC URINALYSIS DIP STICK AUTO W/O MICRO        AMB POC PVR, CAT,POST-VOID RES,US,NON-IMAGING        CULTURE, URINE        XR ABD (KUB)        CT ABD PELV WO CONT    Gross hematuria            Relevant Orders        CT ABD PELV WO CONT          Review of Systems   Constitutional: Negative. HENT: Negative. Eyes: Negative. Respiratory: Negative. Cardiovascular: Negative. Gastrointestinal: Negative. Genitourinary: Positive for hematuria. Skin: Negative. Neurological: Negative. Endo/Heme/Allergies: Negative. Psychiatric/Behavioral: Negative. Patient denies the symptoms of COVID-19 per routine screening guidelines. Home Medications    Medication Sig Start Date End Date Taking? Authorizing Provider   ondansetron hcl (ZOFRAN) 4 mg tablet  10/22/21  Yes Provider, Historical   traMADoL (ULTRAM) 50 mg tablet  10/18/21  Yes Provider, Historical   tamsulosin (FLOMAX) 0.4 mg capsule Take 1 Capsule by mouth daily. 10/23/21  Yes MEGAN Moran   oxybutynin (DITROPAN) 5 mg tablet Take 1 Tablet by mouth three (3) times daily. 10/22/21  Yes MEGAN Moran   phenazopyridine (PYRIDIUM) 95 mg tab Take 1 Tablet by mouth three (3) times daily as needed for Pain. 10/13/21  Yes Fely Nelson MD   ibuprofen (MOTRIN) 400 mg tablet Take 1 Tablet by mouth every six (6) hours as needed for Pain. Patient not taking: Reported on 10/10/2021 8/23/21 5/2/22  Elicia Amaya MD   cholecalciferol, vitamin D3, (VITAMIN D3 PO) Take  by mouth.   Patient not taking: Reported on 8/23/2021 5/2/22  Provider, Historical      Physical Exam  HENT: Head: Normocephalic. Nose: Nose normal.   Eyes:      Pupils: Pupils are equal, round, and reactive to light. Cardiovascular:      Rate and Rhythm: Normal rate. Pulses: Normal pulses. Pulmonary:      Effort: Pulmonary effort is normal.   Abdominal:      General: Abdomen is flat. Palpations: Abdomen is soft. Musculoskeletal:         General: Normal range of motion. Cervical back: Normal range of motion. Skin:     General: Skin is warm. Neurological:      Mental Status: She is alert. Psychiatric:         Mood and Affect: Mood normal.         ASSESSMENT and PLAN  Diagnoses and all orders for this visit:    1. Kidney stone  -     AMB POC URINALYSIS DIP STICK AUTO W/O MICRO  -     AMB POC PVR, CAT,POST-VOID RES,US,NON-IMAGING  -     CULTURE, URINE  -     XR ABD (KUB); Future  -     CT ABD PELV WO CONT; Future    2. Gross hematuria  -     CT ABD PELV WO CONT; Future      3. Hydronephrosis, unspecified hydronephrosis type    4. Renal stone  /p 10/12/22 left ureteroscopy with laser large stone and nephroscopy with laser stones cystoscopy double-J stent placement  10/27/21- procedure-cystoscopy with stent removal   The patient will come back tomorrow for further evaluation possible cystoscopy. Flo Grate may have a reminder for a \"due or due soon\" health maintenance. The patient has been encouraged to contact their primary care provider for follow-up on this health maintenance or other necessary and/or routine health screening.      Rafael Rocha NP

## 2022-05-02 NOTE — PROGRESS NOTES
HISTORY OF PRESENT ILLNESS  Charmaine Grimm is a 39 y.o. female. Today, patient complains  right  Flank pain radiating to her right groin area and her lower  Abdomen. She still has gross hematuria. She has right flank pain with palpation. She denies any symptoms of fever,chills, N/V.    5/3/22 c/o of gross hematuria  for the past 2 weeks She has gross hematuria with each void. She denies any frequency,urgency,flank pain,N/V, abdominal pain or fever. She noemi seeing any stones in her urine She reports having sharp pain a day ago on her right site, but as soon as she voided her pain was gone ( it just happened once). .   She has an IUD placed one year ago, and reports she does not bleed or has any spotting. She just went for a check up appointment with her OBGYN and there was no problems. History:  S/p 10/12/22 left ureteroscopy with laser large stone and nephroscopy with laser stones cystoscopy double-J stent placement  10/27/21- procedure-cystoscopy with stent removal  Ct scan from 22  Stone content bilateral kidneys; multiple stones present through each upper  collecting system ranging up to 7-8 mm. No hydronephrosis. IUD central uterus. 3 cm demarcated hypodense structure left ovary, likely  ovarian cyst.           Past Medical History:  PMHx (including negatives):  has a past medical history of Kidney calculi. PSurgHx:  has a past surgical history that includes hx  section (); hx tubal ligation (); hx lithotripsy (); hx lithotripsy; hx urological (10/12/2021); hx urological (10/12/2021); hx urological (10/12/2021); hx urological (10/27/2021); and hx urological (2022). PSocHx:  reports that she has been smoking. She has a 5.00 pack-year smoking history. She uses smokeless tobacco. She reports that she does not drink alcohol and does not use drugs. Chronic Conditions Addressed Today     1.  Renal stone - Primary     Overview      BILATERAL RENAL STONES: CT 10/10/21 with bilateral calcified renal stones, right > left; the largest of which measures 5.6 mm (right side). 10/19/21 US  Right kidney 11.4 cm and length, without hydronephrosis. Left  kidney 12.7 cm, also without hydronephrosis. Bilateral nonobstructing echogenic  renal calculi, larger on the left. Partially distended urinary bladder appears  unremarkable as visualized. Relevant Orders     AMB POC URINALYSIS DIP STICK AUTO W/O MICRO    2. Cystitis    3. Right flank pain    4. Kidney infection     Relevant Medications     trimethoprim-sulfamethoxazole (BACTRIM DS, SEPTRA DS) 160-800 mg per tablet     Other Relevant Orders     AMB POC URINALYSIS DIP STICK AUTO W/O MICRO          Review of Systems   Constitutional: Negative. HENT: Negative. Respiratory: Negative. Cardiovascular: Negative. Gastrointestinal: Negative. Genitourinary: Positive for flank pain and hematuria. Skin: Negative. Neurological: Negative. Endo/Heme/Allergies: Negative. Psychiatric/Behavioral: Negative. Patient denies the symptoms of COVID-19 per routine screening guidelines. Home Medications    Medication Sig Start Date End Date Taking? Authorizing Provider   trimethoprim-sulfamethoxazole (BACTRIM DS, SEPTRA DS) 160-800 mg per tablet Take 1 Tablet by mouth two (2) times a day for 10 days. 5/3/22 5/13/22 Yes Nikki Rodriguez NP   ondansetron hcl (ZOFRAN) 4 mg tablet  10/22/21  Yes Provider, Historical   traMADoL (ULTRAM) 50 mg tablet  10/18/21  Yes Provider, Historical   tamsulosin (FLOMAX) 0.4 mg capsule Take 1 Capsule by mouth daily. 10/23/21  Yes Lyndle Falling, FNP   oxybutynin (DITROPAN) 5 mg tablet Take 1 Tablet by mouth three (3) times daily. 10/22/21  Yes Lyndle Falling, FNP   phenazopyridine (PYRIDIUM) 95 mg tab Take 1 Tablet by mouth three (3) times daily as needed for Pain. 10/13/21  Yes Gissel Larose MD      Physical Exam  Vitals and nursing note reviewed.    Constitutional: General: She is in acute distress. Appearance: She is obese. HENT:      Head: Normocephalic. Right Ear: Tympanic membrane normal.      Nose: Nose normal.      Mouth/Throat:      Mouth: Mucous membranes are moist.   Eyes:      Pupils: Pupils are equal, round, and reactive to light. Cardiovascular:      Rate and Rhythm: Normal rate. Pulses: Normal pulses. Pulmonary:      Effort: Pulmonary effort is normal.   Abdominal:      General: Abdomen is flat. Tenderness: There is right CVA tenderness. Genitourinary:     Comments: deferred  Musculoskeletal:         General: Normal range of motion. Cervical back: Normal range of motion. Skin:     General: Skin is warm. Neurological:      General: No focal deficit present. Mental Status: She is alert and oriented to person, place, and time. Psychiatric:         Mood and Affect: Mood normal.         Behavior: Behavior normal.         Thought Content: Thought content normal.         Judgment: Judgment normal.         ASSESSMENT and PLAN  Diagnoses and all orders for this visit:    1. Renal stone  -     AMB POC URINALYSIS DIP STICK AUTO W/O MICRO    2. Kidney infection  -     AMB POC URINALYSIS DIP STICK AUTO W/O MICRO    Other orders  -     trimethoprim-sulfamethoxazole (BACTRIM DS, SEPTRA DS) 160-800 mg per tablet; Take 1 Tablet by mouth two (2) times a day for 10 days. Patient will come back next week for re-evaluation. She is scheduled for cysto and ureteroscopy in 2 weeks     Awaiting her urine culture          Cherry Coffey may have a reminder for a \"due or due soon\" health maintenance. The patient has been encouraged to contact their primary care provider for follow-up on this health maintenance or other necessary and/or routine health screening.

## 2022-05-02 NOTE — PROGRESS NOTES
Chief Complaint   Patient presents with    Follow-up    Gross Hematuria     pt has no pain conserned about bleeding like this for 2 weeks        PHQ-9 score is    Negative    Vitals:    05/02/22 1349   BP: (!) 146/89   Pulse: 92   Temp: 96.8 °F (36 °C)   TempSrc: Temporal   SpO2: 99%   Weight: 230 lb (104.3 kg)   Height: 5' 5\" (1.651 m)   PainSc:   0 - No pain      1. \"Have you been to the ER, urgent care clinic since your last visit? Hospitalized since your last visit? \" No    2. \"Have you seen or consulted any other health care providers outside of the [address] [place] [address] since your last visit? \" No     3. For patients aged 39-70: Has the patient had a colonoscopy / FIT/ Cologuard? No      If the patient is female:    4. For patients aged 41-77: Has the patient had a mammogram within the past 2 years? No      5. For patients aged 21-65: Has the patient had a pap smear?  No

## 2022-05-02 NOTE — ED PROVIDER NOTES
EMERGENCY DEPARTMENT HISTORY AND PHYSICAL EXAM      Date: 2022  Patient Name: Patricio Veloz    History of Presenting Illness     Chief Complaint   Patient presents with    Blood in Urine       History Provided By: Patient    HPI: Patricio Veloz, 39 y.o. female with a past medical history significant for kidney stones who presents to the ED with cc of hematuria. Patient was referred to ED by her urologist for a CT of her abdomen after she developed gross hematuria today. Patient denies any associated pain. Patient states that she is otherwise well and has no other concerns. She specifically denies any fever, chills, cough, congestion, sore throat, abdominal pain, nausea, vomiting, diarrhea, difficulty urinating, dysuria, frequency, vaginal discharge, pelvic pain, flank pain, headaches, lightheadedness, dizziness, diaphoresis, or rash. There are no other complaints, changes, or physical findings at this time. PCP: Zachary Britton MD    No current facility-administered medications on file prior to encounter. Current Outpatient Medications on File Prior to Encounter   Medication Sig Dispense Refill    ondansetron hcl (ZOFRAN) 4 mg tablet       traMADoL (ULTRAM) 50 mg tablet       tamsulosin (FLOMAX) 0.4 mg capsule Take 1 Capsule by mouth daily. 30 Capsule 0    oxybutynin (DITROPAN) 5 mg tablet Take 1 Tablet by mouth three (3) times daily. 90 Tablet 0    phenazopyridine (PYRIDIUM) 95 mg tab Take 1 Tablet by mouth three (3) times daily as needed for Pain.  10 Tablet 0       Past History     Past Medical History:  Past Medical History:   Diagnosis Date    Kidney calculi        Past Surgical History:  Past Surgical History:   Procedure Laterality Date    HX  SECTION      HX LITHOTRIPSY      \"multiple times but  was most recent\"    HX LITHOTRIPSY      HX TUBAL LIGATION      HX UROLOGICAL  10/12/2021    CYSTOSCOPY, URETEROSCOPY WITH LASER LITHOTRIPSY,     HX UROLOGICAL 10/12/2021    BILATERAL RETROGRADE PYELOGRAM;     HX UROLOGICAL  10/12/2021    LEFT URETERAL STENT PLACEMENT     HX UROLOGICAL  10/27/2021    cystoscopy stent remove    HX UROLOGICAL  05/03/2022    cystoscopy       Family History:  History reviewed. No pertinent family history. Social History:  Social History     Tobacco Use    Smoking status: Current Every Day Smoker     Packs/day: 0.50     Years: 10.00     Pack years: 5.00    Smokeless tobacco: Current User   Vaping Use    Vaping Use: Never used   Substance Use Topics    Alcohol use: No     Comment: rarely    Drug use: Never       Allergies: Allergies   Allergen Reactions    Ciprofloxacin Other (comments)     Thrush         Review of Systems     Review of Systems   Constitutional: Negative. Negative for chills, diaphoresis and fever. HENT: Negative. Negative for congestion, rhinorrhea and sore throat. Eyes: Negative. Respiratory: Negative. Negative for cough and shortness of breath. Cardiovascular: Negative. Negative for chest pain. Gastrointestinal: Negative. Negative for abdominal pain, diarrhea, nausea and vomiting. Genitourinary: Positive for hematuria. Negative for difficulty urinating, dysuria, flank pain, frequency, pelvic pain, urgency, vaginal bleeding and vaginal discharge. Musculoskeletal: Negative. Negative for back pain and gait problem. Skin: Negative. Negative for rash. Neurological: Negative. Negative for dizziness, weakness, numbness and headaches. Psychiatric/Behavioral: Negative. All other systems reviewed and are negative. Physical Exam     Physical Exam  Vitals and nursing note reviewed. Constitutional:       General: She is not in acute distress. Appearance: Normal appearance. She is obese. She is not ill-appearing or toxic-appearing. HENT:      Head: Normocephalic and atraumatic. Mouth/Throat:      Mouth: Mucous membranes are moist.      Pharynx: Oropharynx is clear.    Eyes: Extraocular Movements: Extraocular movements intact. Conjunctiva/sclera: Conjunctivae normal.      Pupils: Pupils are equal, round, and reactive to light. Cardiovascular:      Rate and Rhythm: Normal rate and regular rhythm. Pulses: Normal pulses. Heart sounds: Normal heart sounds. No murmur heard. No friction rub. No gallop. Pulmonary:      Effort: Pulmonary effort is normal.      Breath sounds: Normal breath sounds. No wheezing, rhonchi or rales. Abdominal:      General: Bowel sounds are normal. There is no distension. Palpations: Abdomen is soft. Tenderness: There is no abdominal tenderness. There is no right CVA tenderness, left CVA tenderness, guarding or rebound. Negative signs include Mc's sign and McBurney's sign. Genitourinary:     Comments: Patient deferred pelvic exam  Musculoskeletal:      Cervical back: Neck supple. No tenderness. Right lower leg: No edema. Left lower leg: No edema. Skin:     General: Skin is warm and dry. Capillary Refill: Capillary refill takes less than 2 seconds. Findings: No rash. Neurological:      General: No focal deficit present. Mental Status: She is alert and oriented to person, place, and time. Sensory: Sensation is intact. Motor: Motor function is intact. Gait: Gait is intact. Psychiatric:         Mood and Affect: Mood normal.         Behavior: Behavior normal.         Lab and Diagnostic Study Results     Labs -     No results found for this or any previous visit (from the past 12 hour(s)). Radiologic Studies -   @lastxrresult@  CT Results  (Last 48 hours)    None        CXR Results  (Last 48 hours)    None            Medical Decision Making   - I am the first provider for this patient. - I reviewed the vital signs, available nursing notes, past medical history, past surgical history, family history and social history. - Initial assessment performed.  The patients presenting problems have been discussed, and they are in agreement with the care plan formulated and outlined with them. I have encouraged them to ask questions as they arise throughout their visit. Vital Signs-Reviewed the patient's vital signs. No data found. Records Reviewed: Nursing Notes and Old Medical Records         Provider Notes (Medical Decision Making):     MDM  Number of Diagnoses or Management Options  Nephrolithiasis  Diagnosis management comments: 14-year-old female presents with gross hematuria. She has extensive history of kidney stones for which she is followed by Dr. Sarah Sandoval.  Patient presents nontoxic, in no acute distress, with stable vital signs, and a benign exam.  She has no complaints of pain. Patient already has cystoscopy scheduled this week. We will order UA, assess with CT abdomen as requested by her urologist, reassess, anticipate discharge with close follow-up with urology as previous scheduled. Amount and/or Complexity of Data Reviewed  Clinical lab tests: ordered and reviewed  Tests in the radiology section of CPT®: ordered and reviewed  Tests in the medicine section of CPT®: ordered and reviewed  Review and summarize past medical records: yes       ED Course:   5:43 PM  Patient reassessed and updated on all results and findings. She has been pain-free during entire ED stay  Patient has close follow-up scheduled Dr. Sarah Sandoval tomorrow morning for cystoscopy. Patient has been educated on strict return precautions conveys good understanding agree with care plan as outlined. She has no additional complaints or concerns and all her questions have been answered. Anticipate discharge home shortly      Procedures   Medical Decision Makingedical Decision Making  Performed by: Cristal Khan PA-C  PROCEDURES:  Procedures       Disposition   Disposition: DC- Adult Discharges: All of the diagnostic tests were reviewed and questions answered.  Diagnosis, care plan and treatment options were discussed. The patient understands the instructions and will follow up as directed. The patients results have been reviewed with them. They have been counseled regarding their diagnosis. The patient verbally convey understanding and agreement of the signs, symptoms, diagnosis, treatment and prognosis and additionally agrees to follow up as recommended with their PCP in 24 - 48 hours. They also agree with the care-plan and convey that all of their questions have been answered. I have also put together some discharge instructions for them that include: 1) educational information regarding their diagnosis, 2) how to care for their diagnosis at home, as well a 3) list of reasons why they would want to return to the ED prior to their follow-up appointment, should their condition change. Discharged    DISCHARGE PLAN:  1. Current Discharge Medication List      CONTINUE these medications which have NOT CHANGED    Details   ondansetron hcl (ZOFRAN) 4 mg tablet       traMADoL (ULTRAM) 50 mg tablet       tamsulosin (FLOMAX) 0.4 mg capsule Take 1 Capsule by mouth daily. Qty: 30 Capsule, Refills: 0      oxybutynin (DITROPAN) 5 mg tablet Take 1 Tablet by mouth three (3) times daily. Qty: 90 Tablet, Refills: 0      phenazopyridine (PYRIDIUM) 95 mg tab Take 1 Tablet by mouth three (3) times daily as needed for Pain. Qty: 10 Tablet, Refills: 0         STOP taking these medications       ibuprofen (MOTRIN) 400 mg tablet Comments:   Reason for Stopping:         cholecalciferol, vitamin D3, (VITAMIN D3 PO) Comments:   Reason for Stoppin.   Follow-up Information     Follow up With Specialties Details Why London Bellamy MD Urology Go to  tomorrow as previously scheduled [address] [facility]  [phone]          3. Return to ED if worse   4. Discharge Medication List as of 2022  5:46 PM            Diagnosis     Clinical Impression:   1. Nephrolithiasis        Attestations:    Cameron Farrell PA-C    Please note that this dictation was completed with [employer], the computer voice recognition software. Quite often unanticipated grammatical, syntax, homophones, and other interpretive errors are inadvertently transcribed by the computer software. Please disregard these errors. Please excuse any errors that have escaped final proofreading. Thank you.

## 2022-05-02 NOTE — Clinical Note
[address] [facility] EMERGENCY DEPT  [address] [facility] [place] [address]  [phone]    Work/School Note    Date: 5/2/2022    To Whom It May concern:      Zia Maloney was seen and treated today in the emergency room by the following provider(s):  Attending Provider: Christina Garrett MD  Physician Assistant: Eyal Owens PA-C. Zia Maloney is excused from work/school on 05/02/22. She is clear to return to work/school on 05/03/22.         Sincerely,          Norah Camejo PA-C

## 2022-05-02 NOTE — TELEPHONE ENCOUNTER
Patient is est with Cecily Weaver-  Has has blood in urine for past 2 weeks, and still currently has blood in urine  pls advise appt date and time

## 2022-05-03 ENCOUNTER — OFFICE VISIT (OUTPATIENT)
Dept: UROLOGY | Age: 37
End: 2022-05-03
Payer: MEDICAID

## 2022-05-03 VITALS
BODY MASS INDEX: 36.65 KG/M2 | WEIGHT: 220 LBS | TEMPERATURE: 96.8 F | DIASTOLIC BLOOD PRESSURE: 80 MMHG | SYSTOLIC BLOOD PRESSURE: 137 MMHG | HEART RATE: 90 BPM | OXYGEN SATURATION: 99 % | HEIGHT: 65 IN

## 2022-05-03 DIAGNOSIS — R10.9 RIGHT FLANK PAIN: ICD-10-CM

## 2022-05-03 DIAGNOSIS — N20.0 RENAL STONE: Primary | ICD-10-CM

## 2022-05-03 DIAGNOSIS — N30.90 CYSTITIS: ICD-10-CM

## 2022-05-03 DIAGNOSIS — N15.9 KIDNEY INFECTION: ICD-10-CM

## 2022-05-03 LAB
BILIRUB UR QL: NEGATIVE
GLUCOSE UR-MCNC: NEGATIVE MG/DL
KETONES P FAST UR STRIP-MCNC: NEGATIVE MG/DL
PH UR STRIP: 7 [PH] (ref 4.6–8)
PROT UR QL STRIP: 30
SP GR UR STRIP: 1.02 (ref 1–1.03)
UA UROBILINOGEN AMB POC: NORMAL (ref 0.2–1)
URINALYSIS CLARITY POC: CLEAR
URINALYSIS COLOR POC: YELLOW
URINE BLOOD POC: NORMAL
URINE LEUKOCYTES POC: NORMAL
URINE NITRITES POC: NEGATIVE

## 2022-05-03 PROCEDURE — 99214 OFFICE O/P EST MOD 30 MIN: CPT | Performed by: UROLOGY

## 2022-05-03 PROCEDURE — 81003 URINALYSIS AUTO W/O SCOPE: CPT | Performed by: UROLOGY

## 2022-05-03 RX ORDER — SULFAMETHOXAZOLE AND TRIMETHOPRIM 800; 160 MG/1; MG/1
1 TABLET ORAL 2 TIMES DAILY
Qty: 20 TABLET | Refills: 0 | Status: SHIPPED | OUTPATIENT
Start: 2022-05-03 | End: 2022-05-13

## 2022-05-03 NOTE — LETTER
NOTIFICATION RETURN TO WORK / SCHOOL    5/3/2022 9:55 AM    Ms. Deep Tejada  [address] [place] [address] [address] [address]      To Whom It May Concern:    Deep Tejada is currently under the care of JASMYN Estrada. She will return to work/school on: 5-3-22    If there are questions or concerns please have the patient contact our office.         Sincerely,      Rick De Leon MD

## 2022-05-03 NOTE — PROGRESS NOTES
Chief Complaint   Patient presents with    Cystoscopy    Gross Hematuria     for two weeks        PHQ-9 score is    Negative    Vitals:    05/03/22 0915   BP: 137/80   Pulse: 90   Temp: 96.8 °F (36 °C)   TempSrc: Temporal   SpO2: 99%   Weight: 220 lb (99.8 kg)   Height: 5' 5\" (1.651 m)   PainSc:   0 - No pain      1. \"Have you been to the ER, urgent care clinic since your last visit? Hospitalized since your last visit? \" No    2. \"Have you seen or consulted any other health care providers outside of the [address] [place] [address] since your last visit? \" No     3. For patients aged 39-70: Has the patient had a colonoscopy / FIT/ Cologuard? No      If the patient is female:    4. For patients aged 41-77: Has the patient had a mammogram within the past 2 years? No      5. For patients aged 21-65: Has the patient had a pap smear?  No

## 2022-05-04 LAB
BACTERIA SPEC CULT: NORMAL
BACTERIA UR CULT: NORMAL
SPECIAL REQUESTS,SREQ: NORMAL

## 2022-05-06 ENCOUNTER — TELEPHONE (OUTPATIENT)
Dept: UROLOGY | Age: 37
End: 2022-05-06

## 2022-05-06 NOTE — TELEPHONE ENCOUNTER
I spoke to the patient today. She reports feeling better today. She reports having dull flank pain. She takes her abx. She denies any N/V,chills or fever. She will see Dr Moisés Carrizales next week in follow up appointment.

## 2022-05-06 NOTE — TELEPHONE ENCOUNTER
Patient contacted the office stateing she was in a lot of pain from her kidney stones.  She was just seen on 05/03/2022    But needs advisement on what she can do

## 2022-05-08 PROBLEM — R31.0 GROSS HEMATURIA: Status: ACTIVE | Noted: 2022-05-08

## 2022-05-08 NOTE — PROGRESS NOTES
HISTORY OF PRESENT ILLNESS  Soto Cope is a 39 y.o. female she here for follow up appointment on her right flank pain. Her urine culture was negative. On 5/3/22-Right Flank pain radiating to her right groin area and her lower  Abdomen. She still has gross hematuria. She has right flank pain with palpation. She denies any symptoms of fever,chills, N/V. She was given bactrim for 10 days. Today she reports no gross hematuria. She reports nausea,  groin pain on and off. She has no right flank pain, her pain is on the right side, dull, relived with pressure. He urine positive for large blood and leuk. Her last urine culture was negative  Urine cultures have been negative CT scan unremarkable. She is not real discomfort today and I am not sure what the etiology of her discomfort is. I will again need to wait 2 weeks to scope her I think we will scope her tomorrow and get a better idea what is causing her pain and discomfort hopefully is a stone that was missed on CT scan on her right side set her up for ureteroscopy. She is aware the risk of bleeding infection injury to the kidney ureter vascular injury pulmonary embolus death is aware of alternatives she has no questions  Ct scan from 22  Stone content bilateral kidneys; multiple stones present through each upper  collecting system ranging up to 7-8 mm. No hydronephrosis.          Past Medical History:  PMHx (including negatives):  has a past medical history of Kidney calculi. PSurgHx:  has a past surgical history that includes hx  section (); hx tubal ligation (); hx lithotripsy (); hx lithotripsy; hx urological (10/12/2021); hx urological (10/12/2021); hx urological (10/12/2021); hx urological (10/27/2021); and hx urological (2022). PSocHx:  reports that she has been smoking. She has a 5.00 pack-year smoking history. She uses smokeless tobacco. She reports that she does not drink alcohol and does not use drugs.        Chronic Conditions Addressed Today     1. Renal stone     Overview      BILATERAL RENAL STONES: CT 10/10/21 with bilateral calcified renal stones, right > left; the largest of which measures 5.6 mm (right side). 10/19/21 US  Right kidney 11.4 cm and length, without hydronephrosis. Left  kidney 12.7 cm, also without hydronephrosis. Bilateral nonobstructing echogenic  renal calculi, larger on the left. Partially distended urinary bladder appears  unremarkable as visualized. 2. Hydronephrosis - Primary     Overview      HYDRONEPHROSIS: moderate obstruction of the left kidney secondary to a mid left ureteral stone measuring 9 x 13 mm. Plan for cystoscopy, ureteroscopy, left RPG, laser lithotripsy and left ureteral stent placement on 10/12/21.  10/12/21-LEFT ureteroscopy with laser lithotripsy and stone basketing, left RPG, and left ureteral stent placement           3. Right flank pain     Overview      5/3/22- right  Flank pain radiating to her right groin area and her lower  Abdomen. She still has gross hematuria. She has right flank pain with palpation. She denies any symptoms of fever,chills, N/V.         4. Gross hematuria     Overview      5/3/22 c/o of gross hematuria  for the past 2 weeks She has gross hematuria with each void. She denies any frequency,urgency,flank pain,N/V, abdominal pain or fever. She noemi seeing any stones in her urine She reports having sharp pain a day ago on her right site, but as soon as she voided her pain was gone ( it just happened once). .   She has an IUD placed one year ago, and reports she does not bleed or has any spotting. She just went for a check up appointment with her OBGYN and there was no problems. Review of Systems   Constitutional: Negative. HENT: Negative. Respiratory: Negative. Cardiovascular: Negative. Gastrointestinal: Positive for nausea. Genitourinary: Positive for flank pain. Skin: Negative. Neurological: Negative. Endo/Heme/Allergies: Negative. Psychiatric/Behavioral: Negative. Patient denies the symptoms of COVID-19 per routine screening guidelines. Home Medications    Medication Sig Start Date End Date Taking? Authorizing Provider   trimethoprim-sulfamethoxazole (BACTRIM DS, SEPTRA DS) 160-800 mg per tablet Take 1 Tablet by mouth two (2) times a day for 10 days. 5/3/22 5/13/22  Kaden Alicea NP   ondansetron hcl (ZOFRAN) 4 mg tablet  10/22/21   Provider, Historical   traMADoL (ULTRAM) 50 mg tablet  10/18/21   Provider, Historical   tamsulosin (FLOMAX) 0.4 mg capsule Take 1 Capsule by mouth daily. 10/23/21   MEGAN Gilman   oxybutynin (DITROPAN) 5 mg tablet Take 1 Tablet by mouth three (3) times daily. 10/22/21   MEGAN Gilman   phenazopyridine (PYRIDIUM) 95 mg tab Take 1 Tablet by mouth three (3) times daily as needed for Pain. 10/13/21   Domo Madrid MD      Physical Exam  Vitals and nursing note reviewed. Exam conducted with a chaperone present. Constitutional:       General: She is in acute distress. Appearance: She is obese. She is ill-appearing. HENT:      Head: Normocephalic. Nose: Nose normal.      Mouth/Throat:      Mouth: Mucous membranes are moist.   Eyes:      Pupils: Pupils are equal, round, and reactive to light. Cardiovascular:      Rate and Rhythm: Normal rate. Pulses: Normal pulses. Pulmonary:      Effort: Pulmonary effort is normal.   Abdominal:      General: Abdomen is flat. Palpations: Abdomen is soft. Tenderness: There is right CVA tenderness. Genitourinary:     Comments: Deferred  Musculoskeletal:         General: Normal range of motion. Cervical back: Normal range of motion. Skin:     General: Skin is warm. Neurological:      General: No focal deficit present. Mental Status: She is alert and oriented to person, place, and time.    Psychiatric:         Mood and Affect: Mood normal.         Behavior: Behavior normal.         Thought Content: Thought content normal.         Judgment: Judgment normal.         ASSESSMENT and PLAN  Diagnoses and all orders for this visit:    1. Hydronephrosis, unspecified hydronephrosis type    2. Renal stone    3. Gross hematuria    4. Right flank pain           Patient for ureteroscopy tomorrow possible stent placement possible stone extraction she is aware the risk of bleeding infection injury kidney ureter vascular injury pulm medicine death she has no questions      Williamson Breath may have a reminder for a \"due or due soon\" health maintenance. The patient has been encouraged to contact their primary care provider for follow-up on this health maintenance or other necessary and/or routine health screening.

## 2022-05-09 NOTE — PROGRESS NOTES
Patient completed phone assessment, given pre operative instruction, and pt gave back verbal understanding.

## 2022-05-11 ENCOUNTER — OFFICE VISIT (OUTPATIENT)
Dept: UROLOGY | Age: 37
End: 2022-05-11
Payer: MEDICAID

## 2022-05-11 VITALS
OXYGEN SATURATION: 99 % | TEMPERATURE: 97.8 F | SYSTOLIC BLOOD PRESSURE: 145 MMHG | HEIGHT: 65 IN | HEART RATE: 85 BPM | WEIGHT: 220 LBS | BODY MASS INDEX: 36.65 KG/M2 | DIASTOLIC BLOOD PRESSURE: 88 MMHG

## 2022-05-11 DIAGNOSIS — R10.9 RIGHT FLANK PAIN: ICD-10-CM

## 2022-05-11 DIAGNOSIS — N20.0 RENAL STONE: ICD-10-CM

## 2022-05-11 DIAGNOSIS — R31.0 GROSS HEMATURIA: ICD-10-CM

## 2022-05-11 DIAGNOSIS — N12 PYELONEPHRITIS: ICD-10-CM

## 2022-05-11 DIAGNOSIS — N13.30 HYDRONEPHROSIS, UNSPECIFIED HYDRONEPHROSIS TYPE: Primary | ICD-10-CM

## 2022-05-11 LAB
BILIRUB UR QL: NEGATIVE
GLUCOSE UR-MCNC: NEGATIVE MG/DL
KETONES P FAST UR STRIP-MCNC: NEGATIVE MG/DL
PH UR STRIP: 6.5 [PH] (ref 4.6–8)
PROT UR QL STRIP: NEGATIVE
SP GR UR STRIP: 1.01 (ref 1–1.03)
UA UROBILINOGEN AMB POC: NORMAL (ref 0.2–1)
URINALYSIS CLARITY POC: CLEAR
URINALYSIS COLOR POC: YELLOW
URINE BLOOD POC: NORMAL
URINE LEUKOCYTES POC: NORMAL
URINE NITRITES POC: NEGATIVE

## 2022-05-11 PROCEDURE — 81003 URINALYSIS AUTO W/O SCOPE: CPT | Performed by: UROLOGY

## 2022-05-11 PROCEDURE — 99214 OFFICE O/P EST MOD 30 MIN: CPT | Performed by: UROLOGY

## 2022-05-11 NOTE — PROGRESS NOTES
Chief Complaint   Patient presents with    Follow-up    Urinary Pain     ros     Kidney Stone       PHQ-9 score is    Negative    Vitals:    05/11/22 0905   BP: (!) 145/88   Pulse: 85   Temp: 97.8 °F (36.6 °C)   TempSrc: Temporal   SpO2: 99%   Weight: 220 lb (99.8 kg)   Height: 5' 5\" (1.651 m)   PainSc:   6      1. \"Have you been to the ER, urgent care clinic since your last visit? Hospitalized since your last visit? \" No    2. \"Have you seen or consulted any other health care providers outside of the [address] [place] [address] since your last visit? \" No     3. For patients aged 39-70: Has the patient had a colonoscopy / FIT/ Cologuard? No      If the patient is female:    4. For patients aged 41-77: Has the patient had a mammogram within the past 2 years? No      5. For patients aged 21-65: Has the patient had a pap smear?  No

## 2022-05-11 NOTE — LETTER
NOTIFICATION RETURN TO WORK / SCHOOL    5/11/2022 9:52 AM    Ms. Charmaine Grimm  [address] [address]      To Whom It May Concern:    Charmaine Grimm is currently under the care of JASMYN Estrada. From 5-10-22 - 5-16-22    She will return to work/school on: 5-16-22    If there are questions or concerns please have the patient contact our office.         Sincerely,      Enrique Jenkins MD

## 2022-05-12 ENCOUNTER — APPOINTMENT (OUTPATIENT)
Dept: GENERAL RADIOLOGY | Age: 37
End: 2022-05-12
Attending: UROLOGY
Payer: MEDICAID

## 2022-05-12 ENCOUNTER — HOSPITAL ENCOUNTER (OUTPATIENT)
Age: 37
Discharge: HOME | End: 2022-05-12
Attending: UROLOGY | Admitting: UROLOGY
Payer: MEDICAID

## 2022-05-12 ENCOUNTER — ANESTHESIA EVENT (OUTPATIENT)
Dept: SURGERY | Age: 37
End: 2022-05-12
Payer: MEDICAID

## 2022-05-12 ENCOUNTER — ANESTHESIA (OUTPATIENT)
Dept: SURGERY | Age: 37
End: 2022-05-12
Payer: MEDICAID

## 2022-05-12 VITALS
TEMPERATURE: 98.1 F | RESPIRATION RATE: 18 BRPM | BODY MASS INDEX: 36.65 KG/M2 | DIASTOLIC BLOOD PRESSURE: 80 MMHG | SYSTOLIC BLOOD PRESSURE: 135 MMHG | HEIGHT: 65 IN | OXYGEN SATURATION: 98 % | HEART RATE: 72 BPM | WEIGHT: 220 LBS

## 2022-05-12 PROBLEM — R10.9 PAIN IN THE ABDOMEN: Status: ACTIVE | Noted: 2022-05-12

## 2022-05-12 LAB — HCG UR QL: NEGATIVE

## 2022-05-12 PROCEDURE — 76210000022 HC REC RM PH II 1.5 TO 2 HR: Performed by: UROLOGY

## 2022-05-12 PROCEDURE — 74011000250 HC RX REV CODE- 250: Performed by: NURSE ANESTHETIST, CERTIFIED REGISTERED

## 2022-05-12 PROCEDURE — 74011250636 HC RX REV CODE- 250/636: Performed by: NURSE ANESTHETIST, CERTIFIED REGISTERED

## 2022-05-12 PROCEDURE — 81025 URINE PREGNANCY TEST: CPT

## 2022-05-12 PROCEDURE — 76060000032 HC ANESTHESIA 0.5 TO 1 HR: Performed by: UROLOGY

## 2022-05-12 PROCEDURE — 76000 FLUOROSCOPY <1 HR PHYS/QHP: CPT

## 2022-05-12 PROCEDURE — C1758 CATHETER, URETERAL: HCPCS | Performed by: UROLOGY

## 2022-05-12 PROCEDURE — 74011250636 HC RX REV CODE- 250/636: Performed by: UROLOGY

## 2022-05-12 PROCEDURE — 2709999900 HC NON-CHARGEABLE SUPPLY: Performed by: UROLOGY

## 2022-05-12 PROCEDURE — 76010000160 HC OR TIME 0.5 TO 1 HR INTENSV-TIER 1: Performed by: UROLOGY

## 2022-05-12 PROCEDURE — 74011250637 HC RX REV CODE- 250/637: Performed by: ANESTHESIOLOGY

## 2022-05-12 PROCEDURE — 77030040361 HC SLV COMPR DVT MDII -B: Performed by: UROLOGY

## 2022-05-12 PROCEDURE — 76210000006 HC OR PH I REC 0.5 TO 1 HR: Performed by: UROLOGY

## 2022-05-12 PROCEDURE — 52353 CYSTOURETERO W/LITHOTRIPSY: CPT | Performed by: UROLOGY

## 2022-05-12 PROCEDURE — 74011250636 HC RX REV CODE- 250/636: Performed by: ANESTHESIOLOGY

## 2022-05-12 RX ORDER — LIDOCAINE HYDROCHLORIDE 20 MG/ML
INJECTION, SOLUTION EPIDURAL; INFILTRATION; INTRACAUDAL; PERINEURAL AS NEEDED
Status: DISCONTINUED | OUTPATIENT
Start: 2022-05-12 | End: 2022-05-12 | Stop reason: HOSPADM

## 2022-05-12 RX ORDER — PROPOFOL 10 MG/ML
INJECTION, EMULSION INTRAVENOUS AS NEEDED
Status: DISCONTINUED | OUTPATIENT
Start: 2022-05-12 | End: 2022-05-12 | Stop reason: HOSPADM

## 2022-05-12 RX ORDER — FENTANYL CITRATE 50 UG/ML
50 INJECTION, SOLUTION INTRAMUSCULAR; INTRAVENOUS
Status: DISCONTINUED | OUTPATIENT
Start: 2022-05-12 | End: 2022-05-12 | Stop reason: HOSPADM

## 2022-05-12 RX ORDER — ATROPA BELLADONNA AND OPIUM 16.2; 6 MG/1; MG/1
1 SUPPOSITORY RECTAL ONCE
Status: DISCONTINUED | OUTPATIENT
Start: 2022-05-12 | End: 2022-05-12 | Stop reason: HOSPADM

## 2022-05-12 RX ORDER — CEFAZOLIN SODIUM 1 G/3ML
INJECTION, POWDER, FOR SOLUTION INTRAMUSCULAR; INTRAVENOUS AS NEEDED
Status: DISCONTINUED | OUTPATIENT
Start: 2022-05-12 | End: 2022-05-12 | Stop reason: HOSPADM

## 2022-05-12 RX ORDER — FENTANYL CITRATE 50 UG/ML
INJECTION, SOLUTION INTRAMUSCULAR; INTRAVENOUS AS NEEDED
Status: DISCONTINUED | OUTPATIENT
Start: 2022-05-12 | End: 2022-05-12 | Stop reason: HOSPADM

## 2022-05-12 RX ORDER — HYDROMORPHONE HYDROCHLORIDE 1 MG/ML
0.4 INJECTION, SOLUTION INTRAMUSCULAR; INTRAVENOUS; SUBCUTANEOUS
Status: DISCONTINUED | OUTPATIENT
Start: 2022-05-12 | End: 2022-05-12 | Stop reason: HOSPADM

## 2022-05-12 RX ORDER — SODIUM CHLORIDE 0.9 % (FLUSH) 0.9 %
5-40 SYRINGE (ML) INJECTION EVERY 8 HOURS
Status: DISCONTINUED | OUTPATIENT
Start: 2022-05-12 | End: 2022-05-12 | Stop reason: HOSPADM

## 2022-05-12 RX ORDER — NORETHINDRONE AND ETHINYL ESTRADIOL 0.5-0.035
5 KIT ORAL AS NEEDED
Status: DISCONTINUED | OUTPATIENT
Start: 2022-05-12 | End: 2022-05-12 | Stop reason: HOSPADM

## 2022-05-12 RX ORDER — SODIUM CHLORIDE, SODIUM LACTATE, POTASSIUM CHLORIDE, CALCIUM CHLORIDE 600; 310; 30; 20 MG/100ML; MG/100ML; MG/100ML; MG/100ML
INJECTION, SOLUTION INTRAVENOUS
Status: DISCONTINUED | OUTPATIENT
Start: 2022-05-12 | End: 2022-05-12 | Stop reason: HOSPADM

## 2022-05-12 RX ORDER — ONDANSETRON 2 MG/ML
INJECTION INTRAMUSCULAR; INTRAVENOUS AS NEEDED
Status: DISCONTINUED | OUTPATIENT
Start: 2022-05-12 | End: 2022-05-12 | Stop reason: HOSPADM

## 2022-05-12 RX ORDER — LIDOCAINE HYDROCHLORIDE 10 MG/ML
0.1 INJECTION, SOLUTION EPIDURAL; INFILTRATION; INTRACAUDAL; PERINEURAL AS NEEDED
Status: DISCONTINUED | OUTPATIENT
Start: 2022-05-12 | End: 2022-05-12 | Stop reason: HOSPADM

## 2022-05-12 RX ORDER — DIPHENHYDRAMINE HYDROCHLORIDE 50 MG/ML
12.5 INJECTION, SOLUTION INTRAMUSCULAR; INTRAVENOUS AS NEEDED
Status: DISCONTINUED | OUTPATIENT
Start: 2022-05-12 | End: 2022-05-12 | Stop reason: HOSPADM

## 2022-05-12 RX ORDER — KETOROLAC TROMETHAMINE 10 MG/1
10 TABLET, FILM COATED ORAL
Qty: 20 TABLET | Refills: 0 | Status: ON HOLD | OUTPATIENT
Start: 2022-05-12 | End: 2022-10-13 | Stop reason: CLARIF

## 2022-05-12 RX ORDER — SODIUM CHLORIDE, SODIUM LACTATE, POTASSIUM CHLORIDE, CALCIUM CHLORIDE 600; 310; 30; 20 MG/100ML; MG/100ML; MG/100ML; MG/100ML
20 INJECTION, SOLUTION INTRAVENOUS CONTINUOUS
Status: DISCONTINUED | OUTPATIENT
Start: 2022-05-12 | End: 2022-05-12 | Stop reason: HOSPADM

## 2022-05-12 RX ORDER — OXYCODONE AND ACETAMINOPHEN 5; 325 MG/1; MG/1
1 TABLET ORAL AS NEEDED
Status: DISCONTINUED | OUTPATIENT
Start: 2022-05-12 | End: 2022-05-12 | Stop reason: HOSPADM

## 2022-05-12 RX ORDER — SODIUM CHLORIDE 0.9 % (FLUSH) 0.9 %
5-40 SYRINGE (ML) INJECTION AS NEEDED
Status: DISCONTINUED | OUTPATIENT
Start: 2022-05-12 | End: 2022-05-12 | Stop reason: HOSPADM

## 2022-05-12 RX ORDER — MORPHINE SULFATE 10 MG/ML
2 INJECTION, SOLUTION INTRAMUSCULAR; INTRAVENOUS
Status: DISCONTINUED | OUTPATIENT
Start: 2022-05-12 | End: 2022-05-12 | Stop reason: HOSPADM

## 2022-05-12 RX ORDER — MIDAZOLAM HYDROCHLORIDE 1 MG/ML
INJECTION, SOLUTION INTRAMUSCULAR; INTRAVENOUS AS NEEDED
Status: DISCONTINUED | OUTPATIENT
Start: 2022-05-12 | End: 2022-05-12 | Stop reason: HOSPADM

## 2022-05-12 RX ORDER — DEXAMETHASONE SODIUM PHOSPHATE 4 MG/ML
INJECTION, SOLUTION INTRA-ARTICULAR; INTRALESIONAL; INTRAMUSCULAR; INTRAVENOUS; SOFT TISSUE AS NEEDED
Status: DISCONTINUED | OUTPATIENT
Start: 2022-05-12 | End: 2022-05-12 | Stop reason: HOSPADM

## 2022-05-12 RX ADMIN — SODIUM CHLORIDE, POTASSIUM CHLORIDE, SODIUM LACTATE AND CALCIUM CHLORIDE 20 ML/HR: 600; 310; 30; 20 INJECTION, SOLUTION INTRAVENOUS at 09:37

## 2022-05-12 RX ADMIN — ONDANSETRON 4 MG: 2 INJECTION INTRAMUSCULAR; INTRAVENOUS at 10:17

## 2022-05-12 RX ADMIN — FENTANYL CITRATE 50 MCG: 50 INJECTION, SOLUTION INTRAMUSCULAR; INTRAVENOUS at 11:02

## 2022-05-12 RX ADMIN — OXYCODONE AND ACETAMINOPHEN 1 TABLET: 5; 325 TABLET ORAL at 12:02

## 2022-05-12 RX ADMIN — PROPOFOL 150 MG: 10 INJECTION, EMULSION INTRAVENOUS at 10:08

## 2022-05-12 RX ADMIN — PHENYLEPHRINE HYDROCHLORIDE 100 MCG: 10 INJECTION INTRAVENOUS at 10:27

## 2022-05-12 RX ADMIN — LIDOCAINE HYDROCHLORIDE 100 MG: 20 INJECTION, SOLUTION EPIDURAL; INFILTRATION; INTRACAUDAL; PERINEURAL at 10:08

## 2022-05-12 RX ADMIN — CEFAZOLIN SODIUM 2 G: 1 INJECTION, POWDER, FOR SOLUTION INTRAMUSCULAR; INTRAVENOUS at 10:18

## 2022-05-12 RX ADMIN — FENTANYL CITRATE 50 MCG: 50 INJECTION, SOLUTION INTRAMUSCULAR; INTRAVENOUS at 10:08

## 2022-05-12 RX ADMIN — FENTANYL CITRATE 25 MCG: 50 INJECTION, SOLUTION INTRAMUSCULAR; INTRAVENOUS at 10:27

## 2022-05-12 RX ADMIN — SODIUM CHLORIDE, POTASSIUM CHLORIDE, SODIUM LACTATE AND CALCIUM CHLORIDE: 600; 310; 30; 20 INJECTION, SOLUTION INTRAVENOUS at 10:03

## 2022-05-12 RX ADMIN — MIDAZOLAM HYDROCHLORIDE 2 MG: 2 INJECTION, SOLUTION INTRAMUSCULAR; INTRAVENOUS at 10:02

## 2022-05-12 RX ADMIN — DEXAMETHASONE SODIUM PHOSPHATE 4 MG: 4 INJECTION, SOLUTION INTRA-ARTICULAR; INTRALESIONAL; INTRAMUSCULAR; INTRAVENOUS; SOFT TISSUE at 10:11

## 2022-05-12 RX ADMIN — FENTANYL CITRATE 25 MCG: 50 INJECTION, SOLUTION INTRAMUSCULAR; INTRAVENOUS at 10:37

## 2022-05-12 NOTE — PERIOP NOTES
TRANSFER - OUT REPORT:    Verbal report given to ROLANDO Guerrero(name) on Garrett Lias  being transferred to [place] [id number](unit) for routine post - op       Report consisted of patients Situation, Background, Assessment and   Recommendations(SBAR). Information from the following report(s) SBAR, Procedure Summary, Intake/Output and MAR was reviewed with the receiving nurse. Opportunity for questions and clarification was provided.       Patient transported with:   Registered Nurse

## 2022-05-12 NOTE — DISCHARGE INSTRUCTIONS
Patient Education        Cystoscopy: What to Expect at [facility]     A cystoscopy is a procedure that lets a doctor look inside of the bladder and the urethra. The urethra is the tube that carries urine from the bladder to outside the body. The doctor uses a thin, lighted tool called a cystoscope. Your bladder is filled with fluid. This stretches the bladder so that your doctor can look closely at the inside of your bladder. After the cystoscopy, your urethra may be sore at first, and it may burn when you urinate for the first few days after the procedure. You may feel the need to urinate more often, and your urine may be pink. These symptoms should get better in 1 or 2 days. You will probably be able to go back to most of your usual activities in 1 or 2 days. This care sheet gives you a general idea about how long it will take for you to recover. But each person recovers at a different pace. Follow the steps below to get better as quickly as possible. How can you care for yourself at home? Activity    · Rest when you feel tired. Getting enough sleep will help you recover.     · Try to walk each day. Start by walking a little more than you did the day before. Bit by bit, increase the amount you walk. Walking boosts blood flow and helps prevent pneumonia and constipation.     · Avoid strenuous activities, such as bicycle riding, jogging, weight lifting, or aerobic exercise, until your doctor says it is okay.     · Ask your doctor when you can drive again.     · Most people are able to return to work within 1 or 2 days after the procedure.     · You may shower and take baths as usual.     · Ask your doctor when it is okay for you to have sex. Diet    · You can eat your normal diet. If your stomach is upset, try bland, low-fat foods like plain rice, broiled chicken, toast, and yogurt.     · Drink plenty of fluids (unless your doctor tells you not to).    Medicines    · Take pain medicines exactly as directed. ? If the doctor gave you a prescription medicine for pain, take it as prescribed. ? If you are not taking a prescription pain medicine, ask your doctor if you can take an over-the-counter medicine.     · If you think your pain medicine is making you sick to your stomach:  ? Take your medicine after meals (unless your doctor has told you not to). ? Ask your doctor for a different pain medicine.     · If your doctor prescribed antibiotics, take them as directed. Do not stop taking them just because you feel better. You need to take the full course of antibiotics. Follow-up care is a key part of your treatment and safety. Be sure to make and go to all appointments, and call your doctor if you are having problems. It's also a good idea to know your test results and keep a list of the medicines you take. When should you call for help? Call 911 anytime you think you may need emergency care. For example, call if:    · You passed out (lost consciousness).     · You have severe trouble breathing.     · You have sudden chest pain and shortness of breath, or you cough up blood.     · You have severe belly pain. Call your doctor now or seek immediate medical care if:    · You are sick to your stomach or cannot keep fluids down.     · Your urine is still red or you see blood clots after you have urinated several times.     · You have trouble passing urine or stool, especially if you have pain or swelling in your lower belly.     · You have signs of a blood clot, such as:  ? Pain in your calf, back of the knee, thigh, or groin. ? Redness and swelling in your leg or groin.     · You develop a fever or severe chills.     · You have pain in your back just below your rib cage. This is called flank pain. Watch closely for changes in your health, and be sure to contact your doctor if:    · You have pain or burning when you urinate.  A burning feeling is normal for a day or two after the test, but call if it does not get better.     · You have a frequent urge to urinate but can pass only small amounts of urine.     · Your urine is pink, red, or cloudy, or smells bad. It is normal for the urine to have a pinkish color for a few days after the test, but call if it does not get better. Where can you learn more? Go to http://www.gray.com/  Enter C842 in the search box to learn more about \"Cystoscopy: What to Expect at Home. \"  Current as of: October 18, 2021               Content Version: 13.2  © 2006-2022 [employer]. Care instructions adapted under license by [employer] (which disclaims liability or warranty for this information). If you have questions about a medical condition or this instruction, always ask your healthcare professional. [address] any warranty or liability for your use of this information. Patient Education   Patient Education        Intravenous Pyelogram (IVP): About This Test  What is it? An intravenous pyelogram (IVP) is an X-ray test that provides pictures of the kidneys, the bladder, and the ureters (urinary tract). During IVP, a dye called contrast material is injected into a vein in your arm. A series of X-ray pictures are then taken at timed intervals. Why is this test done? An IVP is done to:  · Identify diseases of the urinary tract, such as kidney stones, tumors, or infection. · Look for problems with the structure of the urinary tract. · Find the cause of blood in the urine. · Look for damage to the urinary tract after an injury. · Find the cause of urinary tract infections that keep coming back. · Find the cause of ongoing back or flank pain. How do you prepare for the test?  Tell your doctor if:  · You are allergic to the iodine dye used as the contrast material for X-ray tests or to anything else that contains iodine.   · You have ever had a serious allergic reaction (anaphylaxis), such as after being stung by a bee or from eating shellfish. · You are breastfeeding. You may want to pump enough breast milk before the test to get through 1 to 2 days of feeding. The contrast material used in this test can get into your breast milk. How is the test done? · You will lie on your back on an X-ray table. An X-ray picture of your belly will be taken. The doctor will review it before the next part of the test starts. · The injection site on your arm will be cleaned. The dye will be injected into a vein. The dye travels through the bloodstream, is filtered out by the kidneys, and passes into the urine. The urine then flows into the tubes (ureters) that lead to the bladder. · X-ray pictures are taken several minutes apart as the dye goes through the urinary tract. Each picture is looked at right away. Sometimes more pictures are taken based on earlier ones. You may be asked to turn from side to side or to hold several different positions so the doctor can take a complete series of X-rays. · A special type of X-ray technique called fluoroscopy may also be used during IVP. What are the risks of an intravenous pyelogram (IVP)? There is a risk of damage to cells or tissue from being exposed to radiation, including the small amounts used in CTs, X-rays, and other medical tests. Over time, exposure to radiation may cause cancer and other health problems. But in most cases, the risk of getting cancer from being exposed to small amounts of radiation is low. It is not a reason to avoid these tests for most people. There is slight risk of having an allergic reaction to the contrast material.  How long does the test take? The test will take about an hour. What happens after the test?  Drink plenty of liquids to help flush the dye out of your body. Follow-up care is a key part of your treatment and safety. Be sure to make and go to all appointments, and call your doctor if you are having problems.  It's also a good idea to keep a list of the medicines you take. Ask your doctor when you can expect to have your test results. Where can you learn more? Go to http://www.gray.com/  Enter B279 in the search box to learn more about \"Intravenous Pyelogram (IVP): About This Test.\"  Current as of: September 8, 2021               Content Version: 13.2  © 2006-2022 [employer]. Care instructions adapted under license by [employer] (which disclaims liability or warranty for this information). If you have questions about a medical condition or this instruction, always ask your healthcare professional. [place] [address] any warranty or liability for your use of this information. Learning About General Anesthesia  What is general anesthesia? Anesthesia helps control pain during surgery or other procedures. General anesthesia affects your whole body. It uses medicines that make you unconscious. It also causes you to forget things for a short time. And it relaxes your muscles. When it's used, you should be completely unaware. You won't feel pain during the surgery or procedure. General anesthesia reduces many of your body's normal functions, such as those that control breathing. So the anesthesia specialist will watch you closely during the surgery. How is it given? Anesthesia medicine may be given through a needle in a vein (intravenous, or IV.) Or it may be inhaled. Sometimes it's given both ways. During the procedure, an anesthesia specialist closely watches your vital signs and keeps them stable by adjusting the medicines as needed. Vital signs include your heartbeat, breathing, and blood pressure. What are the risks? Major side effects are not common. But all types of anesthesia have some risk. Your risk depends on your overall health. It also depends on the type of anesthesia you have and how you respond to it.   Serious but rare risks include breathing problems, heart problems, and stroke. Malignant hyperthermia is a serious reaction that can occur very rarely with some anesthesia medicines. It can be deadly. The chance of having this reaction may be passed down in families. Some health conditions increase the risk of problems. Your anesthesia specialist will ask you about any health problems you have. You will discuss things that can increase your risk. These include sleep apnea, obesity, and heart and lung disease. What can you do to prepare? You will get a list of instructions to help you prepare. Your anesthesia specialist will let you know what to expect when you get to the hospital, during the surgery, and after. You'll be told when to stop eating and drinking. If you take medicine, you'll be told what you can and can't take before surgery. Your anesthesia specialist will talk with you about the risks and benefits of anesthesia. If you have questions, be sure to ask. Many people are nervous before they have anesthesia and surgery. Ask your doctor about ways to relax ahead of time. Relaxation exercises may be one option. What can you expect after having general anesthesia? Right after the surgery, you will be in the recovery room. Nurses will make sure you are comfortable. As the anesthesia wears off, you may feel some pain and discomfort from your surgery. Tell someone on your care team if you have pain. Pain medicine works better if you take it before the pain gets bad. When you first wake up from the anesthesia, you may be confused. It may be hard to think clearly. This is normal. It takes time for the effects of the medicine to completely wear off. Wait 24 hours before you drive or operate heavy machinery, make important decisions, go to work or school, or sign legal documents. Other common side effects of anesthesia include:  · Nausea and vomiting. This usually won't last long. You can take medicine to treat it.   · A slight drop in body temperature. You may feel cold and shiver when you first wake up. · A sore throat. · Muscle aches or weakness. · Feeling tired. For minor surgeries, you may go home the same day. For other surgeries, you may stay in the hospital. Your anesthesia specialist will check on you as you recover from the anesthesia. They can answer any questions you may have. Follow-up care is a key part of your treatment and safety. Be sure to make and go to all appointments, and call your doctor if you are having problems. It's also a good idea to know your test results and keep a list of the medicines you take. Where can you learn more? Go to http://www.gray.com/  Enter V817 in the search box to learn more about \"Learning About General Anesthesia. \"  Current as of: June 23, 2021               Content Version: 13.2  © 0782-3377 [employer]. Care instructions adapted under license by [employer] (which disclaims liability or warranty for this information). If you have questions about a medical condition or this instruction, always ask your healthcare professional. [address] any warranty or liability for your use of this information.

## 2022-05-12 NOTE — ANESTHESIA PREPROCEDURE EVALUATION
Relevant Problems   RENAL FAILURE   (+) Hydronephrosis   (+) Kidney infection   (+) Renal stone       Anesthetic History   No history of anesthetic complications            Review of Systems / Medical History  Patient summary reviewed, nursing notes reviewed and pertinent labs reviewed    Pulmonary          Smoker      Comments: Snoring. Neuro/Psych   Within defined limits           Cardiovascular  Within defined limits                Exercise tolerance: >4 METS     GI/Hepatic/Renal         Renal disease: stones      Comments: Flank pain 5/10. Hematuria. Nausea. Endo/Other        Obesity     Other Findings          Past Medical History:   Diagnosis Date    Kidney calculi        Past Surgical History:   Procedure Laterality Date    HX  SECTION      HX LITHOTRIPSY  2017    \"multiple times but  was most recent\"    HX LITHOTRIPSY      HX TUBAL LIGATION      HX UROLOGICAL  10/12/2021    CYSTOSCOPY, URETEROSCOPY WITH LASER LITHOTRIPSY,     HX UROLOGICAL  10/12/2021    BILATERAL RETROGRADE PYELOGRAM;     HX UROLOGICAL  10/12/2021    LEFT URETERAL STENT PLACEMENT     HX UROLOGICAL  10/27/2021    cystoscopy stent remove    HX UROLOGICAL  2022    cystoscopy       Current Outpatient Medications   Medication Instructions    ondansetron hcl (ZOFRAN) 4 mg tablet No dose, route, or frequency recorded.  trimethoprim-sulfamethoxazole (BACTRIM DS, SEPTRA DS) 160-800 mg per tablet 1 Tablet, Oral, 2 TIMES DAILY       No current outpatient medications on file. No current facility-administered medications for this visit. No data found.     Lab Results   Component Value Date/Time    WBC 11.9 (H) 2022 03:48 PM    HGB 13.1 2022 03:48 PM    HCT 40.5 2022 03:48 PM    PLATELET 776  03:48 PM    MCV 87.3 2022 03:48 PM     Lab Results   Component Value Date/Time    Sodium 138 2022 03:48 PM    Potassium 3.7 2022 03:48 PM    Chloride 108 05/02/2022 03:48 PM    CO2 27 05/02/2022 03:48 PM    Anion gap 3 (L) 05/02/2022 03:48 PM    Glucose 94 05/02/2022 03:48 PM    BUN 13 05/02/2022 03:48 PM    Creatinine 1.03 (H) 05/02/2022 03:48 PM    BUN/Creatinine ratio 13 05/02/2022 03:48 PM    GFR est AA >60 05/02/2022 03:48 PM    GFR est non-AA >60 05/02/2022 03:48 PM    Calcium 9.6 05/02/2022 03:48 PM     No results found for: APTT, PTP, INR, INREXT, INREXT  Lab Results   Component Value Date/Time    Glucose 94 05/02/2022 03:48 PM     Physical Exam    Airway  Mallampati: II  TM Distance: 4 - 6 cm  Neck ROM: normal range of motion   Mouth opening: Normal     Cardiovascular    Rhythm: regular  Rate: normal         Dental  No notable dental hx       Pulmonary  Breath sounds clear to auscultation               Abdominal         Other Findings            Anesthetic Plan    ASA: 2  Anesthesia type: general          Induction: Intravenous  Anesthetic plan and risks discussed with: Patient and Family

## 2022-05-12 NOTE — ANESTHESIA POSTPROCEDURE EVALUATION
Procedure(s):  CYSTOURETHROSCOPY WITH right ureteroscopy, RETROGRADE PYELOGRAM .    general    Anesthesia Post Evaluation        Patient location during evaluation: PACU  Patient participation: complete - patient participated  Level of consciousness: awake and alert  Pain score: 0  Pain management: adequate  Airway patency: patent  Anesthetic complications: no  Cardiovascular status: acceptable  Respiratory status: spontaneous ventilation  Hydration status: acceptable  Post anesthesia nausea and vomiting:  controlled  Final Post Anesthesia Temperature Assessment:  Normothermia (36.0-37.5 degrees C)      INITIAL Post-op Vital signs:   Vitals Value Taken Time   /76 05/12/22 1110   Temp 36.7 °C (98.1 °F) 05/12/22 1042   Pulse 69 05/12/22 1110   Resp 18 05/12/22 1110   SpO2 98 % 05/12/22 1110

## 2022-05-12 NOTE — OP NOTES
Right retrograde, right diagnostic ureteroscopy  Preop diagnosis severe right flank pain with hematuria and pyuria with negative cultures  Postop diagnosis same  Surgeon-Amado  Anesthesia-General  Complication-without  Assistant-none  EBL-none  Indication-patient with severe right flank pain persistent, had negative CT scan was some small nonobstructing stones and kidney patient set up for evaluation of the right ureter and kidney. She is aware the risk of bleeding infection into the bladder urethra ureter vascular injury pulm embolus and death she has no questions  Procedure patient was prepped and draped in usual fashion after undergoing general anesthesia and placed in position. Timeout was performed. Preoperative antibiotics were given. SCDs were placed        Patient was cystoscope with a 21 [place] rigid scope. Had normal appearing urethra normal-appearing bladder normal ureteral orifices bilateral no stones or tumors appreciated in the bladder. Had a large right ureteral orifice. I then switched out the scope was able to rigid flex ureteroscope up the right ureter without difficulty all the way to the kidney no stones no tumors nothing appreciated ureter at all.   In the kidney renal pelvis look normal.  Shot retrograde showed no filling defects no sign of obstruction nothing suspicious I then removed the scope into the bladder took off the table cover area without complication-

## 2022-05-14 LAB — BACTERIA UR CULT: NORMAL

## 2022-05-24 ENCOUNTER — TELEPHONE (OUTPATIENT)
Dept: UROLOGY | Age: 37
End: 2022-05-24

## 2022-06-05 NOTE — PROGRESS NOTES
HISTORY OF PRESENT ILLNESS  Kamla Llamas is a 39 y.o. female is here for follow up after right diagnostic ureteroscopy. She reports feeling better She denies N/V,chills, gross hematuria, frequency or urgency. She has intermittent right flank pain which comes and goes. Right diagnostic ureteroscopy did not reveal cause  of pain     History:  She had gross hematuria, was treated for pylenephritis  She is s/p  22 Right retrograde, right diagnostic ureteroscopy due to   severe right flank pain with hematuria and pyuria with negative cultures    Findings:  Had normal appearing urethra normal-appearing bladder normal ureteral orifices bilateral no stones or tumors appreciated in the bladder. Had a large right ureteral orifice. went thru  the right ureter without difficulty all the way to the kidney no stones no tumors nothing appreciated ureter at all. In the kidney renal pelvis look normal.  Shot retrograde showed no filling defects no sign of obstruction nothing suspicious       Past Medical   History:  PMHx (including negatives):  has a past medical history of Kidney calculi. PSurgHx:  has a past surgical history that includes hx  section (); hx tubal ligation (); hx lithotripsy (); hx lithotripsy; hx urological (10/12/2021); hx urological (10/12/2021); hx urological (10/12/2021); hx urological (10/27/2021); hx urological (2022); hx other surgical (); hx urological (2022); and hx urological (Right, 2022). PSocHx:  reports that she has been smoking. She has a 5.00 pack-year smoking history. She uses smokeless tobacco. She reports that she does not drink alcohol and does not use drugs. Chronic Conditions Addressed Today     1.  Hydronephrosis     Overview      The patient had moderate obstruction of the left kidney secondary to a mid left ureteral stone measuring 9 x 13 mm.    10/12/21-LEFT ureteroscopy with laser lithotripsy and stone basketing, left RPG, and left ureteral stent placement           2. Pyelonephritis     Overview      Ct scan from 5/2/2022  Stone content bilateral kidneys; multiple stones present through each upper  collecting system ranging up to 7-8 mm. No hydronephrosis. 5/3/22-Right Flank pain radiating to her right groin area and her lower  Abdomen. She still has gross hematuria. She has right flank pain with palpation. She denies any symptoms of fever,chills, N/V. She was given bactrim for 10 days. 3. Right flank pain - Primary     Overview      5/3/22- right  Flank pain radiating to her right groin area and her lower  Abdomen. She still has gross hematuria. She has right flank pain with palpation. She denies any symptoms of fever,chills, N/V.         4. Gross hematuria     Overview      5/3/22 c/o of gross hematuria  for the past 2 weeks She has gross hematuria with each void. She denies any frequency,urgency,flank pain,N/V, abdominal pain or fever. She noemi seeing any stones in her urine She reports having sharp pain a day ago on her right site, but as soon as she voided her pain was gone ( it just happened once). .   She has an IUD placed one year ago, and reports she does not bleed or has any spotting. She just went for a check up appointment with her OBGYN and there was no problems. Review of Systems   Constitutional: Negative. HENT: Negative. Eyes: Negative. Respiratory: Negative. Cardiovascular: Negative. Gastrointestinal: Negative. Genitourinary: Negative. Musculoskeletal: Negative. Skin: Negative. Neurological: Negative. Endo/Heme/Allergies: Negative. Psychiatric/Behavioral: Negative. Patient denies the symptoms of COVID-19 per routine screening guidelines. Home Medications    Medication Sig Start Date End Date Taking? Authorizing Provider   ketorolac (TORADOL) 10 mg tablet Take 1 Tablet by mouth every six (6) hours as needed for Pain.  5/12/22   Steffi Bennett MD ondansetron hcl (ZOFRAN) 4 mg tablet  10/22/21   Provider, Historical      Physical Exam  Vitals and nursing note reviewed. Exam conducted with a chaperone present. Constitutional:       General: She is in no acute distress. Appearance: She is obese. She is not ill-appearing. HENT:      Head: Normocephalic. Nose: Nose normal.      Mouth/Throat:      Mouth: Mucous membranes are moist.   Eyes:      Pupils: Pupils are equal, round, and reactive to light. Cardiovascular:      Rate and Rhythm: Normal rate. Pulses: Normal pulses. Pulmonary:      Effort: Pulmonary effort is normal.   Abdominal:      General: Abdomen is flat. Palpations: Abdomen is soft. Tenderness: There is no right CVA tenderness. Genitourinary:     Comments: Deferred  Musculoskeletal:         General: Normal range of motion. Cervical back: Normal range of motion. Skin:     General: Skin is warm. Neurological:      General: No focal deficit present. Mental Status: She is alert and oriented to person, place, and time. Psychiatric:         Mood and Affect: Mood normal.         Behavior: Behavior normal.         Thought Content: Thought content normal.         Judgment: Judgment normal.   ASSESSMENT and PLAN  Diagnoses and all orders for this visit:    1. Right flank pain    2. Gross hematuria    3. Pyelonephritis    4. Hydronephrosis, unspecified hydronephrosis type           See her back in 6 months or as needed      Jero Carlosryan may have a reminder for a \"due or due soon\" health maintenance. The patient has been encouraged to contact their primary care provider for follow-up on this health maintenance or other necessary and/or routine health screening.

## 2022-06-08 ENCOUNTER — OFFICE VISIT (OUTPATIENT)
Dept: UROLOGY | Age: 37
End: 2022-06-08
Payer: MEDICAID

## 2022-06-08 VITALS
WEIGHT: 220 LBS | HEIGHT: 65 IN | BODY MASS INDEX: 36.65 KG/M2 | OXYGEN SATURATION: 100 % | DIASTOLIC BLOOD PRESSURE: 81 MMHG | TEMPERATURE: 97.8 F | SYSTOLIC BLOOD PRESSURE: 148 MMHG | HEART RATE: 74 BPM

## 2022-06-08 DIAGNOSIS — N12 PYELONEPHRITIS: ICD-10-CM

## 2022-06-08 DIAGNOSIS — R31.0 GROSS HEMATURIA: ICD-10-CM

## 2022-06-08 DIAGNOSIS — N13.30 HYDRONEPHROSIS, UNSPECIFIED HYDRONEPHROSIS TYPE: ICD-10-CM

## 2022-06-08 DIAGNOSIS — R10.9 RIGHT FLANK PAIN: Primary | ICD-10-CM

## 2022-06-08 LAB
BILIRUB UR QL: NEGATIVE
GLUCOSE UR-MCNC: NEGATIVE MG/DL
KETONES P FAST UR STRIP-MCNC: NEGATIVE MG/DL
PH UR STRIP: 6.5 [PH] (ref 4.6–8)
PROT UR QL STRIP: NEGATIVE
SP GR UR STRIP: 1.02 (ref 1–1.03)
UA UROBILINOGEN AMB POC: NORMAL (ref 0.2–1)
URINALYSIS CLARITY POC: CLEAR
URINALYSIS COLOR POC: YELLOW
URINE BLOOD POC: NEGATIVE
URINE LEUKOCYTES POC: NORMAL
URINE NITRITES POC: NEGATIVE

## 2022-06-08 PROCEDURE — 81003 URINALYSIS AUTO W/O SCOPE: CPT | Performed by: UROLOGY

## 2022-06-08 PROCEDURE — 99213 OFFICE O/P EST LOW 20 MIN: CPT | Performed by: UROLOGY

## 2022-06-08 NOTE — PROGRESS NOTES
Chief Complaint   Patient presents with    Follow-up     ros puff    Flank Pain     pain has improved    Gross Hematuria     has not  seen any blood        PHQ-9 score is    Negative    Vitals:    06/08/22 1100   BP: (!) 148/81   Pulse: 74   Temp: 97.8 °F (36.6 °C)   TempSrc: Temporal   SpO2: 100%   Weight: 220 lb (99.8 kg)   Height: 5' 5\" (1.651 m)   PainSc:   0 - No pain      1. \"Have you been to the ER, urgent care clinic since your last visit? Hospitalized since your last visit? \" No    2. \"Have you seen or consulted any other health care providers outside of the [address] [place] [address] since your last visit? \" No     3. For patients aged 39-70: Has the patient had a colonoscopy / FIT/ Cologuard? No      If the patient is female:    4. For patients aged 41-77: Has the patient had a mammogram within the past 2 years? No      5. For patients aged 21-65: Has the patient had a pap smear?  No

## 2022-10-06 ENCOUNTER — TELEPHONE (OUTPATIENT)
Dept: UROLOGY | Age: 37
End: 2022-10-06

## 2022-10-06 NOTE — TELEPHONE ENCOUNTER
Pt stated she has been having pain In her right side for two days now and she thinks she has a kidney stone.  She has also is seeing blood in her urine and wanted to know if she could come in to see Dr Alvaro Koch . She has not done any imaging

## 2022-10-13 ENCOUNTER — HOSPITAL ENCOUNTER (INPATIENT)
Age: 37
LOS: 1 days | Discharge: HOME | DRG: 465 | End: 2022-10-14
Attending: EMERGENCY MEDICINE | Admitting: FAMILY MEDICINE
Payer: MEDICAID

## 2022-10-13 ENCOUNTER — APPOINTMENT (OUTPATIENT)
Dept: CT IMAGING | Age: 37
DRG: 465 | End: 2022-10-13
Attending: EMERGENCY MEDICINE
Payer: MEDICAID

## 2022-10-13 ENCOUNTER — ANESTHESIA (OUTPATIENT)
Dept: SURGERY | Age: 37
DRG: 465 | End: 2022-10-13
Payer: MEDICAID

## 2022-10-13 ENCOUNTER — ANESTHESIA EVENT (OUTPATIENT)
Dept: SURGERY | Age: 37
DRG: 465 | End: 2022-10-13
Payer: MEDICAID

## 2022-10-13 ENCOUNTER — APPOINTMENT (OUTPATIENT)
Dept: GENERAL RADIOLOGY | Age: 37
DRG: 465 | End: 2022-10-13
Attending: UROLOGY
Payer: MEDICAID

## 2022-10-13 DIAGNOSIS — N12 PYELONEPHRITIS: Primary | ICD-10-CM

## 2022-10-13 PROBLEM — N39.0 UTI (URINARY TRACT INFECTION): Status: ACTIVE | Noted: 2022-10-13

## 2022-10-13 PROBLEM — N20.0 KIDNEY STONE: Status: ACTIVE | Noted: 2022-10-13

## 2022-10-13 PROBLEM — N20.0 KIDNEY STONES: Status: ACTIVE | Noted: 2022-10-13

## 2022-10-13 LAB
ALBUMIN SERPL-MCNC: 3.8 G/DL (ref 3.5–5)
ALBUMIN/GLOB SERPL: 0.9 {RATIO} (ref 1.1–2.2)
ALP SERPL-CCNC: 149 U/L (ref 45–117)
ALT SERPL-CCNC: 19 U/L (ref 12–78)
ANION GAP SERPL CALC-SCNC: 5 MMOL/L (ref 5–15)
APPEARANCE UR: ABNORMAL
AST SERPL W P-5'-P-CCNC: 18 U/L (ref 15–37)
BACTERIA URNS QL MICRO: NEGATIVE /HPF
BACTERIA URNS QL MICRO: NEGATIVE /HPF
BASOPHILS # BLD: 0.1 K/UL (ref 0–0.1)
BASOPHILS NFR BLD: 1 % (ref 0–1)
BILIRUB SERPL-MCNC: 0.2 MG/DL (ref 0.2–1)
BILIRUB UR QL: NEGATIVE
BUN SERPL-MCNC: 18 MG/DL (ref 6–20)
BUN/CREAT SERPL: 19 (ref 12–20)
CA-I BLD-MCNC: 9.5 MG/DL (ref 8.5–10.1)
CHLORIDE SERPL-SCNC: 110 MMOL/L (ref 97–108)
CO2 SERPL-SCNC: 24 MMOL/L (ref 21–32)
COLOR UR: ABNORMAL
CREAT SERPL-MCNC: 0.94 MG/DL (ref 0.55–1.02)
DIFFERENTIAL METHOD BLD: NORMAL
EOSINOPHIL # BLD: 0.4 K/UL (ref 0–0.4)
EOSINOPHIL NFR BLD: 4 % (ref 0–7)
ERYTHROCYTE [DISTWIDTH] IN BLOOD BY AUTOMATED COUNT: 13.4 % (ref 11.5–14.5)
GLOBULIN SER CALC-MCNC: 4.3 G/DL (ref 2–4)
GLUCOSE SERPL-MCNC: 104 MG/DL (ref 65–100)
GLUCOSE UR STRIP.AUTO-MCNC: NEGATIVE MG/DL
HCG UR QL: NEGATIVE
HCT VFR BLD AUTO: 44.3 % (ref 35–47)
HGB BLD-MCNC: 14.3 G/DL (ref 11.5–16)
HGB UR QL STRIP: ABNORMAL
IMM GRANULOCYTES # BLD AUTO: 0 K/UL (ref 0–0.04)
IMM GRANULOCYTES NFR BLD AUTO: 0 % (ref 0–0.5)
KETONES UR QL STRIP.AUTO: NEGATIVE MG/DL
LEUKOCYTE ESTERASE UR QL STRIP.AUTO: ABNORMAL
LYMPHOCYTES # BLD: 1.8 K/UL (ref 0.8–3.5)
LYMPHOCYTES NFR BLD: 19 % (ref 12–49)
MCH RBC QN AUTO: 28.4 PG (ref 26–34)
MCHC RBC AUTO-ENTMCNC: 32.3 G/DL (ref 30–36.5)
MCV RBC AUTO: 87.9 FL (ref 80–99)
MONOCYTES # BLD: 0.4 K/UL (ref 0–1)
MONOCYTES NFR BLD: 5 % (ref 5–13)
MUCOUS THREADS URNS QL MICRO: ABNORMAL /LPF
MUCOUS THREADS URNS QL MICRO: ABNORMAL /LPF
NEUTS SEG # BLD: 6.9 K/UL (ref 1.8–8)
NEUTS SEG NFR BLD: 71 % (ref 32–75)
NITRITE UR QL STRIP.AUTO: NEGATIVE
NRBC # BLD: 0 K/UL (ref 0–0.01)
NRBC BLD-RTO: 0 PER 100 WBC
PH UR STRIP: 6 [PH] (ref 5–8)
PLATELET # BLD AUTO: 382 K/UL (ref 150–400)
PMV BLD AUTO: 10.8 FL (ref 8.9–12.9)
POTASSIUM SERPL-SCNC: 4.7 MMOL/L (ref 3.5–5.1)
PROT SERPL-MCNC: 8.1 G/DL (ref 6.4–8.2)
PROT UR STRIP-MCNC: 100 MG/DL
RBC # BLD AUTO: 5.04 M/UL (ref 3.8–5.2)
RBC #/AREA URNS HPF: ABNORMAL /HPF (ref 0–5)
RBC #/AREA URNS HPF: ABNORMAL /HPF (ref 0–5)
SODIUM SERPL-SCNC: 139 MMOL/L (ref 136–145)
SP GR UR REFRACTOMETRY: 1.02 (ref 1–1.03)
UROBILINOGEN UR QL STRIP.AUTO: 0.1 EU/DL (ref 0.1–1)
WBC # BLD AUTO: 9.6 K/UL (ref 3.6–11)
WBC URNS QL MICRO: ABNORMAL /HPF (ref 0–4)
WBC URNS QL MICRO: ABNORMAL /HPF (ref 0–4)

## 2022-10-13 PROCEDURE — 74011000250 HC RX REV CODE- 250: Performed by: REGISTERED NURSE

## 2022-10-13 PROCEDURE — 74011250637 HC RX REV CODE- 250/637: Performed by: UROLOGY

## 2022-10-13 PROCEDURE — 77030040361 HC SLV COMPR DVT MDII -B: Performed by: UROLOGY

## 2022-10-13 PROCEDURE — 81001 URINALYSIS AUTO W/SCOPE: CPT

## 2022-10-13 PROCEDURE — 74011250636 HC RX REV CODE- 250/636: Performed by: FAMILY MEDICINE

## 2022-10-13 PROCEDURE — 74011250636 HC RX REV CODE- 250/636: Performed by: EMERGENCY MEDICINE

## 2022-10-13 PROCEDURE — C1758 CATHETER, URETERAL: HCPCS | Performed by: UROLOGY

## 2022-10-13 PROCEDURE — 2709999900 HC NON-CHARGEABLE SUPPLY: Performed by: UROLOGY

## 2022-10-13 PROCEDURE — 76210000006 HC OR PH I REC 0.5 TO 1 HR: Performed by: UROLOGY

## 2022-10-13 PROCEDURE — 76010000138 HC OR TIME 0.5 TO 1 HR: Performed by: UROLOGY

## 2022-10-13 PROCEDURE — 81025 URINE PREGNANCY TEST: CPT

## 2022-10-13 PROCEDURE — 87040 BLOOD CULTURE FOR BACTERIA: CPT

## 2022-10-13 PROCEDURE — 85025 COMPLETE CBC W/AUTO DIFF WBC: CPT

## 2022-10-13 PROCEDURE — 74011250637 HC RX REV CODE- 250/637: Performed by: FAMILY MEDICINE

## 2022-10-13 PROCEDURE — 74011000250 HC RX REV CODE- 250: Performed by: EMERGENCY MEDICINE

## 2022-10-13 PROCEDURE — 96374 THER/PROPH/DIAG INJ IV PUSH: CPT

## 2022-10-13 PROCEDURE — 52332 CYSTOSCOPY AND TREATMENT: CPT | Performed by: UROLOGY

## 2022-10-13 PROCEDURE — 80053 COMPREHEN METABOLIC PANEL: CPT

## 2022-10-13 PROCEDURE — 65270000029 HC RM PRIVATE

## 2022-10-13 PROCEDURE — 76000 FLUOROSCOPY <1 HR PHYS/QHP: CPT

## 2022-10-13 PROCEDURE — 96375 TX/PRO/DX INJ NEW DRUG ADDON: CPT

## 2022-10-13 PROCEDURE — 76060000032 HC ANESTHESIA 0.5 TO 1 HR: Performed by: UROLOGY

## 2022-10-13 PROCEDURE — 0T778DZ DILATION OF LEFT URETER WITH INTRALUMINAL DEVICE, VIA NATURAL OR ARTIFICIAL OPENING ENDOSCOPIC: ICD-10-PCS | Performed by: UROLOGY

## 2022-10-13 PROCEDURE — 74011250636 HC RX REV CODE- 250/636: Performed by: REGISTERED NURSE

## 2022-10-13 PROCEDURE — 74011250636 HC RX REV CODE- 250/636: Performed by: ANESTHESIOLOGY

## 2022-10-13 PROCEDURE — 94760 N-INVAS EAR/PLS OXIMETRY 1: CPT

## 2022-10-13 PROCEDURE — C1769 GUIDE WIRE: HCPCS | Performed by: UROLOGY

## 2022-10-13 PROCEDURE — C2617 STENT, NON-COR, TEM W/O DEL: HCPCS | Performed by: UROLOGY

## 2022-10-13 PROCEDURE — 99221 1ST HOSP IP/OBS SF/LOW 40: CPT | Performed by: UROLOGY

## 2022-10-13 PROCEDURE — 99285 EMERGENCY DEPT VISIT HI MDM: CPT

## 2022-10-13 PROCEDURE — 74176 CT ABD & PELVIS W/O CONTRAST: CPT

## 2022-10-13 DEVICE — VARIABLE LENGTH URETERAL STENT
Type: IMPLANTABLE DEVICE | Site: URETER | Status: FUNCTIONAL
Brand: CONTOUR VL™

## 2022-10-13 RX ORDER — ATROPA BELLADONNA AND OPIUM 16.2; 6 MG/1; MG/1
SUPPOSITORY RECTAL AS NEEDED
Status: DISCONTINUED | OUTPATIENT
Start: 2022-10-13 | End: 2022-10-13 | Stop reason: HOSPADM

## 2022-10-13 RX ORDER — EPHEDRINE SULFATE/0.9% NACL/PF 50 MG/5 ML
5 SYRINGE (ML) INTRAVENOUS AS NEEDED
Status: DISCONTINUED | OUTPATIENT
Start: 2022-10-13 | End: 2022-10-13 | Stop reason: HOSPADM

## 2022-10-13 RX ORDER — HYDROMORPHONE HYDROCHLORIDE 1 MG/ML
1 INJECTION, SOLUTION INTRAMUSCULAR; INTRAVENOUS; SUBCUTANEOUS ONCE
Status: COMPLETED | OUTPATIENT
Start: 2022-10-13 | End: 2022-10-13

## 2022-10-13 RX ORDER — METHENAMINE HIPPURATE 1000 MG/1
1 TABLET ORAL 2 TIMES DAILY
Status: DISCONTINUED | OUTPATIENT
Start: 2022-10-13 | End: 2022-10-14 | Stop reason: HOSPADM

## 2022-10-13 RX ORDER — ONDANSETRON 4 MG/1
4 TABLET, ORALLY DISINTEGRATING ORAL
Status: DISCONTINUED | OUTPATIENT
Start: 2022-10-13 | End: 2022-10-14 | Stop reason: HOSPADM

## 2022-10-13 RX ORDER — KETOROLAC TROMETHAMINE 30 MG/ML
INJECTION, SOLUTION INTRAMUSCULAR; INTRAVENOUS AS NEEDED
Status: DISCONTINUED | OUTPATIENT
Start: 2022-10-13 | End: 2022-10-13 | Stop reason: HOSPADM

## 2022-10-13 RX ORDER — ATROPA BELLADONNA AND OPIUM 16.2; 6 MG/1; MG/1
1 SUPPOSITORY RECTAL
Status: DISCONTINUED | OUTPATIENT
Start: 2022-10-13 | End: 2022-10-14 | Stop reason: HOSPADM

## 2022-10-13 RX ORDER — SODIUM CHLORIDE 0.9 % (FLUSH) 0.9 %
5-40 SYRINGE (ML) INJECTION EVERY 8 HOURS
Status: DISCONTINUED | OUTPATIENT
Start: 2022-10-13 | End: 2022-10-13 | Stop reason: HOSPADM

## 2022-10-13 RX ORDER — PROPOFOL 10 MG/ML
INJECTION, EMULSION INTRAVENOUS AS NEEDED
Status: DISCONTINUED | OUTPATIENT
Start: 2022-10-13 | End: 2022-10-13 | Stop reason: HOSPADM

## 2022-10-13 RX ORDER — HYDROMORPHONE HYDROCHLORIDE 1 MG/ML
1 INJECTION, SOLUTION INTRAMUSCULAR; INTRAVENOUS; SUBCUTANEOUS
Status: DISCONTINUED | OUTPATIENT
Start: 2022-10-13 | End: 2022-10-14 | Stop reason: HOSPADM

## 2022-10-13 RX ORDER — ONDANSETRON 2 MG/ML
INJECTION INTRAMUSCULAR; INTRAVENOUS AS NEEDED
Status: DISCONTINUED | OUTPATIENT
Start: 2022-10-13 | End: 2022-10-13 | Stop reason: HOSPADM

## 2022-10-13 RX ORDER — ACETAMINOPHEN 650 MG/1
650 SUPPOSITORY RECTAL
Status: DISCONTINUED | OUTPATIENT
Start: 2022-10-13 | End: 2022-10-14 | Stop reason: HOSPADM

## 2022-10-13 RX ORDER — MORPHINE SULFATE 4 MG/ML
4 INJECTION INTRAVENOUS ONCE
Status: COMPLETED | OUTPATIENT
Start: 2022-10-13 | End: 2022-10-13

## 2022-10-13 RX ORDER — LIDOCAINE HYDROCHLORIDE 20 MG/ML
INJECTION, SOLUTION EPIDURAL; INFILTRATION; INTRACAUDAL; PERINEURAL AS NEEDED
Status: DISCONTINUED | OUTPATIENT
Start: 2022-10-13 | End: 2022-10-13 | Stop reason: HOSPADM

## 2022-10-13 RX ORDER — DEXAMETHASONE SODIUM PHOSPHATE 4 MG/ML
INJECTION, SOLUTION INTRA-ARTICULAR; INTRALESIONAL; INTRAMUSCULAR; INTRAVENOUS; SOFT TISSUE AS NEEDED
Status: DISCONTINUED | OUTPATIENT
Start: 2022-10-13 | End: 2022-10-13 | Stop reason: HOSPADM

## 2022-10-13 RX ORDER — ONDANSETRON 2 MG/ML
4 INJECTION INTRAMUSCULAR; INTRAVENOUS
Status: COMPLETED | OUTPATIENT
Start: 2022-10-13 | End: 2022-10-13

## 2022-10-13 RX ORDER — MIDAZOLAM HYDROCHLORIDE 1 MG/ML
INJECTION, SOLUTION INTRAMUSCULAR; INTRAVENOUS AS NEEDED
Status: DISCONTINUED | OUTPATIENT
Start: 2022-10-13 | End: 2022-10-13 | Stop reason: HOSPADM

## 2022-10-13 RX ORDER — SODIUM CHLORIDE 9 MG/ML
INJECTION, SOLUTION INTRAVENOUS
Status: DISCONTINUED | OUTPATIENT
Start: 2022-10-13 | End: 2022-10-13 | Stop reason: HOSPADM

## 2022-10-13 RX ORDER — ACETAMINOPHEN 325 MG/1
650 TABLET ORAL
Status: DISCONTINUED | OUTPATIENT
Start: 2022-10-13 | End: 2022-10-14 | Stop reason: HOSPADM

## 2022-10-13 RX ORDER — SODIUM CHLORIDE 0.9 % (FLUSH) 0.9 %
5-40 SYRINGE (ML) INJECTION AS NEEDED
Status: DISCONTINUED | OUTPATIENT
Start: 2022-10-13 | End: 2022-10-13 | Stop reason: HOSPADM

## 2022-10-13 RX ORDER — KETOROLAC TROMETHAMINE 10 MG/1
10 TABLET, FILM COATED ORAL
Status: DISCONTINUED | OUTPATIENT
Start: 2022-10-13 | End: 2022-10-13 | Stop reason: CLARIF

## 2022-10-13 RX ORDER — POLYETHYLENE GLYCOL 3350 17 G/17G
17 POWDER, FOR SOLUTION ORAL DAILY PRN
Status: DISCONTINUED | OUTPATIENT
Start: 2022-10-13 | End: 2022-10-14 | Stop reason: HOSPADM

## 2022-10-13 RX ORDER — ENOXAPARIN SODIUM 100 MG/ML
30 INJECTION SUBCUTANEOUS 2 TIMES DAILY
Status: DISCONTINUED | OUTPATIENT
Start: 2022-10-13 | End: 2022-10-14 | Stop reason: HOSPADM

## 2022-10-13 RX ORDER — IBUPROFEN 200 MG
400 TABLET ORAL
Status: DISCONTINUED | OUTPATIENT
Start: 2022-10-13 | End: 2022-10-14 | Stop reason: HOSPADM

## 2022-10-13 RX ORDER — FENTANYL CITRATE 50 UG/ML
25 INJECTION, SOLUTION INTRAMUSCULAR; INTRAVENOUS
Status: DISCONTINUED | OUTPATIENT
Start: 2022-10-13 | End: 2022-10-13 | Stop reason: HOSPADM

## 2022-10-13 RX ORDER — ONDANSETRON 2 MG/ML
4 INJECTION INTRAMUSCULAR; INTRAVENOUS
Status: DISCONTINUED | OUTPATIENT
Start: 2022-10-13 | End: 2022-10-14 | Stop reason: HOSPADM

## 2022-10-13 RX ORDER — DIPHENHYDRAMINE HCL 25 MG
25 CAPSULE ORAL
Status: DISCONTINUED | OUTPATIENT
Start: 2022-10-13 | End: 2022-10-14 | Stop reason: HOSPADM

## 2022-10-13 RX ORDER — SODIUM CHLORIDE 9 MG/ML
125 INJECTION, SOLUTION INTRAVENOUS CONTINUOUS
Status: DISCONTINUED | OUTPATIENT
Start: 2022-10-13 | End: 2022-10-14 | Stop reason: HOSPADM

## 2022-10-13 RX ORDER — FENTANYL CITRATE 50 UG/ML
INJECTION, SOLUTION INTRAMUSCULAR; INTRAVENOUS AS NEEDED
Status: DISCONTINUED | OUTPATIENT
Start: 2022-10-13 | End: 2022-10-13 | Stop reason: HOSPADM

## 2022-10-13 RX ORDER — ONDANSETRON 2 MG/ML
4 INJECTION INTRAMUSCULAR; INTRAVENOUS AS NEEDED
Status: DISCONTINUED | OUTPATIENT
Start: 2022-10-13 | End: 2022-10-13 | Stop reason: HOSPADM

## 2022-10-13 RX ORDER — SODIUM CHLORIDE, SODIUM LACTATE, POTASSIUM CHLORIDE, CALCIUM CHLORIDE 600; 310; 30; 20 MG/100ML; MG/100ML; MG/100ML; MG/100ML
100 INJECTION, SOLUTION INTRAVENOUS CONTINUOUS
Status: DISCONTINUED | OUTPATIENT
Start: 2022-10-13 | End: 2022-10-13 | Stop reason: HOSPADM

## 2022-10-13 RX ADMIN — PHENYLEPHRINE HYDROCHLORIDE 100 MCG: 10 INJECTION INTRAVENOUS at 14:41

## 2022-10-13 RX ADMIN — FENTANYL CITRATE 25 MCG: 50 INJECTION INTRAMUSCULAR; INTRAVENOUS at 15:04

## 2022-10-13 RX ADMIN — HYDROMORPHONE HYDROCHLORIDE 1 MG: 1 INJECTION, SOLUTION INTRAMUSCULAR; INTRAVENOUS; SUBCUTANEOUS at 23:44

## 2022-10-13 RX ADMIN — PROPOFOL 200 MG: 10 INJECTION, EMULSION INTRAVENOUS at 14:18

## 2022-10-13 RX ADMIN — HYDROMORPHONE HYDROCHLORIDE 1 MG: 1 INJECTION, SOLUTION INTRAMUSCULAR; INTRAVENOUS; SUBCUTANEOUS at 12:34

## 2022-10-13 RX ADMIN — SODIUM CHLORIDE 125 ML/HR: 9 INJECTION, SOLUTION INTRAVENOUS at 16:17

## 2022-10-13 RX ADMIN — METHENAMINE HIPPURATE 1 G: 1 TABLET ORAL at 20:00

## 2022-10-13 RX ADMIN — IBUPROFEN 400 MG: 200 TABLET, FILM COATED ORAL at 20:00

## 2022-10-13 RX ADMIN — DEXAMETHASONE SODIUM PHOSPHATE 4 MG: 4 INJECTION, SOLUTION INTRA-ARTICULAR; INTRALESIONAL; INTRAMUSCULAR; INTRAVENOUS; SOFT TISSUE at 14:24

## 2022-10-13 RX ADMIN — SODIUM CHLORIDE 1000 ML: 9 INJECTION, SOLUTION INTRAVENOUS at 12:34

## 2022-10-13 RX ADMIN — LIDOCAINE HYDROCHLORIDE 100 MG: 20 INJECTION, SOLUTION EPIDURAL; INFILTRATION; INTRACAUDAL; PERINEURAL at 14:18

## 2022-10-13 RX ADMIN — FENTANYL CITRATE 50 MCG: 0.05 INJECTION, SOLUTION INTRAMUSCULAR; INTRAVENOUS at 14:32

## 2022-10-13 RX ADMIN — ONDANSETRON 4 MG: 2 INJECTION INTRAMUSCULAR; INTRAVENOUS at 14:24

## 2022-10-13 RX ADMIN — CEFTRIAXONE SODIUM 1 G: 1 INJECTION, POWDER, FOR SOLUTION INTRAMUSCULAR; INTRAVENOUS at 12:34

## 2022-10-13 RX ADMIN — ONDANSETRON 4 MG: 2 INJECTION INTRAMUSCULAR; INTRAVENOUS at 10:44

## 2022-10-13 RX ADMIN — MIDAZOLAM HYDROCHLORIDE 2 MG: 2 INJECTION, SOLUTION INTRAMUSCULAR; INTRAVENOUS at 14:13

## 2022-10-13 RX ADMIN — DIPHENHYDRAMINE HYDROCHLORIDE 25 MG: 25 CAPSULE ORAL at 16:17

## 2022-10-13 RX ADMIN — SODIUM CHLORIDE 125 ML/HR: 9 INJECTION, SOLUTION INTRAVENOUS at 23:50

## 2022-10-13 RX ADMIN — FENTANYL CITRATE 25 MCG: 50 INJECTION INTRAMUSCULAR; INTRAVENOUS at 15:10

## 2022-10-13 RX ADMIN — MORPHINE SULFATE 4 MG: 4 INJECTION INTRAVENOUS at 10:44

## 2022-10-13 RX ADMIN — KETOROLAC TROMETHAMINE 30 MG: 30 INJECTION, SOLUTION INTRAMUSCULAR at 14:36

## 2022-10-13 RX ADMIN — SODIUM CHLORIDE: 9 INJECTION, SOLUTION INTRAVENOUS at 14:07

## 2022-10-13 RX ADMIN — PHENYLEPHRINE HYDROCHLORIDE 100 MCG: 10 INJECTION INTRAVENOUS at 14:35

## 2022-10-13 NOTE — PROGRESS NOTES
Reason for Admission:  Kidney Stones                     RUR Score:  N/A                   Plan for utilizing home health:    None @ this time/uses no DME/works @ PCPs office. PCP: First and Last name:  Dilip Gonzales MD     Name of Practice:    Are you a current patient: Yes/No: Yes   Approximate date of last visit: 11/10/22. Can you participate in a virtual visit with your PCP: Yes/Call                    Current Advanced Directive/Advance Care Plan: Full Code      Healthcare Decision Maker:              Primary Decision Maker: Rik Blackman [facility] - [phone]                  Transition of Care Plan:   D/C Plan is home & family to transport home. Send Rxs to [facility] on [employer] [address] upon discharge.

## 2022-10-13 NOTE — PROGRESS NOTES
Problem: Falls - Risk of  Goal: *Absence of Falls  Description: Document Dipika Litter Fall Risk and appropriate interventions in the flowsheet.   Outcome: Progressing Towards Goal  Note: Fall Risk Interventions:                                Problem: Patient Education: Go to Patient Education Activity  Goal: Patient/Family Education  Outcome: Progressing Towards Goal

## 2022-10-13 NOTE — Clinical Note
Status[de-identified] INPATIENT [101]   Type of Bed: Medical [8]   Inpatient Hospitalization Certified Necessary for the Following Reasons: 3.  Patient receiving treatment that can only be provided in an inpatient setting (further clarification in H&P documentation)   Admitting Diagnosis: Kidney stone [289553]   Admitting Diagnosis: UTI (urinary tract infection) [704120]   Admitting Physician: Clari Cornejo [[id number]]   Attending Physician: Clari Cornejo [[id number]]   Estimated Length of Stay: 2 Midnights   Discharge Plan[de-identified] Home with Office Follow-up

## 2022-10-13 NOTE — H&P
History and Physical    NAME: Marissa Solomon   :  1985   MRN:  [id number]     Date/Time:  10/13/2022 12:52 PM    Patient PCP: Susana Gauthier MD  ______________________________________________________________________             Subjective:     CHIEF COMPLAINT:     Left flank pain for last 1 week        HISTORY OF PRESENT ILLNESS:       Patient is a 40y.o. year old female past medical history of renal stone status post lithotripsy came to the ER with 1 week history of left flank pain today getting more worse seen by the ER physician CT scan of the abdomen done Shows  IMPRESSION  1. Obstructing mid left ureteral calculus (7 x 12 mm). Moderate to severe  hydronephrosis.   2. Multiple, additional, nonobstructing, bilateral renal calculi    Patient was admitted urology was consulted    Past Medical History:   Diagnosis Date    Kidney calculi         Past Surgical History:   Procedure Laterality Date    HX  SECTION      HX LITHOTRIPSY  2017    \"multiple times but 2017 was most recent\"    HX LITHOTRIPSY      HX OTHER SURGICAL  2020    intrauterine device (IUD)    HX TUBAL LIGATION      HX UROLOGICAL  10/12/2021    CYSTOSCOPY, URETEROSCOPY WITH LASER LITHOTRIPSY,     HX UROLOGICAL  10/12/2021    BILATERAL RETROGRADE PYELOGRAM;     HX UROLOGICAL  10/12/2021    LEFT URETERAL STENT PLACEMENT     HX UROLOGICAL  10/27/2021    cystoscopy stent remove    HX UROLOGICAL  2022    cystoscopy    HX UROLOGICAL  2022    Right retrograde,     HX UROLOGICAL Right 2022    right diagnostic ureteroscopy       Social History     Tobacco Use    Smoking status: Every Day     Packs/day: 0.50     Years: 10.00     Pack years: 5.00     Types: Cigarettes    Smokeless tobacco: Current   Substance Use Topics    Alcohol use: No     Comment: rarely        Family History   Problem Relation Age of Onset    Uterine Cancer Maternal Grandmother     Diabetes Paternal Grandmother     Liver Cancer Paternal Grandfather Allergies   Allergen Reactions    Ciprofloxacin Other (comments)     Thrush        Prior to Admission medications    Medication Sig Start Date End Date Taking? Authorizing Provider   ketorolac (TORADOL) 10 mg tablet Take 1 Tablet by mouth every six (6) hours as needed for Pain.  5/12/22   Evangelina Donis MD   ondansetron hcl [facility]) 4 mg tablet  10/22/21   Provider, Historical         Current Facility-Administered Medications:     ketorolac (TORADOL) tablet 10 mg, 10 mg, Oral, Q6H PRN, Bry Madrid MD    0.9% sodium chloride infusion, 125 mL/hr, IntraVENous, CONTINUOUS, Bry Madrid MD    acetaminophen (TYLENOL) tablet 650 mg, 650 mg, Oral, Q6H PRN **OR** acetaminophen (TYLENOL) suppository 650 mg, 650 mg, Rectal, Q6H PRN, Imani Madrid MD    polyethylene glycol (MIRALAX) packet 17 g, 17 g, Oral, DAILY PRN, Bry Madrid MD    ondansetron (ZOFRAN ODT) tablet 4 mg, 4 mg, Oral, Q8H PRN **OR** ondansetron (ZOFRAN) injection 4 mg, 4 mg, IntraVENous, Q6H PRN, David Johnston MD    [START ON 10/14/2022] enoxaparin (LOVENOX) injection 40 mg, 40 mg, SubCUTAneous, DAILY, Bry Madrid MD    HYDROmorphone (DILAUDID) injection 1 mg, 1 mg, IntraVENous, Q4H PRN, Imani Madrid MD    cefTRIAXone (ROCEPHIN) 1 g in sterile water (preservative free) 10 mL IV syringe, 1 g, IntraVENous, Q24H, Imani Madrid MD    Current Outpatient Medications:     ketorolac (TORADOL) 10 mg tablet, Take 1 Tablet by mouth every six (6) hours as needed for Pain., Disp: 20 Tablet, Rfl: 0    ondansetron hcl (ZOFRAN) 4 mg tablet, , Disp: , Rfl:     LAB DATA REVIEWED:    Recent Results (from the past 24 hour(s))   URINALYSIS W/ RFLX MICROSCOPIC    Collection Time: 10/13/22  9:44 AM   Result Value Ref Range    Color Yellow/Straw      Appearance Turbid (A) Clear      Specific gravity 1.019 1.003 - 1.030      pH (UA) 6.0 5.0 - 8.0      Protein 100 (A) Negative mg/dL    Glucose Negative Negative mg/dL    Ketone Negative Negative mg/dL    Bilirubin Negative Negative      Blood Moderate (A) Negative      Urobilinogen 0.1 0.1 - 1.0 EU/dL    Nitrites Negative Negative      Leukocyte Esterase Moderate (A) Negative      WBC 20-50 0 - 4 /hpf    RBC 5-10 0 - 5 /hpf    Bacteria Negative Negative /hpf    Mucus Trace /lpf   HCG URINE, QL    Collection Time: 10/13/22  9:44 AM   Result Value Ref Range    HCG urine, QL Negative Negative     URINE MICROSCOPIC    Collection Time: 10/13/22  9:44 AM   Result Value Ref Range    WBC PENDING /hpf    RBC PENDING /hpf    Bacteria PENDING /hpf   CBC WITH AUTOMATED DIFF    Collection Time: 10/13/22  9:55 AM   Result Value Ref Range    WBC 9.6 3.6 - 11.0 K/uL    RBC 5.04 3.80 - 5.20 M/uL    HGB 14.3 11.5 - 16.0 g/dL    HCT 44.3 35.0 - 47.0 %    MCV 87.9 80.0 - 99.0 FL    MCH 28.4 26.0 - 34.0 PG    MCHC 32.3 30.0 - 36.5 g/dL    RDW 13.4 11.5 - 14.5 %    PLATELET 991 215 - 375 K/uL    MPV 10.8 8.9 - 12.9 FL    NRBC 0.0 0.0  WBC    ABSOLUTE NRBC 0.00 0.00 - 0.01 K/uL    NEUTROPHILS 71 32 - 75 %    LYMPHOCYTES 19 12 - 49 %    MONOCYTES 5 5 - 13 %    EOSINOPHILS 4 0 - 7 %    BASOPHILS 1 0 - 1 %    IMMATURE GRANULOCYTES 0 0 - 0.5 %    ABS. NEUTROPHILS 6.9 1.8 - 8.0 K/UL    ABS. LYMPHOCYTES 1.8 0.8 - 3.5 K/UL    ABS. MONOCYTES 0.4 0.0 - 1.0 K/UL    ABS. EOSINOPHILS 0.4 0.0 - 0.4 K/UL    ABS. BASOPHILS 0.1 0.0 - 0.1 K/UL    ABS. IMM.  GRANS. 0.0 0.00 - 0.04 K/UL    DF AUTOMATED     METABOLIC PANEL, COMPREHENSIVE    Collection Time: 10/13/22  9:55 AM   Result Value Ref Range    Sodium 139 136 - 145 mmol/L    Potassium 4.7 3.5 - 5.1 mmol/L    Chloride 110 (H) 97 - 108 mmol/L    CO2 24 21 - 32 mmol/L    Anion gap 5 5 - 15 mmol/L    Glucose 104 (H) 65 - 100 mg/dL    BUN 18 6 - 20 mg/dL    Creatinine 0.94 0.55 - 1.02 mg/dL    BUN/Creatinine ratio 19 12 - 20      eGFR >60 >60 ml/min/1.73m2    Calcium 9.5 8.5 - 10.1 mg/dL    Bilirubin, total 0.2 0.2 - 1.0 mg/dL    AST (SGOT) 18 15 - 37 U/L    ALT (SGPT) 19 12 - 78 U/L    Alk. phosphatase 149 (H) 45 - 117 U/L    Protein, total 8.1 6.4 - 8.2 g/dL    Albumin 3.8 3.5 - 5.0 g/dL    Globulin 4.3 (H) 2.0 - 4.0 g/dL    A-G Ratio 0.9 (L) 1.1 - 2.2         XR Results (most recent):  Results from Hospital Encounter encounter on 05/12/22    XR FLUOROSCOPY UNDER 60 MINUTES    Narrative  Intraoperative fluoroscopy. Intraoperative fluoroscopy provided. This report is for fluoroscopy time only. Adequacy any device placement and procedure outcomes as determined by operating  physician. Fluoroscopy time- 42 sec         CT ABD PELV WO CONT   Final Result   1. Obstructing mid left ureteral calculus (7 x 12 mm). Moderate to severe   hydronephrosis. 2. Multiple, additional, nonobstructing, bilateral renal calculi. Review of Systems:  Constitutional: Negative for chills and fever. HENT: Negative. Eyes: Negative. Respiratory: Negative. Cardiovascular: Negative. Gastrointestinal: Negative for abdominal pain and nausea. Skin: Negative. Neurological: Negative. Objective:   VITALS:    Visit Vitals  BP (!) 150/104   Pulse 97   Temp 97.9 °F (36.6 °C)   Resp 19   Ht 5' 5\" (1.651 m)   Wt 102.1 kg (225 lb)   SpO2 98%   BMI 37.44 kg/m²       Physical Exam:   Constitutional: pt is oriented to person, place, and time. HENT:   Head: Normocephalic and atraumatic. Eyes: Pupils are equal, round, and reactive to light. EOM are normal.   Cardiovascular: Normal rate, regular rhythm and normal heart sounds. Pulmonary/Chest: Breath sounds normal. No wheezes. No rales. Exhibits no tenderness. Abdominal: Soft. Bowel sounds are normal. There is no abdominal tenderness. There is no rebound and no guarding. Musculoskeletal: Normal range of motion. Neurological: pt is alert and oriented to person, place, and time. Alert. Normal strength. No cranial nerve deficit or sensory deficit. Displays a negative Romberg sign. ASSESSMENT & PLAN:      1.  Obstructing mid left ureteral calculus (7 x 12 mm). Moderate to severe  hydronephrosis.   2. Multiple, additional, nonobstructing, bilateral renal calculi      Start on IV fluids blood cultures urine cultures IV Rocephin  Urology consult    Current Facility-Administered Medications:     ketorolac (TORADOL) tablet 10 mg, 10 mg, Oral, Q6H PRN, Imani Madrid MD    0.9% sodium chloride infusion, 125 mL/hr, IntraVENous, CONTINUOUS, Aniyah Madrid MD    acetaminophen (TYLENOL) tablet 650 mg, 650 mg, Oral, Q6H PRN **OR** acetaminophen (TYLENOL) suppository 650 mg, 650 mg, Rectal, Q6H PRN, Imani Madrid MD    polyethylene glycol (MIRALAX) packet 17 g, 17 g, Oral, DAILY PRN, Imani Madrid MD    ondansetron (ZOFRAN ODT) tablet 4 mg, 4 mg, Oral, Q8H PRN **OR** ondansetron (ZOFRAN) injection 4 mg, 4 mg, IntraVENous, Q6H PRN, Imani Madrid MD    [START ON 10/14/2022] enoxaparin (LOVENOX) injection 40 mg, 40 mg, SubCUTAneous, DAILY, Aniyah Madrid MD    HYDROmorphone (DILAUDID) injection 1 mg, 1 mg, IntraVENous, Q4H PRN, Imani Madrid MD    cefTRIAXone (ROCEPHIN) 1 g in sterile water (preservative free) 10 mL IV syringe, 1 g, IntraVENous, Q24H, Imani Madrid MD    Current Outpatient Medications:     ketorolac (TORADOL) 10 mg tablet, Take 1 Tablet by mouth every six (6) hours as needed for Pain., Disp: 20 Tablet, Rfl: 0    ondansetron hcl (ZOFRAN) 4 mg tablet, , Disp: , Rfl:        ________________________________________________________________________    Signed: Keerthi Bell MD

## 2022-10-13 NOTE — ANESTHESIA PREPROCEDURE EVALUATION
Relevant Problems   RENAL FAILURE   (+) Hydronephrosis   (+) Kidney infection   (+) Kidney stone   (+) Kidney stones   (+) Renal stone       Anesthetic History   No history of anesthetic complications            Review of Systems / Medical History  Patient summary reviewed, nursing notes reviewed and pertinent labs reviewed    Pulmonary  Within defined limits                 Neuro/Psych   Within defined limits           Cardiovascular  Within defined limits                     GI/Hepatic/Renal         Renal disease (left ureter calculi)       Endo/Other  Within defined limits           Other Findings              Physical Exam    Airway  Mallampati: I  TM Distance: 4 - 6 cm  Neck ROM: normal range of motion   Mouth opening: Normal     Cardiovascular    Rhythm: regular  Rate: normal         Dental  No notable dental hx       Pulmonary  Breath sounds clear to auscultation               Abdominal  GI exam deferred       Other Findings          Past Medical History:   Diagnosis Date    Kidney calculi        Past Surgical History:   Procedure Laterality Date    HX  SECTION      HX LITHOTRIPSY  2017    \"multiple times but  was most recent\"    HX LITHOTRIPSY      HX OTHER SURGICAL  2020    intrauterine device (IUD)    HX TUBAL LIGATION      HX UROLOGICAL  10/12/2021    CYSTOSCOPY, URETEROSCOPY WITH LASER LITHOTRIPSY,     HX UROLOGICAL  10/12/2021    BILATERAL RETROGRADE PYELOGRAM;     HX UROLOGICAL  10/12/2021    LEFT URETERAL STENT PLACEMENT     HX UROLOGICAL  10/27/2021    cystoscopy stent remove    HX UROLOGICAL  2022    cystoscopy    HX UROLOGICAL  2022    Right retrograde,     HX UROLOGICAL Right 2022    right diagnostic ureteroscopy       Current Outpatient Medications   Medication Instructions    ketorolac (TORADOL) 10 mg, Oral, EVERY 6 HOURS AS NEEDED    ondansetron hcl (ZOFRAN) 4 mg tablet No dose, route, or frequency recorded.        Current Facility-Administered Medications   Medication Dose Route Frequency    ketorolac (TORADOL) tablet 10 mg  10 mg Oral Q6H PRN    0.9% sodium chloride infusion  125 mL/hr IntraVENous CONTINUOUS    acetaminophen (TYLENOL) tablet 650 mg  650 mg Oral Q6H PRN    Or    acetaminophen (TYLENOL) suppository 650 mg  650 mg Rectal Q6H PRN    polyethylene glycol (MIRALAX) packet 17 g  17 g Oral DAILY PRN    ondansetron (ZOFRAN ODT) tablet 4 mg  4 mg Oral Q8H PRN    Or    ondansetron (ZOFRAN) injection 4 mg  4 mg IntraVENous Q6H PRN    enoxaparin (LOVENOX) injection 30 mg  30 mg SubCUTAneous BID    HYDROmorphone (DILAUDID) injection 1 mg  1 mg IntraVENous Q4H PRN    [START ON 10/14/2022] cefTRIAXone (ROCEPHIN) 1 g in sterile water (preservative free) 10 mL IV syringe  1 g IntraVENous Q24H    opium-belladonna (B&O 16-A SUPPRETTE) 16.2-60 mg suppository 1 Suppository  1 Suppository Rectal Q8H PRN       Patient Vitals for the past 24 hrs:   Temp Pulse Resp BP SpO2   10/13/22 1359 -- 80 -- (!) 154/88 99 %   10/13/22 1330 -- -- -- (!) 148/71 97 %   10/13/22 1230 -- 97 -- (!) 150/104 98 %   10/13/22 1130 -- -- -- (!) 150/74 94 %   10/13/22 1030 -- -- -- (!) 159/92 --   10/13/22 0923 36.6 °C (97.9 °F) 85 19 (!) 177/93 99 %       Lab Results   Component Value Date/Time    WBC 9.6 10/13/2022 09:55 AM    HGB 14.3 10/13/2022 09:55 AM    HCT 44.3 10/13/2022 09:55 AM    PLATELET 274 65/84/4280 09:55 AM    MCV 87.9 10/13/2022 09:55 AM     Lab Results   Component Value Date/Time    Sodium 139 10/13/2022 09:55 AM    Potassium 4.7 10/13/2022 09:55 AM    Chloride 110 (H) 10/13/2022 09:55 AM    CO2 24 10/13/2022 09:55 AM    Anion gap 5 10/13/2022 09:55 AM    Glucose 104 (H) 10/13/2022 09:55 AM    BUN 18 10/13/2022 09:55 AM    Creatinine 0.94 10/13/2022 09:55 AM    BUN/Creatinine ratio 19 10/13/2022 09:55 AM    GFR est AA >60 05/02/2022 03:48 PM    GFR est non-AA >60 05/02/2022 03:48 PM    Calcium 9.5 10/13/2022 09:55 AM     No results found for: APTT, PTP, INR, INREXT  Lab Results   Component Value Date/Time    Glucose 104 (H) 10/13/2022 09:55 AM       Anesthetic Plan    ASA: 1  Anesthesia type: general    Monitoring Plan: Continuous noninvasive hemodynamic monitoring      Induction: Intravenous  Anesthetic plan and risks discussed with: Patient

## 2022-10-13 NOTE — CONSULTS
UROLOGY CONSULT NOTE  [phone]        Patient: Gerard Lucero MRN: [id number]  SSN: [id number]    YOB: 1985  Age: 40 y.o. Sex: female      REFERRING PROVIDER: javier  Subjective:      Gerard Lucero is a 40 y.o. female who is being seen in urologic consultation for   Patient is a 40y.o. year old female past medical history of renal stone status post lithotripsy came to the ER with 1 week history of left flank pain today getting more worse seen by the ER physician CT scan of the abdomen done Shows  IMPRESSION  1. Obstructing mid left ureteral calculus (7 x 12 mm). Moderate to severe  hydronephrosis.   2. Multiple, additional, nonobstructing, bilateral renal calculi  Past Medical History:   Diagnosis Date    Kidney calculi      Past Surgical History:   Procedure Laterality Date    HX  SECTION      HX LITHOTRIPSY  2017    \"multiple times but 2017 was most recent\"    HX LITHOTRIPSY      HX OTHER SURGICAL  2020    intrauterine device (IUD)    HX TUBAL LIGATION      HX UROLOGICAL  10/12/2021    CYSTOSCOPY, URETEROSCOPY WITH LASER LITHOTRIPSY,     HX UROLOGICAL  10/12/2021    BILATERAL RETROGRADE PYELOGRAM;     HX UROLOGICAL  10/12/2021    LEFT URETERAL STENT PLACEMENT     HX UROLOGICAL  10/27/2021    cystoscopy stent remove    HX UROLOGICAL  2022    cystoscopy    HX UROLOGICAL  2022    Right retrograde,     HX UROLOGICAL Right 2022    right diagnostic ureteroscopy      Family History   Problem Relation Age of Onset    Uterine Cancer Maternal Grandmother     Diabetes Paternal Grandmother     Liver Cancer Paternal Grandfather      Social History     Tobacco Use    Smoking status: Every Day     Packs/day: 0.50     Years: 10.00     Pack years: 5.00     Types: Cigarettes    Smokeless tobacco: Current   Substance Use Topics    Alcohol use: No     Comment: rarely      Current Facility-Administered Medications   Medication Dose Route Frequency Provider Last Rate Last Admin    ketorolac (TORADOL) tablet 10 mg  10 mg Oral Q6H PRN Hair Madrid MD        0.9% sodium chloride infusion  125 mL/hr IntraVENous CONTINUOUS Hair Madrid MD        acetaminophen (TYLENOL) tablet 650 mg  650 mg Oral Q6H PRN Hair Madrid MD        Or    acetaminophen (TYLENOL) suppository 650 mg  650 mg Rectal Q6H PRN Hair Madrid MD        polyethylene glycol (MIRALAX) packet 17 g  17 g Oral DAILY PRN Hair Madrid MD        ondansetron (ZOFRAN ODT) tablet 4 mg  4 mg Oral Q8H PRN Hair Madrid MD        Or    ondansetron (ZOFRAN) injection 4 mg  4 mg IntraVENous Q6H PRN Hair Madrid MD        enoxaparin (LOVENOX) injection 30 mg  30 mg SubCUTAneous BID Imani Madrid MD        HYDROmorphone (DILAUDID) injection 1 mg  1 mg IntraVENous Q4H PRN MD Ashley Adames ON 10/14/2022] cefTRIAXone (ROCEPHIN) 1 g in sterile water (preservative free) 10 mL IV syringe  1 g IntraVENous Q24H Imani Madrid MD         Current Outpatient Medications   Medication Sig Dispense Refill    ketorolac (TORADOL) 10 mg tablet Take 1 Tablet by mouth every six (6) hours as needed for Pain. 20 Tablet 0    ondansetron hcl (ZOFRAN) 4 mg tablet           Allergies   Allergen Reactions    Ciprofloxacin Other (comments)     Thrush       Review of Systems:  ROS  Constitutional: Negative for chills and fever. HENT: Negative. Eyes: Negative. Respiratory: Negative. Cardiovascular: Negative. Gastrointestinal:positive  for abdominal pain and nausea. Skin: Negative. Neurological: Negative.     Objective:     Vitals:    10/13/22 0923 10/13/22 1030 10/13/22 1130 10/13/22 1230   BP: (!) 177/93 (!) 159/92 (!) 150/74 (!) 150/104   Pulse: 85   97   Resp: 19      Temp: 97.9 °F (36.6 °C)      SpO2: 99%  94% 98%   Weight: 225 lb (102.1 kg)      Height: 5' 5\" (1.651 m)           Recent Labs     10/13/22  0955   WBC 9.6   HGB 14.3   HCT 44.3        Recent Labs     10/13/22  0955    K 4.7   *   CO2 24   *   BUN 18   CREA 0.94   CA 9.5   ALB 3.8   TBILI 0.2   ALT 19     No results for input(s): PH, PCO2, PO2, HCO3, FIO2 in the last 72 hours. 24 Hour Results:  Recent Results (from the past 24 hour(s))   URINALYSIS W/ RFLX MICROSCOPIC    Collection Time: 10/13/22  9:44 AM   Result Value Ref Range    Color Yellow/Straw      Appearance Turbid (A) Clear      Specific gravity 1.019 1.003 - 1.030      pH (UA) 6.0 5.0 - 8.0      Protein 100 (A) Negative mg/dL    Glucose Negative Negative mg/dL    Ketone Negative Negative mg/dL    Bilirubin Negative Negative      Blood Moderate (A) Negative      Urobilinogen 0.1 0.1 - 1.0 EU/dL    Nitrites Negative Negative      Leukocyte Esterase Moderate (A) Negative      WBC 20-50 0 - 4 /hpf    RBC 5-10 0 - 5 /hpf    Bacteria Negative Negative /hpf    Mucus Trace /lpf   HCG URINE, QL    Collection Time: 10/13/22  9:44 AM   Result Value Ref Range    HCG urine, QL Negative Negative     URINE MICROSCOPIC    Collection Time: 10/13/22  9:44 AM   Result Value Ref Range    WBC 20-50 0 - 4 /hpf    RBC 5-10 0 - 5 /hpf    Bacteria Negative Negative /hpf    Mucus Trace (A) Negative /lpf   CBC WITH AUTOMATED DIFF    Collection Time: 10/13/22  9:55 AM   Result Value Ref Range    WBC 9.6 3.6 - 11.0 K/uL    RBC 5.04 3.80 - 5.20 M/uL    HGB 14.3 11.5 - 16.0 g/dL    HCT 44.3 35.0 - 47.0 %    MCV 87.9 80.0 - 99.0 FL    MCH 28.4 26.0 - 34.0 PG    MCHC 32.3 30.0 - 36.5 g/dL    RDW 13.4 11.5 - 14.5 %    PLATELET 616 378 - 705 K/uL    MPV 10.8 8.9 - 12.9 FL    NRBC 0.0 0.0  WBC    ABSOLUTE NRBC 0.00 0.00 - 0.01 K/uL    NEUTROPHILS 71 32 - 75 %    LYMPHOCYTES 19 12 - 49 %    MONOCYTES 5 5 - 13 %    EOSINOPHILS 4 0 - 7 %    BASOPHILS 1 0 - 1 %    IMMATURE GRANULOCYTES 0 0 - 0.5 %    ABS. NEUTROPHILS 6.9 1.8 - 8.0 K/UL    ABS. LYMPHOCYTES 1.8 0.8 - 3.5 K/UL    ABS. MONOCYTES 0.4 0.0 - 1.0 K/UL    ABS. EOSINOPHILS 0.4 0.0 - 0.4 K/UL    ABS.  BASOPHILS 0.1 0.0 - 0.1 K/UL    ABS. IMM. GRANS. 0.0 0.00 - 0.04 K/UL    DF AUTOMATED     METABOLIC PANEL, COMPREHENSIVE    Collection Time: 10/13/22  9:55 AM   Result Value Ref Range    Sodium 139 136 - 145 mmol/L    Potassium 4.7 3.5 - 5.1 mmol/L    Chloride 110 (H) 97 - 108 mmol/L    CO2 24 21 - 32 mmol/L    Anion gap 5 5 - 15 mmol/L    Glucose 104 (H) 65 - 100 mg/dL    BUN 18 6 - 20 mg/dL    Creatinine 0.94 0.55 - 1.02 mg/dL    BUN/Creatinine ratio 19 12 - 20      eGFR >60 >60 ml/min/1.73m2    Calcium 9.5 8.5 - 10.1 mg/dL    Bilirubin, total 0.2 0.2 - 1.0 mg/dL    AST (SGOT) 18 15 - 37 U/L    ALT (SGPT) 19 12 - 78 U/L    Alk. phosphatase 149 (H) 45 - 117 U/L    Protein, total 8.1 6.4 - 8.2 g/dL    Albumin 3.8 3.5 - 5.0 g/dL    Globulin 4.3 (H) 2.0 - 4.0 g/dL    A-G Ratio 0.9 (L) 1.1 - 2.2         Physical Exam:  Physical Exam    Constitutional: pt is oriented to person, place, and time. HENT:   Head: Normocephalic and atraumatic. Eyes: Pupils are equal, round, and reactive to light. EOM are normal.   Cardiovascular: Normal rate, regular rhythm and normal heart sounds. Pulmonary/Chest: Breath sounds normal. No wheezes. No rales. Exhibits no tenderness. Abdominal: Soft. Bowel sounds are normal. There is no abdominal tenderness. There is no rebound and no guarding. Musculoskeletal: Normal range of motion. Neurological: pt is alert and oriented to person, place, and time. Alert. Normal strength. No cranial nerve deficit or sensory deficit. Displays a negative Romberg sign.        Assessment: Mostly white cells in her urine with a12mm mid ureteral stone on the left     Hospital Problems  Date Reviewed: 6/25/2022            Codes Class Noted POA    Kidney stone ICD-10-CM: N20.0  ICD-9-CM: 592.0  10/13/2022 Unknown        UTI (urinary tract infection) ICD-10-CM: N39.0  ICD-9-CM: 599.0  10/13/2022 Unknown        Kidney stones ICD-10-CM: N20.0  ICD-9-CM: 592.0  10/13/2022 Unknown        Hydronephrosis ICD-10-CM: N13.30  ICD-9-CM: 222  10/19/2021 Unknown    Overview Addendum 6/7/2022  8:29 PM by Marko Arnold NP     The patient had moderate obstruction of the left kidney secondary to a mid left ureteral stone measuring 9 x 13 mm.    10/12/21-LEFT ureteroscopy with laser lithotripsy and stone basketing, left RPG, and left ureteral stent placement                  Plan: Diascopy, double-J stent placement Rocephin for now will need lithotripsy   The risk of bleeding infection injury to kidney ureter vascular injury pulm embolus death were of alternatives she has no questions  Hospital Problems  Date Reviewed: 6/25/2022            Codes Class Noted POA    Kidney stone ICD-10-CM: N20.0  ICD-9-CM: 592.0  10/13/2022 Unknown        UTI (urinary tract infection) ICD-10-CM: N39.0  ICD-9-CM: 599.0  10/13/2022 Unknown        Kidney stones ICD-10-CM: N20.0  ICD-9-CM: 592.0  10/13/2022 Unknown        Hydronephrosis ICD-10-CM: N13.30  ICD-9-CM: 984  10/19/2021 Unknown    Overview Addendum 6/7/2022  8:29 PM by Marko Arnold NP     The patient had moderate obstruction of the left kidney secondary to a mid left ureteral stone measuring 9 x 13 mm.    10/12/21-LEFT ureteroscopy with laser lithotripsy and stone basketing, left RPG, and left ureteral stent placement                      Signed By: Maryann Aquino MD     October 13, 2022

## 2022-10-14 VITALS
SYSTOLIC BLOOD PRESSURE: 124 MMHG | RESPIRATION RATE: 22 BRPM | HEIGHT: 65 IN | WEIGHT: 225 LBS | HEART RATE: 73 BPM | OXYGEN SATURATION: 97 % | BODY MASS INDEX: 37.49 KG/M2 | TEMPERATURE: 97.9 F | DIASTOLIC BLOOD PRESSURE: 71 MMHG

## 2022-10-14 LAB
ALBUMIN SERPL-MCNC: 3 G/DL (ref 3.5–5)
ALBUMIN/GLOB SERPL: 0.9 {RATIO} (ref 1.1–2.2)
ALP SERPL-CCNC: 127 U/L (ref 45–117)
ALT SERPL-CCNC: 13 U/L (ref 12–78)
ANION GAP SERPL CALC-SCNC: 6 MMOL/L (ref 5–15)
AST SERPL W P-5'-P-CCNC: 11 U/L (ref 15–37)
BASOPHILS # BLD: 0 K/UL (ref 0–0.1)
BASOPHILS NFR BLD: 0 % (ref 0–1)
BILIRUB SERPL-MCNC: 0.2 MG/DL (ref 0.2–1)
BUN SERPL-MCNC: 14 MG/DL (ref 6–20)
BUN/CREAT SERPL: 15 (ref 12–20)
CA-I BLD-MCNC: 8.8 MG/DL (ref 8.5–10.1)
CHLORIDE SERPL-SCNC: 109 MMOL/L (ref 97–108)
CO2 SERPL-SCNC: 23 MMOL/L (ref 21–32)
CREAT SERPL-MCNC: 0.94 MG/DL (ref 0.55–1.02)
DIFFERENTIAL METHOD BLD: ABNORMAL
EOSINOPHIL # BLD: 0 K/UL (ref 0–0.4)
EOSINOPHIL NFR BLD: 0 % (ref 0–7)
ERYTHROCYTE [DISTWIDTH] IN BLOOD BY AUTOMATED COUNT: 13.1 % (ref 11.5–14.5)
GLOBULIN SER CALC-MCNC: 3.5 G/DL (ref 2–4)
GLUCOSE SERPL-MCNC: 240 MG/DL (ref 65–100)
HCT VFR BLD AUTO: 39.9 % (ref 35–47)
HGB BLD-MCNC: 12.5 G/DL (ref 11.5–16)
IMM GRANULOCYTES # BLD AUTO: 0.1 K/UL (ref 0–0.04)
IMM GRANULOCYTES NFR BLD AUTO: 1 % (ref 0–0.5)
LYMPHOCYTES # BLD: 1.4 K/UL (ref 0.8–3.5)
LYMPHOCYTES NFR BLD: 9 % (ref 12–49)
MCH RBC QN AUTO: 28.2 PG (ref 26–34)
MCHC RBC AUTO-ENTMCNC: 31.3 G/DL (ref 30–36.5)
MCV RBC AUTO: 90.1 FL (ref 80–99)
MONOCYTES # BLD: 0.6 K/UL (ref 0–1)
MONOCYTES NFR BLD: 4 % (ref 5–13)
NEUTS SEG # BLD: 12.6 K/UL (ref 1.8–8)
NEUTS SEG NFR BLD: 86 % (ref 32–75)
NRBC # BLD: 0 K/UL (ref 0–0.01)
NRBC BLD-RTO: 0 PER 100 WBC
PLATELET # BLD AUTO: 345 K/UL (ref 150–400)
PMV BLD AUTO: 10.9 FL (ref 8.9–12.9)
POTASSIUM SERPL-SCNC: 4.2 MMOL/L (ref 3.5–5.1)
PROT SERPL-MCNC: 6.5 G/DL (ref 6.4–8.2)
RBC # BLD AUTO: 4.43 M/UL (ref 3.8–5.2)
SODIUM SERPL-SCNC: 138 MMOL/L (ref 136–145)
WBC # BLD AUTO: 14.7 K/UL (ref 3.6–11)

## 2022-10-14 PROCEDURE — 74011250637 HC RX REV CODE- 250/637: Performed by: UROLOGY

## 2022-10-14 PROCEDURE — 74011000250 HC RX REV CODE- 250: Performed by: FAMILY MEDICINE

## 2022-10-14 PROCEDURE — 85025 COMPLETE CBC W/AUTO DIFF WBC: CPT

## 2022-10-14 PROCEDURE — 36415 COLL VENOUS BLD VENIPUNCTURE: CPT

## 2022-10-14 PROCEDURE — 74011250636 HC RX REV CODE- 250/636: Performed by: FAMILY MEDICINE

## 2022-10-14 PROCEDURE — 80053 COMPREHEN METABOLIC PANEL: CPT

## 2022-10-14 PROCEDURE — 94762 N-INVAS EAR/PLS OXIMTRY CONT: CPT

## 2022-10-14 RX ORDER — METHENAMINE HIPPURATE 1000 MG/1
1 TABLET ORAL 2 TIMES DAILY
Qty: 18 TABLET | Refills: 0 | Status: SHIPPED | OUTPATIENT
Start: 2022-10-14 | End: 2022-10-20

## 2022-10-14 RX ORDER — HYDROCODONE BITARTRATE AND ACETAMINOPHEN 5; 325 MG/1; MG/1
1 TABLET ORAL
Qty: 10 TABLET | Refills: 0 | Status: SHIPPED | OUTPATIENT
Start: 2022-10-14 | End: 2022-10-20

## 2022-10-14 RX ADMIN — HYDROMORPHONE HYDROCHLORIDE 1 MG: 1 INJECTION, SOLUTION INTRAMUSCULAR; INTRAVENOUS; SUBCUTANEOUS at 07:28

## 2022-10-14 RX ADMIN — CEFTRIAXONE SODIUM 1 G: 1 INJECTION, POWDER, FOR SOLUTION INTRAMUSCULAR; INTRAVENOUS at 12:33

## 2022-10-14 RX ADMIN — SODIUM CHLORIDE 125 ML/HR: 9 INJECTION, SOLUTION INTRAVENOUS at 07:32

## 2022-10-14 RX ADMIN — METHENAMINE HIPPURATE 1 G: 1 TABLET ORAL at 09:55

## 2022-10-14 NOTE — DISCHARGE SUMMARY
Discharge Summary       PATIENT ID: Edwin Hernandez  MRN: [id number]   YOB: 1985    DATE OF ADMISSION: 10/13/2022  9:24 AM    DATE OF DISCHARGE:   PRIMARY CARE PROVIDER: Viky Álvarez MD     ATTENDING PHYSICIAN: Luz Maria Gross  DISCHARGING PROVIDER: Vergie Kehr Mohiuddin      CONSULTATIONS: IP CONSULT TO UROLOGY    PROCEDURES/SURGERIES: Procedure(s):  CYSTOSCOPY WITH LEFT URETERAL STENT PLACEMENT    ADMITTING DIAGNOSES:    Patient Active Problem List    Diagnosis Date Noted    Kidney stone 10/13/2022    UTI (urinary tract infection) 10/13/2022    Kidney stones 10/13/2022    Pain in the abdomen 05/12/2022    Gross hematuria 05/08/2022    Right flank pain 05/03/2022    Kidney infection 05/03/2022    Cystitis 10/20/2021    Pyelonephritis 10/20/2021    Hydronephrosis 10/19/2021    Renal stone 10/10/2021    Calculus, ureter 09/02/2017       DISCHARGE DIAGNOSES / PLAN:         Active Hospital Problems    Diagnosis Date Noted    Kidney stone 10/13/2022    UTI (urinary tract infection) 10/13/2022    Kidney stones 10/13/2022    Hydronephrosis 10/19/2021     The patient had moderate obstruction of the left kidney secondary to a mid left ureteral stone measuring 9 x 13 mm.    10/12/21-LEFT ureteroscopy with laser lithotripsy and stone basketing, left RPG, and left ureteral stent placement            ADDITIONAL CARE RECOMMENDATIONS:      1. Obstructing mid left ureteral calculus (7 x 12 mm). Moderate to severe  hydronephrosis. 2. Multiple, additional, nonobstructing, bilateral renal calculi      DISCHARGE MEDICATIONS:  Current Discharge Medication List        START taking these medications    Details   methenamine hippurate (HIPREX) 1 gram tablet Take 1 Tablet by mouth two (2) times a day for 18 doses. Qty: 18 Tablet, Refills: 0  Start date: 10/14/2022, End date: 10/23/2022      HYDROcodone-acetaminophen (Norco) 5-325 mg per tablet Take 1 Tablet by mouth every six (6) hours as needed for Pain for up to 3 days. Max Daily Amount: 4 Tablets. Qty: 10 Tablet, Refills: 0  Start date: 10/14/2022, End date: 10/17/2022    Associated Diagnoses: Pyelonephritis           STOP taking these medications       ketorolac (TORADOL) 10 mg tablet Comments:   Reason for Stopping:         ondansetron hcl (ZOFRAN) 4 mg tablet Comments:   Reason for Stopping:                 NOTIFY YOUR PHYSICIAN FOR ANY OF THE FOLLOWING:   Fever over 101 degrees for 24 hours. Chest pain, shortness of breath, fever, chills, nausea, vomiting, diarrhea, change in mentation, falling, weakness, bleeding. Severe pain or pain not relieved by medications. Or, any other signs or symptoms that you may have questions about. DISPOSITION:  x  Home With:   OT  PT  HH  RN       Long term SNF/Inpatient Rehab    Independent/assisted living    Hospice    Other:       PATIENT CONDITION AT DISCHARGE: Stable      PHYSICAL EXAMINATION AT DISCHARGE:  General:          Alert, cooperative, no distress, appears stated age. HEENT:           Atraumatic, anicteric sclerae, pink conjunctivae                          No oral ulcers, mucosa moist, throat clear, dentition fair  Neck:               Supple, symmetrical  Lungs:             Clear to auscultation bilaterally. No Wheezing or Rhonchi. No rales. Chest wall:      No tenderness  No Accessory muscle use. Heart:              Regular  rhythm,  No  murmur   No edema  Abdomen:        Soft, non-tender. Not distended. Bowel sounds normal  Extremities:     No cyanosis. No clubbing,                            Skin turgor normal, Capillary refill normal  Skin:                Not pale. Not Jaundiced  No rashes   Psych:             Not anxious or agitated. Neurologic:      Alert, moves all extremities, answers questions appropriately and responds to commands     XR FLUOROSCOPY UNDER 60 MINUTES   Final Result   Fluoroscopic guidance was provided for the referring clinician.    Fluoroscopy time is 21.7 s.            CT ABD PELV WO CONT   Final Result   1. Obstructing mid left ureteral calculus (7 x 12 mm). Moderate to severe   hydronephrosis. 2. Multiple, additional, nonobstructing, bilateral renal calculi. Recent Results (from the past 24 hour(s))   CULTURE, BLOOD, PAIRED    Collection Time: 10/13/22 12:59 PM    Specimen: Blood   Result Value Ref Range    Special Requests: No Special Requests      Culture result: No growth after 8 hours     METABOLIC PANEL, COMPREHENSIVE    Collection Time: 10/14/22  7:45 AM   Result Value Ref Range    Sodium 138 136 - 145 mmol/L    Potassium 4.2 3.5 - 5.1 mmol/L    Chloride 109 (H) 97 - 108 mmol/L    CO2 23 21 - 32 mmol/L    Anion gap 6 5 - 15 mmol/L    Glucose 240 (H) 65 - 100 mg/dL    BUN 14 6 - 20 mg/dL    Creatinine 0.94 0.55 - 1.02 mg/dL    BUN/Creatinine ratio 15 12 - 20      eGFR >60 >60 ml/min/1.73m2    Calcium 8.8 8.5 - 10.1 mg/dL    Bilirubin, total 0.2 0.2 - 1.0 mg/dL    AST (SGOT) 11 (L) 15 - 37 U/L    ALT (SGPT) 13 12 - 78 U/L    Alk. phosphatase 127 (H) 45 - 117 U/L    Protein, total 6.5 6.4 - 8.2 g/dL    Albumin 3.0 (L) 3.5 - 5.0 g/dL    Globulin 3.5 2.0 - 4.0 g/dL    A-G Ratio 0.9 (L) 1.1 - 2.2     CBC WITH AUTOMATED DIFF    Collection Time: 10/14/22  7:45 AM   Result Value Ref Range    WBC 14.7 (H) 3.6 - 11.0 K/uL    RBC 4.43 3.80 - 5.20 M/uL    HGB 12.5 11.5 - 16.0 g/dL    HCT 39.9 35.0 - 47.0 %    MCV 90.1 80.0 - 99.0 FL    MCH 28.2 26.0 - 34.0 PG    MCHC 31.3 30.0 - 36.5 g/dL    RDW 13.1 11.5 - 14.5 %    PLATELET 232 780 - 431 K/uL    MPV 10.9 8.9 - 12.9 FL    NRBC 0.0 0.0  WBC    ABSOLUTE NRBC 0.00 0.00 - 0.01 K/uL    NEUTROPHILS 86 (H) 32 - 75 %    LYMPHOCYTES 9 (L) 12 - 49 %    MONOCYTES 4 (L) 5 - 13 %    EOSINOPHILS 0 0 - 7 %    BASOPHILS 0 0 - 1 %    IMMATURE GRANULOCYTES 1 (H) 0 - 0.5 %    ABS. NEUTROPHILS 12.6 (H) 1.8 - 8.0 K/UL    ABS. LYMPHOCYTES 1.4 0.8 - 3.5 K/UL    ABS. MONOCYTES 0.6 0.0 - 1.0 K/UL    ABS. EOSINOPHILS 0.0 0.0 - 0.4 K/UL    ABS.  BASOPHILS 0.0 0.0 - 0.1 K/UL    ABS. IMM.  GRANS. 0.1 (H) 0.00 - 0.04 K/UL    DF AUTOMATED            HOSPITAL COURSE:      History of present illness precipitation physical at the time of admission as the patient was admitted because of obstructive mid left ureteral calculus moderate to severe hydronephrosis  Seen by the urology and patient had stent placed patient tolerated procedure well patient has no much pain today  Cleared for discharge and follow-up with urology in 1 week discharged on Hip-[facility]      Signed:   Shamir Méndez MD  10/14/2022  11:31 AM

## 2022-10-14 NOTE — ANESTHESIA POSTPROCEDURE EVALUATION
Procedure(s):  CYSTOSCOPY WITH LEFT URETERAL STENT PLACEMENT. general    Anesthesia Post Evaluation      Multimodal analgesia: multimodal analgesia used between 6 hours prior to anesthesia start to PACU discharge  Patient location during evaluation: PACU  Patient participation: complete - patient participated  Level of consciousness: awake  Pain score: 0  Pain management: adequate  Airway patency: patent  Anesthetic complications: no  Cardiovascular status: acceptable  Respiratory status: acceptable  Hydration status: acceptable  Post anesthesia nausea and vomiting:  controlled  Final Post Anesthesia Temperature Assessment:  Normothermia (36.0-37.5 degrees C)      INITIAL Post-op Vital signs:   Vitals Value Taken Time   /82 10/13/22 1520   Temp 36.4 °C (97.6 °F) 10/13/22 1450   Pulse 80 10/13/22 1524   Resp 15 10/13/22 1524   SpO2 97 % 10/13/22 1524   Vitals shown include unvalidated device data.

## 2022-10-14 NOTE — PROGRESS NOTES
UROLOGY Progress Note         [phone]      Daily Progress Note: 10/14/2022      Subjective: The patient is seen for UROLOGIC follow up for left uretal stone   The patient presented to ED on 10/13/22 with left flank pain for about 1 week. The patient with past  medical history of kidney stone, status post lithotripsy     The patient is s/p cystoscopy with left ureteral stent placement on 10/13/22  The patient had obstructing mild left ureteral calculus 7x12 mm and moderate hydronephrosis. Today, the patient is seen at the bedside alert and oriented. She complains of intermittent pain left flank/ groin area pain with voiding( sent pain). She is afebrile, denies N/V, chills. She is ready to go home   Labs:  UA from 10/13/22  positive for leuk and blood. Patient received IV rocephin.  Urine culture not obtained  Blood cultures no growth      Problem List:  Patient Active Problem List   Diagnosis Code    Renal stone N20.0    Hydronephrosis N13.30    Cystitis N30.90    Pyelonephritis N12    Calculus, ureter N20.1    Right flank pain R10.9    Kidney infection N15.9    Gross hematuria R31.0    Pain in the abdomen R10.9    Kidney stone N20.0    UTI (urinary tract infection) N39.0    Kidney stones N20.0         Medications reviewed  Current Facility-Administered Medications   Medication Dose Route Frequency    0.9% sodium chloride infusion  125 mL/hr IntraVENous CONTINUOUS    acetaminophen (TYLENOL) tablet 650 mg  650 mg Oral Q6H PRN    Or    acetaminophen (TYLENOL) suppository 650 mg  650 mg Rectal Q6H PRN    polyethylene glycol (MIRALAX) packet 17 g  17 g Oral DAILY PRN    ondansetron (ZOFRAN ODT) tablet 4 mg  4 mg Oral Q8H PRN    Or    ondansetron (ZOFRAN) injection 4 mg  4 mg IntraVENous Q6H PRN    enoxaparin (LOVENOX) injection 30 mg  30 mg SubCUTAneous BID    HYDROmorphone (DILAUDID) injection 1 mg  1 mg IntraVENous Q4H PRN    cefTRIAXone (ROCEPHIN) 1 g in sterile water (preservative free) 10 mL IV syringe  1 g IntraVENous Q24H    opium-belladonna (B&O 16-A SUPPRETTE) 16.2-60 mg suppository 1 Suppository  1 Suppository Rectal Q8H PRN    methenamine hippurate (HIPREX) tablet 1 g  1 g Oral BID    diphenhydrAMINE (BENADRYL) capsule 25 mg  25 mg Oral Q6H PRN    ibuprofen (MOTRIN) tablet 400 mg  400 mg Oral Q6H PRN       Review of Systems:   Review of Systems   Constitutional: Negative. HENT: Negative. Eyes: Negative. Respiratory: Negative. Cardiovascular: Negative. Gastrointestinal: Negative. Genitourinary:         Left flank pain   Musculoskeletal: Negative. Skin: Negative. Neurological: Negative. Endo/Heme/Allergies: Negative. Psychiatric/Behavioral: Negative.            Objective:   Physical Exam     Visit Vitals  /71 (BP 1 Location: Left upper arm, BP Patient Position: At rest;Lying)   Pulse 73   Temp 97.9 °F (36.6 °C)   Resp 22   Ht 5' 5\" (1.651 m)   Wt 225 lb (102.1 kg)   SpO2 97%   BMI 37.44 kg/m²         Data Review:       Recent Days:  Recent Labs     10/14/22  0745 10/13/22  0955   WBC 14.7* 9.6   HGB 12.5 14.3   HCT 39.9 44.3    382     Recent Labs     10/14/22  0745 10/13/22  0955    139   K 4.2 4.7   * 110*   CO2 23 24   * 104*   BUN 14 18   CREA 0.94 0.94   CA 8.8 9.5   ALB 3.0* 3.8   TBILI 0.2 0.2   ALT 13 19       24 Hour Results:  Recent Results (from the past 24 hour(s))   CULTURE, BLOOD, PAIRED    Collection Time: 10/13/22 12:59 PM    Specimen: Blood   Result Value Ref Range    Special Requests: No Special Requests      Culture result: No growth after 8 hours     METABOLIC PANEL, COMPREHENSIVE    Collection Time: 10/14/22  7:45 AM   Result Value Ref Range    Sodium 138 136 - 145 mmol/L    Potassium 4.2 3.5 - 5.1 mmol/L    Chloride 109 (H) 97 - 108 mmol/L    CO2 23 21 - 32 mmol/L    Anion gap 6 5 - 15 mmol/L    Glucose 240 (H) 65 - 100 mg/dL    BUN 14 6 - 20 mg/dL    Creatinine 0.94 0.55 - 1.02 mg/dL    BUN/Creatinine ratio 15 12 - 20 eGFR >60 >60 ml/min/1.73m2    Calcium 8.8 8.5 - 10.1 mg/dL    Bilirubin, total 0.2 0.2 - 1.0 mg/dL    AST (SGOT) 11 (L) 15 - 37 U/L    ALT (SGPT) 13 12 - 78 U/L    Alk. phosphatase 127 (H) 45 - 117 U/L    Protein, total 6.5 6.4 - 8.2 g/dL    Albumin 3.0 (L) 3.5 - 5.0 g/dL    Globulin 3.5 2.0 - 4.0 g/dL    A-G Ratio 0.9 (L) 1.1 - 2.2     CBC WITH AUTOMATED DIFF    Collection Time: 10/14/22  7:45 AM   Result Value Ref Range    WBC 14.7 (H) 3.6 - 11.0 K/uL    RBC 4.43 3.80 - 5.20 M/uL    HGB 12.5 11.5 - 16.0 g/dL    HCT 39.9 35.0 - 47.0 %    MCV 90.1 80.0 - 99.0 FL    MCH 28.2 26.0 - 34.0 PG    MCHC 31.3 30.0 - 36.5 g/dL    RDW 13.1 11.5 - 14.5 %    PLATELET 437 366 - 735 K/uL    MPV 10.9 8.9 - 12.9 FL    NRBC 0.0 0.0  WBC    ABSOLUTE NRBC 0.00 0.00 - 0.01 K/uL    NEUTROPHILS 86 (H) 32 - 75 %    LYMPHOCYTES 9 (L) 12 - 49 %    MONOCYTES 4 (L) 5 - 13 %    EOSINOPHILS 0 0 - 7 %    BASOPHILS 0 0 - 1 %    IMMATURE GRANULOCYTES 1 (H) 0 - 0.5 %    ABS. NEUTROPHILS 12.6 (H) 1.8 - 8.0 K/UL    ABS. LYMPHOCYTES 1.4 0.8 - 3.5 K/UL    ABS. MONOCYTES 0.6 0.0 - 1.0 K/UL    ABS. EOSINOPHILS 0.0 0.0 - 0.4 K/UL    ABS. BASOPHILS 0.0 0.0 - 0.1 K/UL    ABS. IMM. GRANS. 0.1 (H) 0.00 - 0.04 K/UL    DF AUTOMATED             Assessment/     Patient Active Problem List   Diagnosis Code    Renal stone N20.0    Hydronephrosis N13.30    Cystitis N30.90    Pyelonephritis N12    Calculus, ureter N20.1    Right flank pain R10.9    Kidney infection N15.9    Gross hematuria R31.0    Pain in the abdomen R10.9    Kidney stone N20.0    UTI (urinary tract infection) N39.0    Kidney stones N20.0       Plan: 1. Left ureteral stone  The patient is s/p cystoscopy with left ureteral stent placement on 10/13/22    The patient had obstructing mild left ureteral calculus 7x12mm and moderate hydronephrosis. UA from 10/13/22  positive for leuk and blood. Patient received IV rocephin.  Urine culture not obtained  Blood cultures no growth     -Patient can be discharge home today  Follow up with Dr Catie Zhou in 1 week      [address] discussed with: Dr. Mirela Garay, Attending Physician      Gillie Ormond, NP

## 2022-10-14 NOTE — PROGRESS NOTES
VSS. Patient given discharge instructions. Medications and follow-up appointments reviewed. IV  removed. Case management, provider, and primary nurse aware of discharge. Discharge plan of care/case management plan validated with provider discharge order. Patient walked out to car.

## 2022-10-14 NOTE — PROGRESS NOTES
9557: Chart reviewed. Per MD note, patient POD#1 Cystoscopy with double-J stent placement on the left. Current Dispo: Home Self-Care. : Giovani Coleman [phone]. CM will continue to follow patient and recs of medical team.    6947: Discharge summary/order noted. Patient to discharge home self-care when transportation available. Discharge plan of care/case management plan validated with provider discharge order. Discharge Checklist Complete.

## 2022-10-14 NOTE — DISCHARGE INSTRUCTIONS
Discharge Instructions       PATIENT ID: Eloisa Gross  MRN: [id number]   YOB: 1985    DATE OF ADMISSION: [unfilled]    DATE OF DISCHARGE: 10/14/2022    PRIMARY CARE PROVIDER: @PCP@     ATTENDING PHYSICIAN: [unfilled]  DISCHARGING PROVIDER: Rachelle Franks MD    To contact this individual call [phone] and ask the  to page. If unavailable ask to be transferred the Adult Hospitalist Department. DISCHARGE DIAGNOSES kidney stones    CONSULTATIONS: @DE@    PROCEDURES/SURGERIES: Procedure(s):  CYSTOSCOPY WITH LEFT URETERAL STENT PLACEMENT    PENDING TEST RESULTS:   At the time of discharge the following test results are still pending: None    FOLLOW UP APPOINTMENTS:   [unfilled]     ADDITIONAL CARE RECOMMENDATIONS: Follow-up with urology    DIET: Regular diet      ACTIVITY: {discharge activity:25150}    Wound care: {[facility] Wound Care Instructions:92073}    EQUIPMENT needed: ***      DISCHARGE MEDICATIONS:   See Medication Reconciliation Form    It is important that you take the medication exactly as they are prescribed. Keep your medication in the bottles provided by the pharmacist and keep a list of the medication names, dosages, and times to be taken in your wallet. Do not take other medications without consulting your doctor. NOTIFY YOUR PHYSICIAN FOR ANY OF THE FOLLOWING:   Fever over 101 degrees for 24 hours. Chest pain, shortness of breath, fever, chills, nausea, vomiting, diarrhea, change in mentation, falling, weakness, bleeding. Severe pain or pain not relieved by medications. Or, any other signs or symptoms that you may have questions about.       DISPOSITION:    Home With:   OT  PT  HH  RN       SNF/Inpatient Rehab/LTAC    Independent/assisted living    Hospice    Other:         PROBLEM LIST Updated:  Yes ***       Signed:   Rachelle Franks MD  10/14/2022  11:30 AM

## 2022-10-14 NOTE — PROGRESS NOTES
Problem: Falls - Risk of  Goal: *Absence of Falls  Description: Document Ashtyn Skill Fall Risk and appropriate interventions in the flowsheet.   Outcome: Progressing Towards Goal  Note: Fall Risk Interventions:            Medication Interventions: Bed/chair exit alarm, Evaluate medications/consider consulting pharmacy, Patient to call before getting OOB, Utilize gait belt for transfers/ambulation                   Problem: Patient Education: Go to Patient Education Activity  Goal: Patient/Family Education  Outcome: Progressing Towards Goal     Problem: Pain  Goal: *Control of Pain  Outcome: Progressing Towards Goal  Goal: *PALLIATIVE CARE:  Alleviation of Pain  Outcome: Progressing Towards Goal     Problem: Patient Education: Go to Patient Education Activity  Goal: Patient/Family Education  Outcome: Progressing Towards Goal

## 2022-10-14 NOTE — PROGRESS NOTES
History and Physical    NAME: Larissa Oates   :  1985   MRN:  [id number]     Date/Time:  10/14/2022 12:52 PM    Patient PCP: Umer Garica MD  ______________________________________________________________________             Subjective:     CHIEF COMPLAINT:     Left flank pain for last 1 week        HISTORY OF PRESENT ILLNESS:       Patient is a 40y.o. year old female past medical history of renal stone status post lithotripsy came to the ER with 1 week history of left flank pain today getting more worse seen by the ER physician CT scan of the abdomen done Shows  IMPRESSION  1. Obstructing mid left ureteral calculus (7 x 12 mm). Moderate to severe  hydronephrosis. 2. Multiple, additional, nonobstructing, bilateral renal calculi    Patient was admitted urology was consulted    10/14    Pt has a cystoscopy with double-J stent placement on the left yesterday without complication. Pt reports feeling much better today with some soreness from the stent placement. Tolerating foods and liquids.  Denies fever, chills, chest pain, SOB, N/V/D.     UA  Appearance Turbid  Protein 100   Blood Moderate  Leukocyte esterase Moderate  Mucus Trace      Past Medical History:   Diagnosis Date    Kidney calculi         Past Surgical History:   Procedure Laterality Date    HX  SECTION      HX LITHOTRIPSY      \"multiple times but 2017 was most recent\"    HX LITHOTRIPSY      HX OTHER SURGICAL  2020    intrauterine device (IUD)    HX TUBAL LIGATION      HX UROLOGICAL  10/12/2021    CYSTOSCOPY, URETEROSCOPY WITH LASER LITHOTRIPSY,     HX UROLOGICAL  10/12/2021    BILATERAL RETROGRADE PYELOGRAM;     HX UROLOGICAL  10/12/2021    LEFT URETERAL STENT PLACEMENT     HX UROLOGICAL  10/27/2021    cystoscopy stent remove    HX UROLOGICAL  2022    cystoscopy    HX UROLOGICAL  2022    Right retrograde,     HX UROLOGICAL Right 2022    right diagnostic ureteroscopy       Social History     Tobacco Use Smoking status: Every Day     Packs/day: 0.50     Years: 10.00     Pack years: 5.00     Types: Cigarettes    Smokeless tobacco: Current   Substance Use Topics    Alcohol use: No     Comment: rarely        Family History   Problem Relation Age of Onset    Uterine Cancer Maternal Grandmother     Diabetes Paternal Grandmother     Liver Cancer Paternal Grandfather        Allergies   Allergen Reactions    Ciprofloxacin Other (comments)     Bri Granados        Prior to Admission medications    Medication Sig Start Date End Date Taking?  Authorizing Provider   ondansetron hcl (ZOFRAN) 4 mg tablet  10/22/21   Provider, Historical         Current Facility-Administered Medications:     0.9% sodium chloride infusion, 125 mL/hr, IntraVENous, CONTINUOUS, Imani Madrid MD, Last Rate: 125 mL/hr at 10/14/22 0732, 125 mL/hr at 10/14/22 0732    acetaminophen (TYLENOL) tablet 650 mg, 650 mg, Oral, Q6H PRN **OR** acetaminophen (TYLENOL) suppository 650 mg, 650 mg, Rectal, Q6H PRN, Imani Madrid MD    polyethylene glycol (MIRALAX) packet 17 g, 17 g, Oral, DAILY PRN, Imani Madrid MD    ondansetron (ZOFRAN ODT) tablet 4 mg, 4 mg, Oral, Q8H PRN **OR** ondansetron (ZOFRAN) injection 4 mg, 4 mg, IntraVENous, Q6H PRN, Imani Madrid MD    enoxaparin (LOVENOX) injection 30 mg, 30 mg, SubCUTAneous, BID, Imani Madrid MD    HYDROmorphone (DILAUDID) injection 1 mg, 1 mg, IntraVENous, Q4H PRN, Imani Madrid MD, 1 mg at 10/14/22 3351    cefTRIAXone (ROCEPHIN) 1 g in sterile water (preservative free) 10 mL IV syringe, 1 g, IntraVENous, Q24H, Imani Madrid MD    opium-belladonna (B&O 16-A SUPPRETTE) 16.2-60 mg suppository 1 Suppository, 1 Suppository, Rectal, Q8H PRN, Crystal Chavez MD    methenamine hippurate (HIPREX) tablet 1 g, 1 g, Oral, BID, Loras Skeeters, MD, 1 g at 10/13/22 2000    diphenhydrAMINE (BENADRYL) capsule 25 mg, 25 mg, Oral, Q6H PRN, Imani Madrid MD, 25 mg at 10/13/22 1617    ibuprofen (MOTRIN) tablet 400 mg, 400 mg, Oral, Q6H PRN, Imani Madrid MD, 400 mg at 10/13/22 2000    LAB DATA REVIEWED:    Recent Results (from the past 24 hour(s))   URINALYSIS W/ RFLX MICROSCOPIC    Collection Time: 10/13/22  9:44 AM   Result Value Ref Range    Color Yellow/Straw      Appearance Turbid (A) Clear      Specific gravity 1.019 1.003 - 1.030      pH (UA) 6.0 5.0 - 8.0      Protein 100 (A) Negative mg/dL    Glucose Negative Negative mg/dL    Ketone Negative Negative mg/dL    Bilirubin Negative Negative      Blood Moderate (A) Negative      Urobilinogen 0.1 0.1 - 1.0 EU/dL    Nitrites Negative Negative      Leukocyte Esterase Moderate (A) Negative      WBC 20-50 0 - 4 /hpf    RBC 5-10 0 - 5 /hpf    Bacteria Negative Negative /hpf    Mucus Trace /lpf   HCG URINE, QL    Collection Time: 10/13/22  9:44 AM   Result Value Ref Range    HCG urine, QL Negative Negative     URINE MICROSCOPIC    Collection Time: 10/13/22  9:44 AM   Result Value Ref Range    WBC 20-50 0 - 4 /hpf    RBC 5-10 0 - 5 /hpf    Bacteria Negative Negative /hpf    Mucus Trace (A) Negative /lpf   CBC WITH AUTOMATED DIFF    Collection Time: 10/13/22  9:55 AM   Result Value Ref Range    WBC 9.6 3.6 - 11.0 K/uL    RBC 5.04 3.80 - 5.20 M/uL    HGB 14.3 11.5 - 16.0 g/dL    HCT 44.3 35.0 - 47.0 %    MCV 87.9 80.0 - 99.0 FL    MCH 28.4 26.0 - 34.0 PG    MCHC 32.3 30.0 - 36.5 g/dL    RDW 13.4 11.5 - 14.5 %    PLATELET 692 049 - 400 K/uL    MPV 10.8 8.9 - 12.9 FL    NRBC 0.0 0.0  WBC    ABSOLUTE NRBC 0.00 0.00 - 0.01 K/uL    NEUTROPHILS 71 32 - 75 %    LYMPHOCYTES 19 12 - 49 %    MONOCYTES 5 5 - 13 %    EOSINOPHILS 4 0 - 7 %    BASOPHILS 1 0 - 1 %    IMMATURE GRANULOCYTES 0 0 - 0.5 %    ABS. NEUTROPHILS 6.9 1.8 - 8.0 K/UL    ABS. LYMPHOCYTES 1.8 0.8 - 3.5 K/UL    ABS. MONOCYTES 0.4 0.0 - 1.0 K/UL    ABS. EOSINOPHILS 0.4 0.0 - 0.4 K/UL    ABS. BASOPHILS 0.1 0.0 - 0.1 K/UL    ABS. IMM.  GRANS. 0.0 0.00 - 0.04 K/UL    DF AUTOMATED     METABOLIC PANEL, COMPREHENSIVE    Collection Time: 10/13/22  9:55 AM   Result Value Ref Range    Sodium 139 136 - 145 mmol/L    Potassium 4.7 3.5 - 5.1 mmol/L    Chloride 110 (H) 97 - 108 mmol/L    CO2 24 21 - 32 mmol/L    Anion gap 5 5 - 15 mmol/L    Glucose 104 (H) 65 - 100 mg/dL    BUN 18 6 - 20 mg/dL    Creatinine 0.94 0.55 - 1.02 mg/dL    BUN/Creatinine ratio 19 12 - 20      eGFR >60 >60 ml/min/1.73m2    Calcium 9.5 8.5 - 10.1 mg/dL    Bilirubin, total 0.2 0.2 - 1.0 mg/dL    AST (SGOT) 18 15 - 37 U/L    ALT (SGPT) 19 12 - 78 U/L    Alk. phosphatase 149 (H) 45 - 117 U/L    Protein, total 8.1 6.4 - 8.2 g/dL    Albumin 3.8 3.5 - 5.0 g/dL    Globulin 4.3 (H) 2.0 - 4.0 g/dL    A-G Ratio 0.9 (L) 1.1 - 2.2     CULTURE, BLOOD, PAIRED    Collection Time: 10/13/22 12:59 PM    Specimen: Blood   Result Value Ref Range    Special Requests: No Special Requests      Culture result: No growth after 8 hours         XR Results (most recent):  Results from Hospital Encounter encounter on 10/13/22    XR FLUOROSCOPY UNDER 60 MINUTES    Narrative  Compliance only. Impression  Fluoroscopic guidance was provided for the referring clinician. Fluoroscopy time is 21.7 s. XR FLUOROSCOPY UNDER 60 MINUTES   Final Result   Fluoroscopic guidance was provided for the referring clinician. Fluoroscopy time is 21.7 s.            CT ABD PELV WO CONT   Final Result   1. Obstructing mid left ureteral calculus (7 x 12 mm). Moderate to severe   hydronephrosis. 2. Multiple, additional, nonobstructing, bilateral renal calculi. Review of Systems:  Constitutional: Negative for chills and fever. HENT: Negative. Eyes: Negative. Respiratory: Negative. Cardiovascular: Negative. Gastrointestinal: Positive for abdominal pain. Negative for nausea. Skin: Negative. Neurological: Negative.       Objective:   VITALS:    Visit Vitals  BP (!) 110/54 (BP 1 Location: Left upper arm, BP Patient Position: At rest)   Pulse 60   Temp 97.6 °F (36.4 °C)   Resp 16   Ht 5' 5\" (1.651 m)   Wt 102.1 kg (225 lb)   SpO2 96%   BMI 37.44 kg/m²       Physical Exam:   Constitutional: pt is oriented to person, place, and time. HENT:   Head: Normocephalic and atraumatic. Eyes: Pupils are equal, round, and reactive to light. EOM are normal.   Cardiovascular: Normal rate, regular rhythm and normal heart sounds. Pulmonary/Chest: Breath sounds normal. No wheezes. No rales. Exhibits no tenderness. Abdominal: Soft. Bowel sounds are normal. There is no abdominal tenderness. There is no rebound and no guarding. Musculoskeletal: Normal range of motion. Neurological: pt is alert and oriented to person, place, and time. Alert. Normal strength. No cranial nerve deficit or sensory deficit. Displays a negative Romberg sign. ASSESSMENT & PLAN:      1. Obstructing mid left ureteral calculus (7 x 12 mm). Moderate to severe  hydronephrosis.   2. Multiple, additional, nonobstructing, bilateral renal calculi    Preliminary results blood culture show no growth after 8 hours   IV fluids  Continue IV Rocephin  Methenamine hippurate (HIPREX) 1 g BID until 10/23/22       Current Facility-Administered Medications:     0.9% sodium chloride infusion, 125 mL/hr, IntraVENous, CONTINUOUS, Imani Madrid MD, Last Rate: 125 mL/hr at 10/14/22 0732, 125 mL/hr at 10/14/22 0732    acetaminophen (TYLENOL) tablet 650 mg, 650 mg, Oral, Q6H PRN **OR** acetaminophen (TYLENOL) suppository 650 mg, 650 mg, Rectal, Q6H PRN, Imani Madrid MD    polyethylene glycol (MIRALAX) packet 17 g, 17 g, Oral, DAILY PRN, Imani Madrid MD    ondansetron (ZOFRAN ODT) tablet 4 mg, 4 mg, Oral, Q8H PRN **OR** ondansetron (ZOFRAN) injection 4 mg, 4 mg, IntraVENous, Q6H PRN, Imani Madrid MD    enoxaparin (LOVENOX) injection 30 mg, 30 mg, SubCUTAneous, BID, Imani Madrid MD    HYDROmorphone (DILAUDID) injection 1 mg, 1 mg, IntraVENous, Q4H PRN, Imani Madrid MD, 1 mg at 10/14/22 0197 cefTRIAXone (ROCEPHIN) 1 g in sterile water (preservative free) 10 mL IV syringe, 1 g, IntraVENous, Q24H, Imani Madrid MD    opium-belladonna (B&O 16-A SUPPRETTE) 16.2-60 mg suppository 1 Suppository, 1 Suppository, Rectal, Q8H PRN, Devan Gusman MD    methenamine hippurate (HIPREX) tablet 1 g, 1 g, Oral, BID, Devan Gusman MD, 1 g at 10/13/22 2000    diphenhydrAMINE (BENADRYL) capsule 25 mg, 25 mg, Oral, Q6H PRN, Imani Madrid MD, 25 mg at 10/13/22 1617    ibuprofen (MOTRIN) tablet 400 mg, 400 mg, Oral, Q6H PRN, Imani Madrid MD, 400 mg at 10/13/22 2000       ________________________________________________________________________    Signed: Amrita Higgins

## 2022-10-17 ENCOUNTER — TELEPHONE (OUTPATIENT)
Dept: UROLOGY | Age: 37
End: 2022-10-17

## 2022-10-17 NOTE — TELEPHONE ENCOUNTER
Pt needs a 1 week post op stent removal after her surgery she had with Dr. Christelle Moreno on 10/13

## 2022-10-17 NOTE — ED PROVIDER NOTES
EMERGENCY DEPARTMENT HISTORY AND PHYSICAL EXAM      Date: 10/13/2022  Patient Name: Larissa Park    History of Presenting Illness     Chief Complaint   Patient presents with    Back Pain    Flank Pain    Nausea     left       History Provided By: Patient    HPI: Larissa Park, 40 y.o. female with a past medical history significant No significant past medical history presents to the ED with chief complaint of Back Pain, Flank Pain, and Nausea (left)  . 40-year-old female history of kidney stones presents with significant for pain severe 10 out of 10. Radiation to the groin similar to kidney stones in the past but also has some dysuria. No fevers. No nausea vomiting diarrhea. No medicines prior to arrival.        There are no other complaints, changes, or physical findings at this time. PCP: Mony Chavez MD    Current Outpatient Medications   Medication Sig Dispense Refill    methenamine hippurate (HIPREX) 1 gram tablet Take 1 Tablet by mouth two (2) times a day for 18 doses. 18 Tablet 0    HYDROcodone-acetaminophen (Norco) 5-325 mg per tablet Take 1 Tablet by mouth every six (6) hours as needed for Pain for up to 3 days. Max Daily Amount: 4 Tablets.  10 Tablet 0       Past History     Past Medical History:  Past Medical History:   Diagnosis Date    Kidney calculi        Past Surgical History:  Past Surgical History:   Procedure Laterality Date    HX  SECTION      HX LITHOTRIPSY      \"multiple times but 2017 was most recent\"    HX LITHOTRIPSY      HX OTHER SURGICAL  2020    intrauterine device (IUD)    HX TUBAL LIGATION      HX UROLOGICAL  10/12/2021    CYSTOSCOPY, URETEROSCOPY WITH LASER LITHOTRIPSY,     HX UROLOGICAL  10/12/2021    BILATERAL RETROGRADE PYELOGRAM;     HX UROLOGICAL  10/12/2021    LEFT URETERAL STENT PLACEMENT     HX UROLOGICAL  10/27/2021    cystoscopy stent remove    HX UROLOGICAL  2022    cystoscopy    HX UROLOGICAL  2022    Right retrograde,     HX UROLOGICAL Right 05/12/2022    right diagnostic ureteroscopy       Family History:  Family History   Problem Relation Age of Onset    Uterine Cancer Maternal Grandmother     Diabetes Paternal Grandmother     Liver Cancer Paternal Grandfather        Social History:  Social History     Tobacco Use    Smoking status: Every Day     Packs/day: 0.50     Years: 10.00     Pack years: 5.00     Types: Cigarettes    Smokeless tobacco: Current   Vaping Use    Vaping Use: Never used   Substance Use Topics    Alcohol use: No     Comment: rarely    Drug use: Never       Allergies: Allergies   Allergen Reactions    Ciprofloxacin Other (comments)     Thrush         Review of Systems   Review of Systems   Constitutional: Negative. Negative for chills, fatigue and fever. HENT: Negative. Negative for congestion, nosebleeds and sore throat. Eyes: Negative. Negative for pain, discharge and visual disturbance. Respiratory: Negative. Negative for cough, chest tightness and shortness of breath. Cardiovascular:  Negative for chest pain, palpitations and leg swelling. Gastrointestinal:  Negative for abdominal pain, blood in stool, constipation, diarrhea, nausea and vomiting. Endocrine: Negative. Genitourinary:  Positive for flank pain. Negative for difficulty urinating, dysuria, pelvic pain and vaginal bleeding. Musculoskeletal:  Negative for arthralgias, back pain and myalgias. Skin: Negative. Negative for rash and wound. Allergic/Immunologic: Negative. Neurological: Negative. Negative for dizziness, syncope, weakness, numbness and headaches. Hematological: Negative. Psychiatric/Behavioral: Negative. Negative for agitation, confusion and suicidal ideas. All other systems reviewed and are negative. Physical Exam   Physical Exam  Vitals and nursing note reviewed. Exam conducted with a chaperone present. Constitutional:       Appearance: Normal appearance. She is normal weight. HENT:      Head: Normocephalic and atraumatic. Nose: Nose normal.      Mouth/Throat:      Mouth: Mucous membranes are moist.      Pharynx: Oropharynx is clear. Eyes:      Extraocular Movements: Extraocular movements intact. Conjunctiva/sclera: Conjunctivae normal.      Pupils: Pupils are equal, round, and reactive to light. Cardiovascular:      Rate and Rhythm: Normal rate and regular rhythm. Pulses: Normal pulses. Heart sounds: Normal heart sounds. Pulmonary:      Effort: Pulmonary effort is normal. No respiratory distress. Breath sounds: Normal breath sounds. Abdominal:      General: Abdomen is flat. Bowel sounds are normal. There is no distension. Palpations: Abdomen is soft. Tenderness: There is no abdominal tenderness. There is left CVA tenderness. There is no guarding. Musculoskeletal:         General: No swelling, tenderness, deformity or signs of injury. Normal range of motion. Cervical back: Normal range of motion and neck supple. Right lower leg: No edema. Left lower leg: No edema. Skin:     General: Skin is warm and dry. Capillary Refill: Capillary refill takes less than 2 seconds. Findings: No lesion or rash. Neurological:      General: No focal deficit present. Mental Status: She is alert and oriented to person, place, and time. Mental status is at baseline. Cranial Nerves: No cranial nerve deficit. Psychiatric:         Mood and Affect: Mood normal.         Behavior: Behavior normal.         Thought Content:  Thought content normal.         Judgment: Judgment normal.       Diagnostic Study Results     Labs -   10/16/22 0701  CULTURE, BLOOD, PAIRED   Collected: 10/13/22 1259  Preliminary result  Specimen: Blood     Special Requests: No Special Requests P     Culture result: No growth 3 days P            04/35/60 6345  METABOLIC PANEL, COMPREHENSIVE   Collected: 10/13/22 0908  Final result  Specimen: Serum or Plasma Sodium 139 mmol/L   Potassium 4.7 mmol/L   Chloride 110 High  mmol/L   CO2 24 mmol/L   Anion gap 5 mmol/L   Glucose 104 High  mg/dL   BUN 18 mg/dL   Creatinine 0.94 mg/dL   BUN/Creatinine ratio 19     eGFR >60 ml/min/1.73m2    Calcium 9.5 mg/dL   Bilirubin, total 0.2 mg/dL   AST (SGOT) 18 U/L   ALT (SGPT) 19 U/L   Alk. phosphatase 149 High  U/L   Protein, total 8.1 g/dL   Albumin 3.8 g/dL   Globulin 4.3 High  g/dL   A-G Ratio 0.9 Low             10/13/22 1036  CBC WITH AUTOMATED DIFF   Collected: 10/13/22 0955  Final result  Specimen: Whole Blood     WBC 9.6 K/uL   RBC 5.04 M/uL   HGB 14.3 g/dL   HCT 44.3 %   MCV 87.9 FL   MCH 28.4 PG   MCHC 32.3 g/dL   RDW 13.4 %   PLATELET 662 K/uL   MPV 10.8 FL   NRBC 0.0  WBC   ABSOLUTE NRBC 0.00 K/uL   NEUTROPHILS 71 %   LYMPHOCYTES 19 %   MONOCYTES 5 %   EOSINOPHILS 4 %   BASOPHILS 1 %   IMMATURE GRANULOCYTES 0 %   ABS. NEUTROPHILS 6.9 K/UL   ABS. LYMPHOCYTES 1.8 K/UL   ABS. MONOCYTES 0.4 K/UL   ABS. EOSINOPHILS 0.4 K/UL   ABS. BASOPHILS 0.1 K/UL   ABS. IMM.  GRANS. 0.0 K/UL   DF AUTOMATED            10/13/22 1254  URINE MICROSCOPIC   Collected: 10/13/22 0944  Final result  Specimen: Urine     WBC 20-50 /hpf   RBC 5-10 /hpf   Bacteria Negative /hpf   Mucus Trace Abnormal  /lpf          10/13/22 1254  URINALYSIS W/ RFLX MICROSCOPIC   Collected: 10/13/22 0944  Final result  Specimen: Urine     Color Yellow/Straw      Appearance Turbid Abnormal      Specific gravity 1.019     pH (UA) 6.0     Protein 100 Abnormal  mg/dL   Glucose Negative mg/dL   Ketone Negative mg/dL   Bilirubin Negative     Blood Moderate Abnormal      Urobilinogen 0.1 EU/dL   Nitrites Negative     Leukocyte Esterase Moderate Abnormal      WBC 20-50 /hpf   RBC 5-10 /hpf   Bacteria Negative /hpf   Mucus Trace /lpf          10/13/22 1124  HCG URINE, QL   Collected: 10/13/22 0944  Final result  Specimen: Urine, random     HCG urine, QL Negative           No results found for this or any previous visit (from the past 12 hour(s)). Radiologic Studies -   XR FLUOROSCOPY UNDER 60 MINUTES   Final Result   Fluoroscopic guidance was provided for the referring clinician. Fluoroscopy time is 21.7 s.            CT ABD PELV WO CONT   Final Result   1. Obstructing mid left ureteral calculus (7 x 12 mm). Moderate to severe   hydronephrosis. 2. Multiple, additional, nonobstructing, bilateral renal calculi. CT Results  (Last 48 hours)      None          CXR Results  (Last 48 hours)      None            Medical Decision Making and ED Course   I am the first provider for this patient. I reviewed the vital signs, available nursing notes, past medical history, past surgical history, family history and social history. Vital Signs-Reviewed the patient's vital signs. No data found. EKG interpretation:         Records Reviewed: Previous Hospital chart. EMS run report      ED Course:   Initial assessment performed. The patients presenting problems have been discussed, and they are in agreement with the care plan formulated and outlined with them. I have encouraged them to ask questions as they arise throughout their visit. Orders Placed This Encounter    CULTURE, BLOOD, PAIRED     Standing Status:   Standing     Number of Occurrences:   1    CT ABD PELV WO CONT     Standing Status:   Standing     Number of Occurrences:   1     Order Specific Question:   Transport     Answer:   BED [2]     Order Specific Question:   Is Patient Pregnant? Answer:   Unknown     Order Specific Question:   Reason for Exam     Answer:   L flank pain     Order Specific Question:   Type of contrast.  PLEASE NOTE: IV contrast is NOT utilized with this order.      Answer:   None     Order Specific Question:   Decision Support Exception     Answer:   Emergency Medical Condition (MA) [1]    XR FLUOROSCOPY UNDER 60 MINUTES     Standing Status:   Standing     Number of Occurrences:   1     Order Specific Question:   Transport Answer:   Keri [5]     Order Specific Question:   Reason for Exam     Answer:   stent placement     Order Specific Question:   Is Patient Pregnant? Answer:   Unknown    URINALYSIS W/ RFLX MICROSCOPIC     Standing Status:   Standing     Number of Occurrences:   1    HCG URINE, QL     Standing Status:   Standing     Number of Occurrences:   1    CBC WITH AUTOMATED DIFF     Standing Status:   Standing     Number of Occurrences:   1    METABOLIC PANEL, COMPREHENSIVE     Standing Status:   Standing     Number of Occurrences:   1    URINE MICROSCOPIC     Standing Status:   Standing     Number of Occurrences:   1    METABOLIC PANEL, COMPREHENSIVE     Standing Status:   Standing     Number of Occurrences:   1    CBC WITH AUTOMATED DIFF     Standing Status:   Standing     Number of Occurrences:   1    DISCHARGE PATIENT     Standing Status:   Standing     Number of Occurrences:   1    morphine injection 4 mg    ondansetron (ZOFRAN) injection 4 mg    cefTRIAXone (ROCEPHIN) 1 g in sterile water (preservative free) 10 mL IV syringe     Order Specific Question:   Antibiotic Indications     Answer:   Urinary Tract Infection    sodium chloride 0.9 % bolus infusion 1,000 mL    HYDROmorphone (DILAUDID) injection 1 mg    DISCONTD: ketorolac (TORADOL) tablet 10 mg     OP SIG:Take 1 Tablet by mouth every six (6) hours as needed for Pain.       DISCONTD: 0.9% sodium chloride infusion    DISCONTD: acetaminophen (TYLENOL) tablet 650 mg    DISCONTD: acetaminophen (TYLENOL) suppository 650 mg    DISCONTD: polyethylene glycol (MIRALAX) packet 17 g    DISCONTD: ondansetron (ZOFRAN ODT) tablet 4 mg    DISCONTD: ondansetron (ZOFRAN) injection 4 mg    DISCONTD: enoxaparin (LOVENOX) injection 30 mg    DISCONTD: HYDROmorphone (DILAUDID) injection 1 mg    DISCONTD: cefTRIAXone (ROCEPHIN) 1 g in sterile water (preservative free) 10 mL IV syringe     Order Specific Question:   Antibiotic Indications     Answer:   Urinary Tract Infection Order Specific Question:   UTI duration of therapy     Answer:   3 days    DISCONTD: cefTRIAXone (ROCEPHIN) 1 g in sterile water (preservative free) 10 mL IV syringe     Order Specific Question:   Antibiotic Indications     Answer:   Urinary Tract Infection     Order Specific Question:   UTI duration of therapy     Answer:   3 days    DISCONTD: opium-belladonna (B&O 16-A SUPPRETTE) 16.2-60 mg suppository 1 Suppository     Please verify order to be pulled from the pacu omnicel    DISCONTD: sodium chloride (NS) flush 5-40 mL    DISCONTD: sodium chloride (NS) flush 5-40 mL    DISCONTD: lactated Ringers infusion    DISCONTD: sodium chloride (NS) flush 5-40 mL    DISCONTD: sodium chloride (NS) flush 5-40 mL    DISCONTD: fentaNYL citrate (PF) injection 25 mcg    DISCONTD: ondansetron (ZOFRAN) injection 4 mg    DISCONTD: ePHEDrine in NS (PF) (MISTOLE) 10 mg/mL in NS syringe 5 mg    DISCONTD: opium-belladonna (B&O 16-A SUPPRETTE) 16.2-60 mg suppository    DISCONTD: methenamine hippurate (HIPREX) tablet 1 g     Order Specific Question:   Antibiotic Indications     Answer:   Urinary Tract Infection     Order Specific Question:   UTI duration of therapy     Answer:   10 days    DISCONTD: diphenhydrAMINE (BENADRYL) capsule 25 mg    DISCONTD: ibuprofen (MOTRIN) tablet 400 mg    methenamine hippurate (HIPREX) 1 gram tablet     Sig: Take 1 Tablet by mouth two (2) times a day for 18 doses. Dispense:  18 Tablet     Refill:  0    HYDROcodone-acetaminophen (Norco) 5-325 mg per tablet     Sig: Take 1 Tablet by mouth every six (6) hours as needed for Pain for up to 3 days. Max Daily Amount: 4 Tablets. Dispense:  10 Tablet     Refill:  0    INITIAL PHYSICIAN ORDER: INPATIENT Medical; 3. Patient receiving treatment that can only be provided in an inpatient setting (further clarification in H&P documentation)     Standing Status:   Standing     Number of Occurrences:   1     Order Specific Question:   Status:      Answer: INPATIENT [101]     Order Specific Question:   Type of Bed     Answer:   Medical [8]     Order Specific Question:   Inpatient Hospitalization Certified Necessary for the Following Reasons     Answer:   3. Patient receiving treatment that can only be provided in an inpatient setting (further clarification in H&P documentation)     Order Specific Question:   Admitting Diagnosis     Answer:   Kidney stone [673747]     Order Specific Question:   Admitting Diagnosis     Answer:   UTI (urinary tract infection) [711491]     Order Specific Question:   Admitting Physician     Answer:   Erendira Fisher     Order Specific Question:   Attending Physician     Answer:   Erendira Fisher     Order Specific Question:   Estimated Length of Stay     Answer:   2 Midnights     Order Specific Question:   Discharge Plan:     Answer:   Home with Office Follow-up    INITIAL PHYSICIAN ORDER: INPATIENT Medical; 3. Patient receiving treatment that can only be provided in an inpatient setting (further clarification in H&P documentation)     Standing Status:   Standing     Number of Occurrences:   1     Order Specific Question:   Status: Answer:   INPATIENT [101]     Order Specific Question:   Type of Bed     Answer:   Medical [8]     Order Specific Question:   Inpatient Hospitalization Certified Necessary for the Following Reasons     Answer:   3. Patient receiving treatment that can only be provided in an inpatient setting (further clarification in H&P documentation)     Order Specific Question:   Admitting Diagnosis     Answer:   Kidney stones [759083]     Order Specific Question:   Admitting Diagnosis     Answer:   Hydronephrosis [591. ICD-9-CM]     Order Specific Question:   Admitting Physician     Answer:   Parker Silverio [[id number]]     Order Specific Question:   Attending Physician     Answer:   Parker Silverio [[id number]]     Order Specific Question:   Estimated Length of Stay     Answer:   2 Midnights     Order Specific Question:   Discharge Plan:     Answer:   Home with Office Follow-up                 Provider Notes (Medical Decision Making):   40 female presents with severe flank pain does have a very large kidney stone with UTI. Discussed the case with urology who wants her to be n.p.o. will consider stent placement today. Admission. Consults    Camilo Madrid admit          Admitted    Procedures               Disposition       Emergency Department Disposition:  Admitted      Diagnosis     Clinical Impression:   1. Pyelonephritis        Attestations:    Shekhar Hobbs MD    Please note that this dictation was completed with [employer], the computer voice recognition software. Quite often unanticipated grammatical, syntax, homophones, and other interpretive errors are inadvertently transcribed by the computer software. Please disregard these errors. Please excuse any errors that have escaped final proofreading. Thank you.

## 2022-10-18 ENCOUNTER — HOSPITAL ENCOUNTER (INPATIENT)
Age: 37
LOS: 2 days | Discharge: HOME | DRG: 465 | End: 2022-10-20
Attending: EMERGENCY MEDICINE | Admitting: FAMILY MEDICINE
Payer: MEDICAID

## 2022-10-18 ENCOUNTER — APPOINTMENT (OUTPATIENT)
Dept: CT IMAGING | Age: 37
DRG: 465 | End: 2022-10-18
Attending: EMERGENCY MEDICINE
Payer: MEDICAID

## 2022-10-18 DIAGNOSIS — N12 PYELONEPHRITIS: ICD-10-CM

## 2022-10-18 DIAGNOSIS — N20.0 KIDNEY STONE ON LEFT SIDE: Primary | ICD-10-CM

## 2022-10-18 LAB
ANION GAP SERPL CALC-SCNC: 7 MMOL/L (ref 5–15)
APPEARANCE UR: ABNORMAL
BACTERIA URNS QL MICRO: NEGATIVE /HPF
BACTERIA URNS QL MICRO: NEGATIVE /HPF
BASOPHILS # BLD: 0.1 K/UL (ref 0–0.1)
BASOPHILS NFR BLD: 0 % (ref 0–1)
BILIRUB UR QL: NEGATIVE
BUN SERPL-MCNC: 14 MG/DL (ref 6–20)
BUN/CREAT SERPL: 15 (ref 12–20)
CA-I BLD-MCNC: 9.2 MG/DL (ref 8.5–10.1)
CHLORIDE SERPL-SCNC: 107 MMOL/L (ref 97–108)
CO2 SERPL-SCNC: 24 MMOL/L (ref 21–32)
COLOR UR: ABNORMAL
CREAT SERPL-MCNC: 0.96 MG/DL (ref 0.55–1.02)
DIFFERENTIAL METHOD BLD: ABNORMAL
EOSINOPHIL # BLD: 0.4 K/UL (ref 0–0.4)
EOSINOPHIL NFR BLD: 4 % (ref 0–7)
ERYTHROCYTE [DISTWIDTH] IN BLOOD BY AUTOMATED COUNT: 13.4 % (ref 11.5–14.5)
GLUCOSE SERPL-MCNC: 130 MG/DL (ref 65–100)
GLUCOSE UR STRIP.AUTO-MCNC: NEGATIVE MG/DL
HCG UR QL: NEGATIVE
HCT VFR BLD AUTO: 42.8 % (ref 35–47)
HGB BLD-MCNC: 14 G/DL (ref 11.5–16)
HGB UR QL STRIP: ABNORMAL
IMM GRANULOCYTES # BLD AUTO: 0.1 K/UL (ref 0–0.04)
IMM GRANULOCYTES NFR BLD AUTO: 1 % (ref 0–0.5)
KETONES UR QL STRIP.AUTO: NEGATIVE MG/DL
LEUKOCYTE ESTERASE UR QL STRIP.AUTO: ABNORMAL
LYMPHOCYTES # BLD: 2.8 K/UL (ref 0.8–3.5)
LYMPHOCYTES NFR BLD: 22 % (ref 12–49)
MCH RBC QN AUTO: 28.7 PG (ref 26–34)
MCHC RBC AUTO-ENTMCNC: 32.7 G/DL (ref 30–36.5)
MCV RBC AUTO: 87.7 FL (ref 80–99)
MONOCYTES # BLD: 0.5 K/UL (ref 0–1)
MONOCYTES NFR BLD: 4 % (ref 5–13)
NEUTS SEG # BLD: 8.7 K/UL (ref 1.8–8)
NEUTS SEG NFR BLD: 69 % (ref 32–75)
NITRITE UR QL STRIP.AUTO: NEGATIVE
NRBC # BLD: 0 K/UL (ref 0–0.01)
NRBC BLD-RTO: 0 PER 100 WBC
PH UR STRIP: 6 [PH] (ref 5–8)
PLATELET # BLD AUTO: 400 K/UL (ref 150–400)
PMV BLD AUTO: 10.5 FL (ref 8.9–12.9)
POTASSIUM SERPL-SCNC: 3.9 MMOL/L (ref 3.5–5.1)
PROT UR STRIP-MCNC: 100 MG/DL
RBC # BLD AUTO: 4.88 M/UL (ref 3.8–5.2)
RBC #/AREA URNS HPF: >100 /HPF (ref 0–5)
RBC #/AREA URNS HPF: >100 /HPF (ref 0–5)
SODIUM SERPL-SCNC: 138 MMOL/L (ref 136–145)
SP GR UR REFRACTOMETRY: 1.02 (ref 1–1.03)
UROBILINOGEN UR QL STRIP.AUTO: 0.1 EU/DL (ref 0.1–1)
WBC # BLD AUTO: 12.5 K/UL (ref 3.6–11)
WBC URNS QL MICRO: >100 /HPF (ref 0–4)
WBC URNS QL MICRO: >100 /HPF (ref 0–4)

## 2022-10-18 PROCEDURE — 81001 URINALYSIS AUTO W/SCOPE: CPT

## 2022-10-18 PROCEDURE — 65270000029 HC RM PRIVATE

## 2022-10-18 PROCEDURE — 36415 COLL VENOUS BLD VENIPUNCTURE: CPT

## 2022-10-18 PROCEDURE — 96375 TX/PRO/DX INJ NEW DRUG ADDON: CPT

## 2022-10-18 PROCEDURE — 74011000250 HC RX REV CODE- 250: Performed by: FAMILY MEDICINE

## 2022-10-18 PROCEDURE — 99285 EMERGENCY DEPT VISIT HI MDM: CPT

## 2022-10-18 PROCEDURE — 96374 THER/PROPH/DIAG INJ IV PUSH: CPT

## 2022-10-18 PROCEDURE — 80048 BASIC METABOLIC PNL TOTAL CA: CPT

## 2022-10-18 PROCEDURE — 74011250636 HC RX REV CODE- 250/636: Performed by: FAMILY MEDICINE

## 2022-10-18 PROCEDURE — 85025 COMPLETE CBC W/AUTO DIFF WBC: CPT

## 2022-10-18 PROCEDURE — 96376 TX/PRO/DX INJ SAME DRUG ADON: CPT

## 2022-10-18 PROCEDURE — 74176 CT ABD & PELVIS W/O CONTRAST: CPT

## 2022-10-18 PROCEDURE — 81025 URINE PREGNANCY TEST: CPT

## 2022-10-18 PROCEDURE — 74011250636 HC RX REV CODE- 250/636: Performed by: EMERGENCY MEDICINE

## 2022-10-18 PROCEDURE — 74011250637 HC RX REV CODE- 250/637: Performed by: FAMILY MEDICINE

## 2022-10-18 PROCEDURE — 87040 BLOOD CULTURE FOR BACTERIA: CPT

## 2022-10-18 RX ORDER — ONDANSETRON 4 MG/1
4 TABLET, ORALLY DISINTEGRATING ORAL
Status: DISCONTINUED | OUTPATIENT
Start: 2022-10-18 | End: 2022-10-20 | Stop reason: HOSPADM

## 2022-10-18 RX ORDER — KETOROLAC TROMETHAMINE 30 MG/ML
15 INJECTION, SOLUTION INTRAMUSCULAR; INTRAVENOUS
Status: COMPLETED | OUTPATIENT
Start: 2022-10-18 | End: 2022-10-18

## 2022-10-18 RX ORDER — POLYETHYLENE GLYCOL 3350 17 G/17G
17 POWDER, FOR SOLUTION ORAL DAILY PRN
Status: DISCONTINUED | OUTPATIENT
Start: 2022-10-18 | End: 2022-10-20 | Stop reason: HOSPADM

## 2022-10-18 RX ORDER — ACETAMINOPHEN 325 MG/1
650 TABLET ORAL
Status: DISCONTINUED | OUTPATIENT
Start: 2022-10-18 | End: 2022-10-20 | Stop reason: HOSPADM

## 2022-10-18 RX ORDER — SODIUM CHLORIDE 9 MG/ML
75 INJECTION, SOLUTION INTRAVENOUS CONTINUOUS
Status: DISCONTINUED | OUTPATIENT
Start: 2022-10-18 | End: 2022-10-20 | Stop reason: HOSPADM

## 2022-10-18 RX ORDER — METHENAMINE HIPPURATE 1000 MG/1
1 TABLET ORAL 2 TIMES DAILY
Status: DISCONTINUED | OUTPATIENT
Start: 2022-10-18 | End: 2022-10-20 | Stop reason: HOSPADM

## 2022-10-18 RX ORDER — ENOXAPARIN SODIUM 100 MG/ML
40 INJECTION SUBCUTANEOUS DAILY
Status: DISCONTINUED | OUTPATIENT
Start: 2022-10-19 | End: 2022-10-20 | Stop reason: HOSPADM

## 2022-10-18 RX ORDER — ONDANSETRON 2 MG/ML
4 INJECTION INTRAMUSCULAR; INTRAVENOUS
Status: DISCONTINUED | OUTPATIENT
Start: 2022-10-18 | End: 2022-10-20 | Stop reason: HOSPADM

## 2022-10-18 RX ORDER — MORPHINE SULFATE 4 MG/ML
4 INJECTION INTRAVENOUS ONCE
Status: COMPLETED | OUTPATIENT
Start: 2022-10-18 | End: 2022-10-18

## 2022-10-18 RX ORDER — OXYCODONE HYDROCHLORIDE 10 MG/1
10 TABLET ORAL
Status: DISCONTINUED | OUTPATIENT
Start: 2022-10-18 | End: 2022-10-20 | Stop reason: HOSPADM

## 2022-10-18 RX ORDER — ACETAMINOPHEN 650 MG/1
650 SUPPOSITORY RECTAL
Status: DISCONTINUED | OUTPATIENT
Start: 2022-10-18 | End: 2022-10-20 | Stop reason: HOSPADM

## 2022-10-18 RX ORDER — HYDROCODONE BITARTRATE AND ACETAMINOPHEN 5; 325 MG/1; MG/1
1 TABLET ORAL
Status: DISCONTINUED | OUTPATIENT
Start: 2022-10-18 | End: 2022-10-18

## 2022-10-18 RX ADMIN — OXYCODONE HYDROCHLORIDE 10 MG: 10 TABLET ORAL at 23:29

## 2022-10-18 RX ADMIN — METHENAMINE HIPPURATE 1 G: 1 TABLET ORAL at 23:29

## 2022-10-18 RX ADMIN — SODIUM CHLORIDE 1000 ML: 9 INJECTION, SOLUTION INTRAVENOUS at 17:47

## 2022-10-18 RX ADMIN — MORPHINE SULFATE 4 MG: 4 INJECTION INTRAVENOUS at 17:48

## 2022-10-18 RX ADMIN — CEFTRIAXONE SODIUM 1 G: 1 INJECTION, POWDER, FOR SOLUTION INTRAMUSCULAR; INTRAVENOUS at 21:07

## 2022-10-18 RX ADMIN — KETOROLAC TROMETHAMINE 15 MG: 30 INJECTION, SOLUTION INTRAMUSCULAR at 17:48

## 2022-10-18 RX ADMIN — SODIUM CHLORIDE 75 ML/HR: 9 INJECTION, SOLUTION INTRAVENOUS at 23:30

## 2022-10-18 RX ADMIN — MORPHINE SULFATE 4 MG: 4 INJECTION, SOLUTION INTRAMUSCULAR; INTRAVENOUS at 18:53

## 2022-10-18 NOTE — Clinical Note
Status[de-identified] INPATIENT [101]   Type of Bed: Medical [8]   Cardiac Monitoring Required?: No   Inpatient Hospitalization Certified Necessary for the Following Reasons: 3.  Patient receiving treatment that can only be provided in an inpatient setting (further clarification in H&P documentation)   Admitting Diagnosis: Pyelonephritis [508690]   Admitting Physician: Deidre Berman [[id number]]   Attending Physician: Deidre Berman [[id number]]   Estimated Length of Stay: 2 Midnights   Discharge Plan[de-identified] Home with Office Follow-up

## 2022-10-18 NOTE — ED PROVIDER NOTES
EMERGENCY DEPARTMENT HISTORY AND PHYSICAL EXAM      Date: 10/18/2022  Patient Name: Franky Hardy      History of Presenting Illness     Chief Complaint   Patient presents with    Flank Pain       History Provided By: Patient    HPI: Franky Hardy, 40 y.o. female with a past medical history significant  kidney stones  presents to the ED with cc of left flank pain. Patient states that she was recently diagnosed with a large kidney stone on the left. She had a stent placed on Thursday. She was taking pain medication and doing well but the pain has gotten worse and now she is also noticed increased hematuria. She is also had chills and low-grade fever since the pain worsened. She is currently taking methenamine. Plan for lithotripsy next week. Has had nausea but no vomiting. Has otherwise been in her normal state of health. Is tolerating p.o. There are no other complaints, changes, or physical findings at this time. PCP: Marya Anders MD    Current Facility-Administered Medications   Medication Dose Route Frequency Provider Last Rate Last Admin    cefTRIAXone (ROCEPHIN) 1 g in sterile water (preservative free) 10 mL IV syringe  1 g IntraVENous NOW Nelda Tomlin MD         Current Outpatient Medications   Medication Sig Dispense Refill    methenamine hippurate (HIPREX) 1 gram tablet Take 1 Tablet by mouth two (2) times a day for 18 doses.  18 Tablet 0       Past History     Past Medical History:  Past Medical History:   Diagnosis Date    Kidney calculi        Past Surgical History:  Past Surgical History:   Procedure Laterality Date    HX  SECTION      HX LITHOTRIPSY      \"multiple times but 2017 was most recent\"    HX LITHOTRIPSY      HX OTHER SURGICAL  2020    intrauterine device (IUD)    HX TUBAL LIGATION      HX UROLOGICAL  10/12/2021    CYSTOSCOPY, URETEROSCOPY WITH LASER LITHOTRIPSY,     HX UROLOGICAL  10/12/2021    BILATERAL RETROGRADE PYELOGRAM;     HX UROLOGICAL 10/12/2021    LEFT URETERAL STENT PLACEMENT     HX UROLOGICAL  10/27/2021    cystoscopy stent remove    HX UROLOGICAL  05/03/2022    cystoscopy    HX UROLOGICAL  05/12/2022    Right retrograde,     HX UROLOGICAL Right 05/12/2022    right diagnostic ureteroscopy    HX UROLOGICAL Left 10/13/2022    Cystoscopy with double-J stent placement on the left       Family History:  Family History   Problem Relation Age of Onset    Uterine Cancer Maternal Grandmother     Diabetes Paternal Grandmother     Liver Cancer Paternal Grandfather        Social History:  Social History     Tobacco Use    Smoking status: Every Day     Packs/day: 0.50     Years: 10.00     Pack years: 5.00     Types: Cigarettes    Smokeless tobacco: Current   Vaping Use    Vaping Use: Never used   Substance Use Topics    Alcohol use: No     Comment: rarely    Drug use: Never       Allergies: Allergies   Allergen Reactions    Ciprofloxacin Other (comments)     Thrush         Review of Systems     Review of Systems   Constitutional:  Negative for activity change and fever. HENT:  Negative for rhinorrhea and sore throat. Eyes:  Negative for visual disturbance. Respiratory:  Negative for cough. Cardiovascular:  Negative for chest pain. Gastrointestinal:  Negative for abdominal pain, diarrhea, nausea and vomiting. Genitourinary:  Positive for flank pain and hematuria. Negative for dysuria. Musculoskeletal:  Negative for arthralgias and myalgias. Skin:  Negative for rash and wound. Neurological:  Negative for syncope and headaches. Psychiatric/Behavioral:  Negative for confusion. All other systems reviewed and are negative. Physical Exam     Physical Exam  Vitals and nursing note reviewed. Constitutional:       General: She is in acute distress. Appearance: Normal appearance. She is normal weight. She is not toxic-appearing. HENT:      Head: Normocephalic and atraumatic.       Nose: Nose normal.      Mouth/Throat:      Mouth: Mucous membranes are moist.   Eyes:      Conjunctiva/sclera: Conjunctivae normal.   Cardiovascular:      Rate and Rhythm: Normal rate. Pulses: Normal pulses. Pulmonary:      Effort: Pulmonary effort is normal. No respiratory distress. Abdominal:      Palpations: Abdomen is soft. Tenderness: There is no abdominal tenderness. There is left CVA tenderness. There is no right CVA tenderness, guarding or rebound. Musculoskeletal:         General: No swelling or deformity. Normal range of motion. Skin:     General: Skin is warm and dry. Findings: No rash. Neurological:      General: No focal deficit present. Mental Status: She is alert and oriented to person, place, and time. Sensory: No sensory deficit. Motor: No weakness. Psychiatric:         Mood and Affect: Mood normal.         Behavior: Behavior normal.       Lab and Diagnostic Study Results     Labs -     Recent Results (from the past 12 hour(s))   CBC WITH AUTOMATED DIFF    Collection Time: 10/18/22  5:00 PM   Result Value Ref Range    WBC 12.5 (H) 3.6 - 11.0 K/uL    RBC 4.88 3.80 - 5.20 M/uL    HGB 14.0 11.5 - 16.0 g/dL    HCT 42.8 35.0 - 47.0 %    MCV 87.7 80.0 - 99.0 FL    MCH 28.7 26.0 - 34.0 PG    MCHC 32.7 30.0 - 36.5 g/dL    RDW 13.4 11.5 - 14.5 %    PLATELET 404 824 - 799 K/uL    MPV 10.5 8.9 - 12.9 FL    NRBC 0.0 0.0  WBC    ABSOLUTE NRBC 0.00 0.00 - 0.01 K/uL    NEUTROPHILS 69 32 - 75 %    LYMPHOCYTES 22 12 - 49 %    MONOCYTES 4 (L) 5 - 13 %    EOSINOPHILS 4 0 - 7 %    BASOPHILS 0 0 - 1 %    IMMATURE GRANULOCYTES 1 (H) 0 - 0.5 %    ABS. NEUTROPHILS 8.7 (H) 1.8 - 8.0 K/UL    ABS. LYMPHOCYTES 2.8 0.8 - 3.5 K/UL    ABS. MONOCYTES 0.5 0.0 - 1.0 K/UL    ABS. EOSINOPHILS 0.4 0.0 - 0.4 K/UL    ABS. BASOPHILS 0.1 0.0 - 0.1 K/UL    ABS. IMM.  GRANS. 0.1 (H) 0.00 - 0.04 K/UL    DF AUTOMATED     METABOLIC PANEL, BASIC    Collection Time: 10/18/22  5:00 PM   Result Value Ref Range    Sodium 138 136 - 145 mmol/L Potassium 3.9 3.5 - 5.1 mmol/L    Chloride 107 97 - 108 mmol/L    CO2 24 21 - 32 mmol/L    Anion gap 7 5 - 15 mmol/L    Glucose 130 (H) 65 - 100 mg/dL    BUN 14 6 - 20 mg/dL    Creatinine 0.96 0.55 - 1.02 mg/dL    BUN/Creatinine ratio 15 12 - 20      eGFR >60 >60 ml/min/1.73m2    Calcium 9.2 8.5 - 10.1 mg/dL   URINALYSIS W/ RFLX MICROSCOPIC    Collection Time: 10/18/22  5:00 PM   Result Value Ref Range    Color Yellow/Straw      Appearance Turbid (A) Clear      Specific gravity 1.017 1.003 - 1.030      pH (UA) 6.0 5.0 - 8.0      Protein 100 (A) Negative mg/dL    Glucose Negative Negative mg/dL    Ketone Negative Negative mg/dL    Bilirubin Negative Negative      Blood Large (A) Negative      Urobilinogen 0.1 0.1 - 1.0 EU/dL    Nitrites Negative Negative      Leukocyte Esterase Large (A) Negative      WBC >100 (H) 0 - 4 /hpf    RBC >100 (H) 0 - 5 /hpf    Bacteria Negative Negative /hpf   HCG URINE, QL    Collection Time: 10/18/22  5:00 PM   Result Value Ref Range    HCG urine, QL Negative Negative     URINE MICROSCOPIC    Collection Time: 10/18/22  5:00 PM   Result Value Ref Range    WBC >100 (H) 0 - 4 /hpf    RBC >100 (H) 0 - 5 /hpf    Bacteria Negative Negative /hpf       Radiologic Studies -   [unfilled]  CT Results  (Last 48 hours)                 10/18/22 1814  CT ABD PELV WO CONT Final result    Impression:  1. There is a left-sided, double-J ureteral stent which appears reconstituted in   the upper pole calyx and in the bladder trigone. 2. There is a calculus in the proximal left ureter. 3. Bilateral nephrolithiasis. 4. Incidental findings as above. Narrative:  EXAM:  CT ABDOMEN PELVIS WITHOUT CONTRAST   INDICATION:  eval for hydro, pyelonephritis. Additional history:   COMPARISON: CT of the abdomen and pelvis, 10/13/2022 and 5/2/2022. .   TECHNIQUE:    Unenhanced multislice helical CT was performed from the diaphragm to the   symphysis pubis without intravenous contrast administration. Contiguous 5 mm   axial images were reconstructed and lung and soft tissue windows were generated. Coronal and sagittal reformations were generated. CT dose reduction was achieved through use of a standardized protocol tailored   for this examination and automatic exposure control for dose modulation. Derick Valente FINDINGS:   INCIDENTALLY IMAGED CHEST:   Heart/vessels: Within normal limits. Lungs/Pleura: Within normal limits. .   ABDOMEN:   Liver: Within normal limits. Gallbladder/Biliary: High attenuation material in the dependent portion of the   gallbladder. Spleen: Within normal limits. Pancreas: Within normal limits. Adrenals: Within normal limits. Kidneys: Left-sided nephrolithiasis. Right-sided nephrolithiasis. Left-sided   pelvocaliectasis. Peritoneum/Mesenteries: Within normal limits. Extraperitoneum: Within normal limits. Gastrointestinal tract: Small hiatal hernia. Vascular: Within normal limits. Derick Valente PELVIS:   Extraperitoneum: Within normal limits. Ureters: There is a left-sided ureteral stent which appears reconstituted within   an upper pole calyx and in the bladder at the trigone. There is a calculus in   the proximal left ureter measuring up to 1 cm   Bladder: Within normal limits. Reproductive System: There is a cystic lesion in the pelvis which is slightly   diminished in size. There is an IUD in the uterus. Tubal ligation clips. .   MSK:    Small, fat-containing umbilical hernia. .             CXR Results  (Last 48 hours)      None            Medical Decision Making and ED Course   - I am the first and primary provider for this patient AND AM THE PRIMARY PROVIDER OF RECORD. - I reviewed the vital signs, available nursing notes, past medical history, past surgical history, family history and social history. - Initial assessment performed.  The patients presenting problems have been discussed, and the staff are in agreement with the care plan formulated and outlined with them. I have encouraged them to ask questions as they arise throughout their visit. Vital Signs-Reviewed the patient's vital signs. Patient Vitals for the past 12 hrs:   Temp Pulse Resp BP SpO2   10/18/22 1855 -- 80 18 (!) 148/93 97 %   10/18/22 1648 98.1 °F (36.7 °C) 98 18 (!) 187/98 99 %         Records Reviewed: Nursing Notes    ED Course:       ED Course as of 10/18/22 2111   Tue Oct 18, 2022   236 42-year-old female presents for evaluation of left flank pain. Patient recently had a stent placed due to a large kidney stone and has lithotripsy planned for this week. She is currently on antibiotics for UTI,methenamine. She now is having low-grade fevers and chills as well as worsening pain and hematuria. Concern for possible pyelonephritis versus renal stone versus stent failure. Getting labs including a CBC, BMP, UA, UPT as well as blood cultures. CT abdomen pelvis ordered. [LW]   2108 Dr. Gerald Retana recommended treatment for pyelonephritis. Bactrim cannot indicated because of her current antibiotic. Patient is allergic to Cipro. Ceftriaxone ordered. Dr. Magdalene Dill to admit. [LW]      ED Course User Index  [LW] Devin Koch MD           Consultations:       Consultations: -  Hospitalist Consultant: Dr. Magdalene Dill: We have asked for emergent assistance with regard to this patient. We have discussed the patients HPI, ROS, PE and results this far. They will come and evaluate the patient for admission. and - Urology Consultant: Dr. Gerald Retana: We have asked for emergent assistance with regard to this patient. We have discussed the patients HPI, ROS, PE and results this far. They will come and evaluate the patient for their acute surgical needs and further treatment with possible admission. Disposition     Disposition: Admitted to Floor Medical Floor the case was discussed with the admitting physician Shayy. Admitted    Diagnosis     Clinical Impression:   1.  Kidney stone on left side 2. Pyelonephritis        Attestations:    Sonali Alvarado MD    Please note that this dictation was completed with Rain, the computer voice recognition software. Quite often unanticipated grammatical, syntax, homophones, and other interpretive errors are inadvertently transcribed by the computer software. Please disregard these errors. Please excuse any errors that have escaped final proofreading. Thank you.

## 2022-10-18 NOTE — ED TRIAGE NOTES
Patient has kidney stone, had kidney stent placed on L side on Thursday 10/13. Pain increasingly getting worse and having more blood in urine.  Patient endorses nausea no vomiting, denies any cp/sob

## 2022-10-19 LAB
ALBUMIN SERPL-MCNC: 3 G/DL (ref 3.5–5)
ALBUMIN/GLOB SERPL: 0.9 {RATIO} (ref 1.1–2.2)
ALP SERPL-CCNC: 93 U/L (ref 45–117)
ALT SERPL-CCNC: 11 U/L (ref 12–78)
ANION GAP SERPL CALC-SCNC: 5 MMOL/L (ref 5–15)
AST SERPL W P-5'-P-CCNC: 13 U/L (ref 15–37)
BACTERIA SPEC CULT: NORMAL
BASOPHILS # BLD: 0 K/UL (ref 0–0.1)
BASOPHILS NFR BLD: 0 % (ref 0–1)
BILIRUB SERPL-MCNC: 0.2 MG/DL (ref 0.2–1)
BUN SERPL-MCNC: 17 MG/DL (ref 6–20)
BUN/CREAT SERPL: 19 (ref 12–20)
CA-I BLD-MCNC: 8.7 MG/DL (ref 8.5–10.1)
CHLORIDE SERPL-SCNC: 110 MMOL/L (ref 97–108)
CO2 SERPL-SCNC: 24 MMOL/L (ref 21–32)
CREAT SERPL-MCNC: 0.89 MG/DL (ref 0.55–1.02)
DIFFERENTIAL METHOD BLD: ABNORMAL
EOSINOPHIL # BLD: 0.5 K/UL (ref 0–0.4)
EOSINOPHIL NFR BLD: 5 % (ref 0–7)
ERYTHROCYTE [DISTWIDTH] IN BLOOD BY AUTOMATED COUNT: 13.5 % (ref 11.5–14.5)
GLOBULIN SER CALC-MCNC: 3.3 G/DL (ref 2–4)
GLUCOSE SERPL-MCNC: 96 MG/DL (ref 65–100)
HCT VFR BLD AUTO: 38.3 % (ref 35–47)
HGB BLD-MCNC: 12.3 G/DL (ref 11.5–16)
IMM GRANULOCYTES # BLD AUTO: 0 K/UL (ref 0–0.04)
IMM GRANULOCYTES NFR BLD AUTO: 0 % (ref 0–0.5)
LYMPHOCYTES # BLD: 2.2 K/UL (ref 0.8–3.5)
LYMPHOCYTES NFR BLD: 24 % (ref 12–49)
MCH RBC QN AUTO: 28.9 PG (ref 26–34)
MCHC RBC AUTO-ENTMCNC: 32.1 G/DL (ref 30–36.5)
MCV RBC AUTO: 89.9 FL (ref 80–99)
MONOCYTES # BLD: 0.5 K/UL (ref 0–1)
MONOCYTES NFR BLD: 6 % (ref 5–13)
NEUTS SEG # BLD: 6 K/UL (ref 1.8–8)
NEUTS SEG NFR BLD: 65 % (ref 32–75)
NRBC # BLD: 0 K/UL (ref 0–0.01)
NRBC BLD-RTO: 0 PER 100 WBC
PLATELET # BLD AUTO: 306 K/UL (ref 150–400)
PMV BLD AUTO: 10.8 FL (ref 8.9–12.9)
POTASSIUM SERPL-SCNC: 4.1 MMOL/L (ref 3.5–5.1)
PROT SERPL-MCNC: 6.3 G/DL (ref 6.4–8.2)
RBC # BLD AUTO: 4.26 M/UL (ref 3.8–5.2)
SODIUM SERPL-SCNC: 139 MMOL/L (ref 136–145)
SPECIAL REQUESTS,SREQ: NORMAL
WBC # BLD AUTO: 9.3 K/UL (ref 3.6–11)

## 2022-10-19 PROCEDURE — 74011250636 HC RX REV CODE- 250/636: Performed by: FAMILY MEDICINE

## 2022-10-19 PROCEDURE — 36415 COLL VENOUS BLD VENIPUNCTURE: CPT

## 2022-10-19 PROCEDURE — 85025 COMPLETE CBC W/AUTO DIFF WBC: CPT

## 2022-10-19 PROCEDURE — 51798 US URINE CAPACITY MEASURE: CPT

## 2022-10-19 PROCEDURE — 65270000029 HC RM PRIVATE

## 2022-10-19 PROCEDURE — 99221 1ST HOSP IP/OBS SF/LOW 40: CPT | Performed by: UROLOGY

## 2022-10-19 PROCEDURE — 74011250637 HC RX REV CODE- 250/637: Performed by: FAMILY MEDICINE

## 2022-10-19 PROCEDURE — 80053 COMPREHEN METABOLIC PANEL: CPT

## 2022-10-19 PROCEDURE — 74011000250 HC RX REV CODE- 250: Performed by: FAMILY MEDICINE

## 2022-10-19 RX ORDER — DIPHENHYDRAMINE HYDROCHLORIDE 50 MG/ML
12.5 INJECTION, SOLUTION INTRAMUSCULAR; INTRAVENOUS
Status: DISCONTINUED | OUTPATIENT
Start: 2022-10-19 | End: 2022-10-20 | Stop reason: HOSPADM

## 2022-10-19 RX ADMIN — METHENAMINE HIPPURATE 1 G: 1 TABLET ORAL at 20:37

## 2022-10-19 RX ADMIN — DIPHENHYDRAMINE HYDROCHLORIDE 12.5 MG: 50 INJECTION, SOLUTION INTRAMUSCULAR; INTRAVENOUS at 11:59

## 2022-10-19 RX ADMIN — SODIUM CHLORIDE 75 ML/HR: 9 INJECTION, SOLUTION INTRAVENOUS at 13:27

## 2022-10-19 RX ADMIN — OXYCODONE HYDROCHLORIDE 10 MG: 10 TABLET ORAL at 13:18

## 2022-10-19 RX ADMIN — DIPHENHYDRAMINE HYDROCHLORIDE 12.5 MG: 50 INJECTION, SOLUTION INTRAMUSCULAR; INTRAVENOUS at 06:25

## 2022-10-19 RX ADMIN — OXYCODONE HYDROCHLORIDE 10 MG: 10 TABLET ORAL at 03:23

## 2022-10-19 RX ADMIN — OXYCODONE HYDROCHLORIDE 10 MG: 10 TABLET ORAL at 08:35

## 2022-10-19 RX ADMIN — DIPHENHYDRAMINE HYDROCHLORIDE 12.5 MG: 50 INJECTION, SOLUTION INTRAMUSCULAR; INTRAVENOUS at 20:41

## 2022-10-19 RX ADMIN — OXYCODONE HYDROCHLORIDE 10 MG: 10 TABLET ORAL at 19:01

## 2022-10-19 RX ADMIN — METHENAMINE HIPPURATE 1 G: 1 TABLET ORAL at 08:35

## 2022-10-19 RX ADMIN — CEFTRIAXONE SODIUM 1 G: 1 INJECTION, POWDER, FOR SOLUTION INTRAMUSCULAR; INTRAVENOUS at 20:38

## 2022-10-19 NOTE — PROGRESS NOTES
1310: Called and spoke with Dr. Ynes Rosales regarding the plan for the patient and if she can have a diet order. Per Dr. Ynes Rosales, she can have a regular diet and the plan of care is for the patient to receive IV antibiotics until the infection is clear and then will plan for the lithotripsy once the infection is gone. Orders for diet were placed. Notified patient of what Dr. Ynes Rosales stated above.

## 2022-10-19 NOTE — CONSULTS
UROLOGY CONSULT NOTE  [phone]        Patient: Santos Schmidt MRN: [id number]  SSN: [id number]    YOB: 1985  Age: 40 y.o. Sex: female      REFERRING PROVIDER: javier  Subjective:      Santos Schmidt is a 40 y.o. female who is being seen in urologic consultation for pyelonephritis with a kidney stone. Patient's known to be kidney stone reportedly had a UTI treated with Macrodantin prior to come the ER. Patient developed pyelonephritis. Was admitted. She has a 12 mm mid ureteral stone with a stent.  She was admitted for iv antibiotics, no reason for surgical intervention till after uti is treated    Past Medical History:   Diagnosis Date    Kidney calculi      Past Surgical History:   Procedure Laterality Date    HX  SECTION      HX LITHOTRIPSY      \"multiple times but 2017 was most recent\"    HX LITHOTRIPSY      HX OTHER SURGICAL  2020    intrauterine device (IUD)    HX TUBAL LIGATION      HX UROLOGICAL  10/12/2021    CYSTOSCOPY, URETEROSCOPY WITH LASER LITHOTRIPSY,     HX UROLOGICAL  10/12/2021    BILATERAL RETROGRADE PYELOGRAM;     HX UROLOGICAL  10/12/2021    LEFT URETERAL STENT PLACEMENT     HX UROLOGICAL  10/27/2021    cystoscopy stent remove    HX UROLOGICAL  2022    cystoscopy    HX UROLOGICAL  2022    Right retrograde,     HX UROLOGICAL Right 2022    right diagnostic ureteroscopy    HX UROLOGICAL Left 10/13/2022    Cystoscopy with double-J stent placement on the left      Family History   Problem Relation Age of Onset    Uterine Cancer Maternal Grandmother     Diabetes Paternal Grandmother     Liver Cancer Paternal Grandfather      Social History     Tobacco Use    Smoking status: Every Day     Packs/day: 0.50     Years: 10.00     Pack years: 5.00     Types: Cigarettes    Smokeless tobacco: Current   Substance Use Topics    Alcohol use: No     Comment: rarely      Current Facility-Administered Medications   Medication Dose Route Frequency Provider Last Rate Last Admin    diphenhydrAMINE (BENADRYL) injection 12.5 mg  12.5 mg IntraVENous Q4H PRN Froy Madrid MD   12.5 mg at 10/19/22 1159    methenamine hippurate (HIPREX) tablet 1 g  1 g Oral BID Shashank Torres MD   1 g at 10/19/22 0835    0.9% sodium chloride infusion  75 mL/hr IntraVENous CONTINUOUS Froy Madrid MD 75 mL/hr at 10/19/22 1327 75 mL/hr at 10/19/22 1327    acetaminophen (TYLENOL) tablet 650 mg  650 mg Oral Q6H PRN Froy Madrid MD        Or    acetaminophen (TYLENOL) suppository 650 mg  650 mg Rectal Q6H PRN Froy Madrid MD        polyethylene glycol (MIRALAX) packet 17 g  17 g Oral DAILY PRN Shashank Torres MD        ondansetron (ZOFRAN ODT) tablet 4 mg  4 mg Oral Q8H PRN Froy Madrid MD        Or    ondansetron (ZOFRAN) injection 4 mg  4 mg IntraVENous Q6H PRN Froy Madrid MD        enoxaparin (LOVENOX) injection 40 mg  40 mg SubCUTAneous DAILY Imani Madrid MD        cefTRIAXone (ROCEPHIN) 1 g in sterile water (preservative free) 10 mL IV syringe  1 g IntraVENous Q24H Shashank Torres MD        oxyCODONE IR (ROXICODONE) tablet 10 mg  10 mg Oral Q4H PRN Imani Madrid MD   10 mg at 10/19/22 1318        Allergies   Allergen Reactions    Ciprofloxacin Other (comments)     Thrush       Review of Systems:  ROS  Review of Systems   Constitutional:  Negative for activity change and fever. HENT:  Negative for rhinorrhea and sore throat. Eyes:  Negative for visual disturbance. Respiratory:  Negative for cough. Cardiovascular:  Negative for chest pain. Gastrointestinal:  Negative for abdominal pain, diarrhea, nausea and vomiting. Genitourinary:  Positive for flank pain and hematuria. Negative for dysuria. Musculoskeletal:  Negative for arthralgias and myalgias. Skin:  Negative for rash and wound. Neurological:  Negative for syncope and headaches. Psychiatric/Behavioral:  Negative for confusion.     All other systems reviewed and are negative. Objective:     Vitals:    10/19/22 0812 10/19/22 1200 10/19/22 1600 10/19/22 1606   BP: 139/62   128/65   Pulse: 61 74 63 (!) 59   Resp: 18   18   Temp: 98.1 °F (36.7 °C)   97.7 °F (36.5 °C)   SpO2: 97%      Weight:       Height:            Recent Labs     10/19/22  0616 10/18/22  1700   WBC 9.3 12.5*   HGB 12.3 14.0   HCT 38.3 42.8    400     Recent Labs     10/19/22  0616 10/18/22  1700    138   K 4.1 3.9   * 107   CO2 24 24   GLU 96 130*   BUN 17 14   CREA 0.89 0.96   CA 8.7 9.2   ALB 3.0*  --    TBILI 0.2  --    ALT 11*  --      No results for input(s): PH, PCO2, PO2, HCO3, FIO2 in the last 72 hours. 24 Hour Results:  Recent Results (from the past 24 hour(s))   METABOLIC PANEL, COMPREHENSIVE    Collection Time: 10/19/22  6:16 AM   Result Value Ref Range    Sodium 139 136 - 145 mmol/L    Potassium 4.1 3.5 - 5.1 mmol/L    Chloride 110 (H) 97 - 108 mmol/L    CO2 24 21 - 32 mmol/L    Anion gap 5 5 - 15 mmol/L    Glucose 96 65 - 100 mg/dL    BUN 17 6 - 20 mg/dL    Creatinine 0.89 0.55 - 1.02 mg/dL    BUN/Creatinine ratio 19 12 - 20      eGFR >60 >60 ml/min/1.73m2    Calcium 8.7 8.5 - 10.1 mg/dL    Bilirubin, total 0.2 0.2 - 1.0 mg/dL    AST (SGOT) 13 (L) 15 - 37 U/L    ALT (SGPT) 11 (L) 12 - 78 U/L    Alk.  phosphatase 93 45 - 117 U/L    Protein, total 6.3 (L) 6.4 - 8.2 g/dL    Albumin 3.0 (L) 3.5 - 5.0 g/dL    Globulin 3.3 2.0 - 4.0 g/dL    A-G Ratio 0.9 (L) 1.1 - 2.2     CBC WITH AUTOMATED DIFF    Collection Time: 10/19/22  6:16 AM   Result Value Ref Range    WBC 9.3 3.6 - 11.0 K/uL    RBC 4.26 3.80 - 5.20 M/uL    HGB 12.3 11.5 - 16.0 g/dL    HCT 38.3 35.0 - 47.0 %    MCV 89.9 80.0 - 99.0 FL    MCH 28.9 26.0 - 34.0 PG    MCHC 32.1 30.0 - 36.5 g/dL    RDW 13.5 11.5 - 14.5 %    PLATELET 156 018 - 027 K/uL    MPV 10.8 8.9 - 12.9 FL    NRBC 0.0 0.0  WBC    ABSOLUTE NRBC 0.00 0.00 - 0.01 K/uL    NEUTROPHILS 65 32 - 75 %    LYMPHOCYTES 24 12 - 49 %    MONOCYTES 6 5 - 13 % EOSINOPHILS 5 0 - 7 %    BASOPHILS 0 0 - 1 %    IMMATURE GRANULOCYTES 0 0 - 0.5 %    ABS. NEUTROPHILS 6.0 1.8 - 8.0 K/UL    ABS. LYMPHOCYTES 2.2 0.8 - 3.5 K/UL    ABS. MONOCYTES 0.5 0.0 - 1.0 K/UL    ABS. EOSINOPHILS 0.5 (H) 0.0 - 0.4 K/UL    ABS. BASOPHILS 0.0 0.0 - 0.1 K/UL    ABS. IMM. GRANS. 0.0 0.00 - 0.04 K/UL    DF AUTOMATED         Physical Exam:  Physical Exam  Physical Exam  Vitals and nursing note reviewed. Constitutional:       General: She is in acute distress. Appearance: Normal appearance. She is normal weight. She is not toxic-appearing. HENT:      Head: Normocephalic and atraumatic. Nose: Nose normal.      Mouth/Throat:      Mouth: Mucous membranes are moist.   Eyes:      Conjunctiva/sclera: Conjunctivae normal.   Cardiovascular:      Rate and Rhythm: Normal rate. Pulses: Normal pulses. Pulmonary:      Effort: Pulmonary effort is normal. No respiratory distress. Abdominal:      Palpations: Abdomen is soft. Tenderness: There is no abdominal tenderness. There is left CVA tenderness. There is no right CVA tenderness, guarding or rebound. Musculoskeletal:         General: No swelling or deformity. Normal range of motion. Skin:     General: Skin is warm and dry. Findings: No rash. Neurological:      General: No focal deficit present. Mental Status: She is alert and oriented to person, place, and time. Sensory: No sensory deficit. Motor: No weakness.    Psychiatric:         Mood and Affect: Mood normal.         Behavior: Behavior normal.     Assessment:pyelonephritis with midureteral stone already stented     Hospital Problems  Date Reviewed: 6/25/2022            Codes Class Noted POA    Kidney stone ICD-10-CM: N20.0  ICD-9-CM: 592.0  10/13/2022 Unknown        Pyelonephritis ICD-10-CM: N12  ICD-9-CM: 590.80  10/20/2021 Unknown    Overview Addendum 6/7/2022  8:29 PM by Adolph Henson NP     Ct scan from 5/2/2022  Stone content bilateral kidneys; multiple stones present through each upper  collecting system ranging up to 7-8 mm. No hydronephrosis. 5/3/22-Right Flank pain radiating to her right groin area and her lower  Abdomen. She still has gross hematuria. She has right flank pain with palpation. She denies any symptoms of fever,chills, N/V. She was given bactrim for 10 days. Plan:antibiotics for now , eswl on stone next thursday     Hospital Problems  Date Reviewed: 6/25/2022            Codes Class Noted POA    Kidney stone ICD-10-CM: N20.0  ICD-9-CM: 592.0  10/13/2022 Unknown        Pyelonephritis ICD-10-CM: N12  ICD-9-CM: 590.80  10/20/2021 Unknown    Overview Addendum 6/7/2022  8:29 PM by Berta Mandel NP     Ct scan from 5/2/2022  Stone content bilateral kidneys; multiple stones present through each upper  collecting system ranging up to 7-8 mm. No hydronephrosis. 5/3/22-Right Flank pain radiating to her right groin area and her lower  Abdomen. She still has gross hematuria. She has right flank pain with palpation. She denies any symptoms of fever,chills, N/V. She was given bactrim for 10 days.                 Discussed with Dr. Tigist Chappell, Attending Physician    Signed By: Cristofer Damian MD     October 19, 2022

## 2022-10-19 NOTE — PROGRESS NOTES
Reason for Admission:  flank pain                     RUR Score:          9%           Plan for utilizing home health:      declined    PCP: First and Last name:  Umer Garcia MD     Name of Practice:    Are you a current patient: Yes/No: Yes   Approximate date of last visit: 10/12/2022   Can you participate in a virtual visit with your PCP:                     Current Advanced Directive/Advance Care Plan: Full Code  CM confirmed code status with patient. Healthcare Decision Maker:   Click here to complete [address] including selection of the Healthcare Decision Maker Relationship (ie \"Primary\")             Primary Decision Maker: Elijah Mohr - [phone]                  Transition of Care Plan:                      CM met with patient at bedside to complete DCP assessment. Patient lives with her children and father in a single story home with 3 steps to enter and egress. She ambulates independently and completes all ADLs without assistance. She drives herself everywhere. Patient's home pharmacy is [facility] in [place]. Home health services were declined at this time. Current discharge dispo: home self care, patient will drive herself home. CM will continue to follow.

## 2022-10-19 NOTE — PROGRESS NOTES
Problem: Falls - Risk of  Goal: *Absence of Falls  Description: Document Amanda Ground Fall Risk and appropriate interventions in the flowsheet.   Outcome: Progressing Towards Goal  Note: Fall Risk Interventions:                                Problem: Patient Education: Go to Patient Education Activity  Goal: Patient/Family Education  Outcome: Progressing Towards Goal

## 2022-10-19 NOTE — H&P
History and Physical    NAME: Larissa Oates   :  1985   MRN:  [id number]     Date/Time:  10/19/2022 9:15 AM    Patient PCP: Umer Garcia MD  ______________________________________________________________________             Subjective:         CHIEF COMPLAINT:     Flank pain    HISTORY OF PRESENT ILLNESS:       Patient is a 40y.o. year old female history of kidney stones patient was recently discharged from the hospital kidney stone seen by urology patient had cystoscopy done found to have  1. Obstructing mid left ureteral calculus (7 x 12 mm). Moderate to severe  hydronephrosis.   2. Multiple, additional, nonobstructing, bilateral renal calculi    Seen by the urology patient was stent placed and discharged home on Hip-Randolph  And recommend follow-up for lithotripsy as an outpatient but patient pain getting more worst patient tried to call the urologist office but unable to get the appointment  Came to the ER seen by the ER physician ER physician talked to the urology Dr. Kacey Hayden and he recommended patient to be admitted again    Past Medical History:   Diagnosis Date    Kidney calculi         Past Surgical History:   Procedure Laterality Date    HX  SECTION      HX LITHOTRIPSY  2017    \"multiple times but 2017 was most recent\"    HX LITHOTRIPSY      HX OTHER SURGICAL  2020    intrauterine device (IUD)    HX TUBAL LIGATION      HX UROLOGICAL  10/12/2021    CYSTOSCOPY, URETEROSCOPY WITH LASER LITHOTRIPSY,     HX UROLOGICAL  10/12/2021    BILATERAL RETROGRADE PYELOGRAM;     HX UROLOGICAL  10/12/2021    LEFT URETERAL STENT PLACEMENT     HX UROLOGICAL  10/27/2021    cystoscopy stent remove    HX UROLOGICAL  2022    cystoscopy    HX UROLOGICAL  2022    Right retrograde,     HX UROLOGICAL Right 2022    right diagnostic ureteroscopy    HX UROLOGICAL Left 10/13/2022    Cystoscopy with double-J stent placement on the left       Social History     Tobacco Use    Smoking status: Every Day     Packs/day: 0.50     Years: 10.00     Pack years: 5.00     Types: Cigarettes    Smokeless tobacco: Current   Substance Use Topics    Alcohol use: No     Comment: rarely        Family History   Problem Relation Age of Onset    Uterine Cancer Maternal Grandmother     Diabetes Paternal Grandmother     Liver Cancer Paternal Grandfather        Allergies   Allergen Reactions    Ciprofloxacin Other (comments)     Saman Wang        Prior to Admission medications    Medication Sig Start Date End Date Taking? Authorizing Provider   methenamine hippurate (HIPREX) 1 gram tablet Take 1 Tablet by mouth two (2) times a day for 18 doses.  10/14/22 10/23/22 Yes Marisol Birch MD         Current Facility-Administered Medications:     diphenhydrAMINE (BENADRYL) injection 12.5 mg, 12.5 mg, IntraVENous, Q4H PRN, Imani Madrid MD, 12.5 mg at 10/19/22 2786    methenamine hippurate (HIPREX) tablet 1 g, 1 g, Oral, BID, Imani Madrid MD, 1 g at 10/19/22 0835    0.9% sodium chloride infusion, 75 mL/hr, IntraVENous, CONTINUOUS, Imani Madrid MD, Last Rate: 75 mL/hr at 10/18/22 2330, 75 mL/hr at 10/18/22 2330    acetaminophen (TYLENOL) tablet 650 mg, 650 mg, Oral, Q6H PRN **OR** acetaminophen (TYLENOL) suppository 650 mg, 650 mg, Rectal, Q6H PRN, Imani Madrid MD    polyethylene glycol (MIRALAX) packet 17 g, 17 g, Oral, DAILY PRN, Imani Madrid MD    ondansetron (ZOFRAN ODT) tablet 4 mg, 4 mg, Oral, Q8H PRN **OR** ondansetron (ZOFRAN) injection 4 mg, 4 mg, IntraVENous, Q6H PRN, Imani Madrid MD    enoxaparin (LOVENOX) injection 40 mg, 40 mg, SubCUTAneous, DAILY, Imani Madrid MD    cefTRIAXone (ROCEPHIN) 1 g in sterile water (preservative free) 10 mL IV syringe, 1 g, IntraVENous, Q24H, Imani Madrid MD    oxyCODONE IR (ROXICODONE) tablet 10 mg, 10 mg, Oral, Q4H PRN, Imani Madrid MD, 10 mg at 10/19/22 0804    LAB DATA REVIEWED:    Recent Results (from the past 24 hour(s))   CBC WITH AUTOMATED DIFF Collection Time: 10/18/22  5:00 PM   Result Value Ref Range    WBC 12.5 (H) 3.6 - 11.0 K/uL    RBC 4.88 3.80 - 5.20 M/uL    HGB 14.0 11.5 - 16.0 g/dL    HCT 42.8 35.0 - 47.0 %    MCV 87.7 80.0 - 99.0 FL    MCH 28.7 26.0 - 34.0 PG    MCHC 32.7 30.0 - 36.5 g/dL    RDW 13.4 11.5 - 14.5 %    PLATELET 124 946 - 618 K/uL    MPV 10.5 8.9 - 12.9 FL    NRBC 0.0 0.0  WBC    ABSOLUTE NRBC 0.00 0.00 - 0.01 K/uL    NEUTROPHILS 69 32 - 75 %    LYMPHOCYTES 22 12 - 49 %    MONOCYTES 4 (L) 5 - 13 %    EOSINOPHILS 4 0 - 7 %    BASOPHILS 0 0 - 1 %    IMMATURE GRANULOCYTES 1 (H) 0 - 0.5 %    ABS. NEUTROPHILS 8.7 (H) 1.8 - 8.0 K/UL    ABS. LYMPHOCYTES 2.8 0.8 - 3.5 K/UL    ABS. MONOCYTES 0.5 0.0 - 1.0 K/UL    ABS. EOSINOPHILS 0.4 0.0 - 0.4 K/UL    ABS. BASOPHILS 0.1 0.0 - 0.1 K/UL    ABS. IMM.  GRANS. 0.1 (H) 0.00 - 0.04 K/UL    DF AUTOMATED     METABOLIC PANEL, BASIC    Collection Time: 10/18/22  5:00 PM   Result Value Ref Range    Sodium 138 136 - 145 mmol/L    Potassium 3.9 3.5 - 5.1 mmol/L    Chloride 107 97 - 108 mmol/L    CO2 24 21 - 32 mmol/L    Anion gap 7 5 - 15 mmol/L    Glucose 130 (H) 65 - 100 mg/dL    BUN 14 6 - 20 mg/dL    Creatinine 0.96 0.55 - 1.02 mg/dL    BUN/Creatinine ratio 15 12 - 20      eGFR >60 >60 ml/min/1.73m2    Calcium 9.2 8.5 - 10.1 mg/dL   URINALYSIS W/ RFLX MICROSCOPIC    Collection Time: 10/18/22  5:00 PM   Result Value Ref Range    Color Yellow/Straw      Appearance Turbid (A) Clear      Specific gravity 1.017 1.003 - 1.030      pH (UA) 6.0 5.0 - 8.0      Protein 100 (A) Negative mg/dL    Glucose Negative Negative mg/dL    Ketone Negative Negative mg/dL    Bilirubin Negative Negative      Blood Large (A) Negative      Urobilinogen 0.1 0.1 - 1.0 EU/dL    Nitrites Negative Negative      Leukocyte Esterase Large (A) Negative      WBC >100 (H) 0 - 4 /hpf    RBC >100 (H) 0 - 5 /hpf    Bacteria Negative Negative /hpf   HCG URINE, QL    Collection Time: 10/18/22  5:00 PM   Result Value Ref Range    HCG urine, QL Negative Negative     URINE MICROSCOPIC    Collection Time: 10/18/22  5:00 PM   Result Value Ref Range    WBC >100 (H) 0 - 4 /hpf    RBC >100 (H) 0 - 5 /hpf    Bacteria Negative Negative /hpf   CULTURE, BLOOD, PAIRED    Collection Time: 10/18/22  5:52 PM    Specimen: Blood   Result Value Ref Range    Special Requests: No Special Requests      Culture result: No growth after 8 hours     METABOLIC PANEL, COMPREHENSIVE    Collection Time: 10/19/22  6:16 AM   Result Value Ref Range    Sodium 139 136 - 145 mmol/L    Potassium 4.1 3.5 - 5.1 mmol/L    Chloride 110 (H) 97 - 108 mmol/L    CO2 24 21 - 32 mmol/L    Anion gap 5 5 - 15 mmol/L    Glucose 96 65 - 100 mg/dL    BUN 17 6 - 20 mg/dL    Creatinine 0.89 0.55 - 1.02 mg/dL    BUN/Creatinine ratio 19 12 - 20      eGFR >60 >60 ml/min/1.73m2    Calcium 8.7 8.5 - 10.1 mg/dL    Bilirubin, total 0.2 0.2 - 1.0 mg/dL    AST (SGOT) 13 (L) 15 - 37 U/L    ALT (SGPT) 11 (L) 12 - 78 U/L    Alk. phosphatase 93 45 - 117 U/L    Protein, total 6.3 (L) 6.4 - 8.2 g/dL    Albumin 3.0 (L) 3.5 - 5.0 g/dL    Globulin 3.3 2.0 - 4.0 g/dL    A-G Ratio 0.9 (L) 1.1 - 2.2     CBC WITH AUTOMATED DIFF    Collection Time: 10/19/22  6:16 AM   Result Value Ref Range    WBC 9.3 3.6 - 11.0 K/uL    RBC 4.26 3.80 - 5.20 M/uL    HGB 12.3 11.5 - 16.0 g/dL    HCT 38.3 35.0 - 47.0 %    MCV 89.9 80.0 - 99.0 FL    MCH 28.9 26.0 - 34.0 PG    MCHC 32.1 30.0 - 36.5 g/dL    RDW 13.5 11.5 - 14.5 %    PLATELET 708 579 - 360 K/uL    MPV 10.8 8.9 - 12.9 FL    NRBC 0.0 0.0  WBC    ABSOLUTE NRBC 0.00 0.00 - 0.01 K/uL    NEUTROPHILS 65 32 - 75 %    LYMPHOCYTES 24 12 - 49 %    MONOCYTES 6 5 - 13 %    EOSINOPHILS 5 0 - 7 %    BASOPHILS 0 0 - 1 %    IMMATURE GRANULOCYTES 0 0 - 0.5 %    ABS. NEUTROPHILS 6.0 1.8 - 8.0 K/UL    ABS. LYMPHOCYTES 2.2 0.8 - 3.5 K/UL    ABS. MONOCYTES 0.5 0.0 - 1.0 K/UL    ABS. EOSINOPHILS 0.5 (H) 0.0 - 0.4 K/UL    ABS. BASOPHILS 0.0 0.0 - 0.1 K/UL    ABS. IMM.  GRANS. 0.0 0.00 - 0.04 K/UL    DF AUTOMATED         XR Results (most recent):  Results from Hospital Encounter encounter on 10/13/22    XR FLUOROSCOPY UNDER 60 MINUTES    Narrative  Compliance only. Impression  Fluoroscopic guidance was provided for the referring clinician. Fluoroscopy time is 21.7 s.         CT ABD PELV WO CONT   Final Result   1. There is a left-sided, double-J ureteral stent which appears reconstituted in   the upper pole calyx and in the bladder trigone. 2. There is a calculus in the proximal left ureter. 3. Bilateral nephrolithiasis. 4. Incidental findings as above. Review of Systems:  Constitutional: Negative for chills and fever. HENT: Negative. Eyes: Negative. Respiratory: Negative. Cardiovascular: Negative. Gastrointestinal: Negative for abdominal pain and nausea. Skin: Negative. Neurological: Negative. Objective:   VITALS:    Visit Vitals  /62 (BP 1 Location: Left upper arm, BP Patient Position: At rest)   Pulse 61   Temp 98.1 °F (36.7 °C)   Resp 18   Ht 5' 5\" (1.651 m)   Wt 102.1 kg (225 lb)   SpO2 97%   BMI 37.44 kg/m²       Physical Exam:   Constitutional: pt is oriented to person, place, and time. HENT:   Head: Normocephalic and atraumatic. Eyes: Pupils are equal, round, and reactive to light. EOM are normal.   Cardiovascular: Normal rate, regular rhythm and normal heart sounds. Pulmonary/Chest: Breath sounds normal. No wheezes. No rales. Exhibits no tenderness. Abdominal: Soft. Bowel sounds are normal. There is no abdominal tenderness. There is no rebound and no guarding. Musculoskeletal: Normal range of motion. Neurological: pt is alert and oriented to person, place, and time. Alert. Normal strength. No cranial nerve deficit or sensory deficit. Displays a negative Romberg sign. ASSESSMENT & PLAN:    1. There is a left-sided, double-J ureteral stent which appears reconstituted in  the upper pole calyx and in the bladder trigone.   2. There is a calculus in the proximal left ureter.   3. Bilateral nephrolithiasis      Start on IV fluids after blood cultures urine culture start on IV Rocephin  Urology already consulted from the ER  Continue pain medications      ________________________________________________________________________    Signed: Puja Lanier MD

## 2022-10-20 VITALS
DIASTOLIC BLOOD PRESSURE: 89 MMHG | RESPIRATION RATE: 18 BRPM | OXYGEN SATURATION: 98 % | BODY MASS INDEX: 37.49 KG/M2 | TEMPERATURE: 99 F | HEIGHT: 65 IN | WEIGHT: 225 LBS | SYSTOLIC BLOOD PRESSURE: 147 MMHG | HEART RATE: 76 BPM

## 2022-10-20 PROCEDURE — 74011250637 HC RX REV CODE- 250/637: Performed by: FAMILY MEDICINE

## 2022-10-20 PROCEDURE — 74011250636 HC RX REV CODE- 250/636: Performed by: FAMILY MEDICINE

## 2022-10-20 RX ORDER — OXYCODONE HYDROCHLORIDE 10 MG/1
10 TABLET ORAL
Qty: 10 TABLET | Refills: 0 | Status: SHIPPED | OUTPATIENT
Start: 2022-10-20 | End: 2022-10-23

## 2022-10-20 RX ORDER — DOXYCYCLINE 100 MG/1
100 CAPSULE ORAL 2 TIMES DAILY
Qty: 20 CAPSULE | Refills: 0 | Status: SHIPPED | OUTPATIENT
Start: 2022-10-20

## 2022-10-20 RX ADMIN — SODIUM CHLORIDE 75 ML/HR: 9 INJECTION, SOLUTION INTRAVENOUS at 03:58

## 2022-10-20 RX ADMIN — OXYCODONE HYDROCHLORIDE 10 MG: 10 TABLET ORAL at 03:58

## 2022-10-20 RX ADMIN — OXYCODONE HYDROCHLORIDE 10 MG: 10 TABLET ORAL at 08:13

## 2022-10-20 RX ADMIN — DIPHENHYDRAMINE HYDROCHLORIDE 12.5 MG: 50 INJECTION, SOLUTION INTRAMUSCULAR; INTRAVENOUS at 08:13

## 2022-10-20 RX ADMIN — METHENAMINE HIPPURATE 1 G: 1 TABLET ORAL at 08:12

## 2022-10-20 RX ADMIN — DIPHENHYDRAMINE HYDROCHLORIDE 12.5 MG: 50 INJECTION, SOLUTION INTRAMUSCULAR; INTRAVENOUS at 03:58

## 2022-10-20 NOTE — DISCHARGE INSTRUCTIONS
Discharge Instructions       PATIENT ID: Dyan Mejia  MRN: [id number]   YOB: 1985    DATE OF ADMISSION: [unfilled]    DATE OF DISCHARGE: 10/20/2022    PRIMARY CARE PROVIDER: @PCP@     ATTENDING PHYSICIAN: [unfilled]  DISCHARGING PROVIDER: Andrew Adams MD    To contact this individual call [phone] and ask the  to page. If unavailable ask to be transferred the Adult Hospitalist Department. DISCHARGE DIAGNOSES kidney stone UTI    CONSULTATIONS: [unfilled]    PROCEDURES/SURGERIES: * No surgery found *    PENDING TEST RESULTS:   At the time of discharge the following test results are still pending: Urine cultures    [facility]   [facility]     ADDITIONAL CARE RECOMMENDATIONS: Follow-up with urology    DIET: Regular diet      ACTIVITY: Activity as tolerated    Wound care: Wound Care Order: submitted to [facility] Please view [email or web address]    EQUIPMENT needed: ***      DISCHARGE MEDICATIONS:   See Medication Reconciliation Form    It is important that you take the medication exactly as they are prescribed. Keep your medication in the bottles provided by the pharmacist and keep a list of the medication names, dosages, and times to be taken in your wallet. Do not take other medications without consulting your doctor. NOTIFY YOUR PHYSICIAN FOR ANY OF THE FOLLOWING:   Fever over 101 degrees for 24 hours. Chest pain, shortness of breath, fever, chills, nausea, vomiting, diarrhea, change in mentation, falling, weakness, bleeding. Severe pain or pain not relieved by medications. Or, any other signs or symptoms that you may have questions about.       DISPOSITION:    Home With:   OT  PT  HH  RN       SNF/Inpatient Rehab/LTAC    Independent/assisted living    Hospice    Other:         PROBLEM LIST Updated:  Yes ***       Signed:   Andrew Adams MD  10/20/2022  8:39 AM

## 2022-10-20 NOTE — PROGRESS NOTES
Received telephone verification from MD Madrid that the patient can take a shower prior to discharge.

## 2022-10-20 NOTE — PROGRESS NOTES
CM noted discharge order. Patient is clear to discharge home self care by CM. Nurse is aware. Discharge plan of care/case management plan validated with provider discharge order.

## 2022-10-20 NOTE — DISCHARGE SUMMARY
Discharge Summary       PATIENT ID: Adelina Abarca  MRN: [id number]   YOB: 1985    DATE OF ADMISSION: 10/18/2022  4:50 PM    DATE OF DISCHARGE:   PRIMARY CARE PROVIDER: Lysbeth Angelucci, MD     ATTENDING PHYSICIAN: Nicol Hagan  DISCHARGING PROVIDER: Brian Madrid      CONSULTATIONS: IP CONSULT TO UROLOGY    PROCEDURES/SURGERIES: * No surgery found *    ADMITTING DIAGNOSES:    Patient Active Problem List    Diagnosis Date Noted    Kidney stone 10/13/2022    UTI (urinary tract infection) 10/13/2022    Kidney stones 10/13/2022    Pain in the abdomen 05/12/2022    Gross hematuria 05/08/2022    Right flank pain 05/03/2022    Kidney infection 05/03/2022    Cystitis 10/20/2021    Pyelonephritis 10/20/2021    Hydronephrosis 10/19/2021    Renal stone 10/10/2021    Calculus, ureter 09/02/2017       DISCHARGE DIAGNOSES / PLAN:      1. There is a left-sided, double-J ureteral stent which appears reconstituted in  the upper pole calyx and in the bladder trigone. 2. There is a calculus in the proximal left ureter. 3. Bilateral nephrolithiasis        DISCHARGE MEDICATIONS:  Current Discharge Medication List        START taking these medications    Details   oxyCODONE IR (ROXICODONE) 10 mg tab immediate release tablet Take 1 Tablet by mouth every four (4) hours as needed for Pain for up to 3 days. Max Daily Amount: 60 mg.  Qty: 10 Tablet, Refills: 0  Start date: 10/20/2022, End date: 10/23/2022    Associated Diagnoses: Kidney stone on left side      doxycycline (MONODOX) 100 mg capsule Take 1 Capsule by mouth two (2) times a day. Qty: 20 Capsule, Refills: 0  Start date: 10/20/2022           CONTINUE these medications which have NOT CHANGED    Details   methenamine hippurate (HIPREX) 1 gram tablet Take 1 Tablet by mouth two (2) times a day for 18 doses.   Qty: 18 Tablet, Refills: 0           STOP taking these medications       HYDROcodone-acetaminophen (Norco) 5-325 mg per tablet Comments:   Reason for Stopping:                 NOTIFY YOUR PHYSICIAN FOR ANY OF THE FOLLOWING:   Fever over 101 degrees for 24 hours. Chest pain, shortness of breath, fever, chills, nausea, vomiting, diarrhea, change in mentation, falling, weakness, bleeding. Severe pain or pain not relieved by medications. Or, any other signs or symptoms that you may have questions about. DISPOSITION:  x  Home With:   OT  PT  HH  RN       Long term SNF/Inpatient Rehab    Independent/assisted living    Hospice    Other:       PATIENT CONDITION AT DISCHARGE: Stable      PHYSICAL EXAMINATION AT DISCHARGE:  General:          Alert, cooperative, no distress, appears stated age. HEENT:           Atraumatic, anicteric sclerae, pink conjunctivae                          No oral ulcers, mucosa moist, throat clear, dentition fair  Neck:               Supple, symmetrical  Lungs:             Clear to auscultation bilaterally. No Wheezing or Rhonchi. No rales. Chest wall:      No tenderness  No Accessory muscle use. Heart:              Regular  rhythm,  No  murmur   No edema  Abdomen:        Soft, non-tender. Not distended. Bowel sounds normal  Extremities:     No cyanosis. No clubbing,                            Skin turgor normal, Capillary refill normal  Skin:                Not pale. Not Jaundiced  No rashes   Psych:             Not anxious or agitated. Neurologic:      Alert, moves all extremities, answers questions appropriately and responds to commands     CT ABD PELV WO CONT   Final Result   1. There is a left-sided, double-J ureteral stent which appears reconstituted in   the upper pole calyx and in the bladder trigone. 2. There is a calculus in the proximal left ureter. 3. Bilateral nephrolithiasis. 4. Incidental findings as above. No results found for this or any previous visit (from the past 24 hour(s)).        HOSPITAL COURSE:    Patient is a 40y.o. year old female history of kidney stones patient was recently discharged from the hospital kidney stone seen by urology patient had cystoscopy done found to have  1. Obstructing mid left ureteral calculus (7 x 12 mm). Moderate to severe  hydronephrosis.   2. Multiple, additional, nonobstructing, bilateral renal calculi     Seen by the urology patient was stent placed and discharged home on Hip-Randolph  And recommend follow-up for lithotripsy as an outpatient but patient pain getting more worst patient tried to call the urologist office but unable to get the appointment  Came to the ER seen by the ER physician ER physician talked to the urology Dr. West Castaneda and he recommended patient to be admitted again    Patient is on IV fluids IV antibiotic patient seen by the urology recommended patient can be discharged and follow-up with him in 1 week in his office    Today patient denies any chest pain shortness of breath nausea vomiting diarrhea no constipation no fever no chills able to eat and drink well discharged on oral antibiotics and pain medication and follow-up with urology next Thursday      Signed:   Dodie Castillo MD  10/20/2022  8:39 AM

## 2022-10-20 NOTE — PROGRESS NOTES
Problem: Falls - Risk of  Goal: *Absence of Falls  Description: Document Dorothy Embs Fall Risk and appropriate interventions in the flowsheet.   Outcome: Progressing Towards Goal  Note: Fall Risk Interventions:            Medication Interventions: Bed/chair exit alarm

## 2022-10-20 NOTE — PROGRESS NOTES
Patient discharged from hospital today due to infection. Discharged on antibiotics and pain medication. Surgery to still take place 10/27/22 for left lithotripsy. Patient currently with Left side ureteral stent that was placed 10/13/22.

## 2022-10-21 DIAGNOSIS — N20.1 LEFT URETERAL STONE: Primary | ICD-10-CM

## 2022-10-24 LAB
BACTERIA SPEC CULT: NORMAL
SPECIAL REQUESTS,SREQ: NORMAL

## 2022-10-27 ENCOUNTER — APPOINTMENT (OUTPATIENT)
Dept: GENERAL RADIOLOGY | Age: 37
End: 2022-10-27
Attending: UROLOGY
Payer: MEDICAID

## 2022-10-27 ENCOUNTER — ANESTHESIA (OUTPATIENT)
Dept: SURGERY | Age: 37
End: 2022-10-27
Payer: MEDICAID

## 2022-10-27 ENCOUNTER — ANESTHESIA EVENT (OUTPATIENT)
Dept: SURGERY | Age: 37
End: 2022-10-27
Payer: MEDICAID

## 2022-10-27 ENCOUNTER — HOSPITAL ENCOUNTER (OUTPATIENT)
Age: 37
Discharge: HOME | End: 2022-10-27
Attending: UROLOGY | Admitting: UROLOGY
Payer: MEDICAID

## 2022-10-27 VITALS
DIASTOLIC BLOOD PRESSURE: 71 MMHG | WEIGHT: 225 LBS | BODY MASS INDEX: 37.49 KG/M2 | RESPIRATION RATE: 18 BRPM | HEART RATE: 71 BPM | HEIGHT: 65 IN | SYSTOLIC BLOOD PRESSURE: 144 MMHG | OXYGEN SATURATION: 100 % | TEMPERATURE: 98 F

## 2022-10-27 PROBLEM — N20.1 CALCULUS OF URETER: Status: ACTIVE | Noted: 2022-10-27

## 2022-10-27 LAB
APTT PPP: 29.3 SEC (ref 21.2–34.1)
HCG UR QL: NEGATIVE
INR PPP: 1.1 (ref 0.9–1.1)
PROTHROMBIN TIME: 14.5 SEC (ref 11.9–14.6)
THERAPEUTIC RANGE,PTTT: NORMAL SEC (ref 82–109)

## 2022-10-27 PROCEDURE — 50590 FRAGMENTING OF KIDNEY STONE: CPT | Performed by: UROLOGY

## 2022-10-27 PROCEDURE — 81025 URINE PREGNANCY TEST: CPT

## 2022-10-27 PROCEDURE — 36415 COLL VENOUS BLD VENIPUNCTURE: CPT

## 2022-10-27 PROCEDURE — 76210000021 HC REC RM PH II 0.5 TO 1 HR: Performed by: UROLOGY

## 2022-10-27 PROCEDURE — 76010000160 HC OR TIME 0.5 TO 1 HR INTENSV-TIER 1: Performed by: UROLOGY

## 2022-10-27 PROCEDURE — 2709999900 HC NON-CHARGEABLE SUPPLY: Performed by: UROLOGY

## 2022-10-27 PROCEDURE — 77030040361 HC SLV COMPR DVT MDII -B: Performed by: UROLOGY

## 2022-10-27 PROCEDURE — 74018 RADEX ABDOMEN 1 VIEW: CPT

## 2022-10-27 PROCEDURE — 74011250636 HC RX REV CODE- 250/636: Performed by: ANESTHESIOLOGY

## 2022-10-27 PROCEDURE — 74011250636 HC RX REV CODE- 250/636: Performed by: NURSE ANESTHETIST, CERTIFIED REGISTERED

## 2022-10-27 PROCEDURE — 74011250636 HC RX REV CODE- 250/636: Performed by: UROLOGY

## 2022-10-27 PROCEDURE — 76210000063 HC OR PH I REC FIRST 0.5 HR: Performed by: UROLOGY

## 2022-10-27 PROCEDURE — 85730 THROMBOPLASTIN TIME PARTIAL: CPT

## 2022-10-27 PROCEDURE — 76060000032 HC ANESTHESIA 0.5 TO 1 HR: Performed by: UROLOGY

## 2022-10-27 PROCEDURE — 74011000250 HC RX REV CODE- 250: Performed by: NURSE ANESTHETIST, CERTIFIED REGISTERED

## 2022-10-27 PROCEDURE — 85610 PROTHROMBIN TIME: CPT

## 2022-10-27 RX ORDER — MIDAZOLAM HYDROCHLORIDE 1 MG/ML
INJECTION, SOLUTION INTRAMUSCULAR; INTRAVENOUS AS NEEDED
Status: DISCONTINUED | OUTPATIENT
Start: 2022-10-27 | End: 2022-10-27 | Stop reason: HOSPADM

## 2022-10-27 RX ORDER — GENTAMICIN SULFATE 100 MG/50ML
INJECTION, SOLUTION INTRAVENOUS AS NEEDED
Status: DISCONTINUED | OUTPATIENT
Start: 2022-10-27 | End: 2022-10-27 | Stop reason: HOSPADM

## 2022-10-27 RX ORDER — ONDANSETRON 2 MG/ML
4 INJECTION INTRAMUSCULAR; INTRAVENOUS AS NEEDED
Status: DISCONTINUED | OUTPATIENT
Start: 2022-10-27 | End: 2022-10-27 | Stop reason: HOSPADM

## 2022-10-27 RX ORDER — SODIUM CHLORIDE 0.9 % (FLUSH) 0.9 %
5-40 SYRINGE (ML) INJECTION AS NEEDED
Status: DISCONTINUED | OUTPATIENT
Start: 2022-10-27 | End: 2022-10-27 | Stop reason: HOSPADM

## 2022-10-27 RX ORDER — SODIUM CHLORIDE 0.9 % (FLUSH) 0.9 %
5-40 SYRINGE (ML) INJECTION EVERY 8 HOURS
Status: DISCONTINUED | OUTPATIENT
Start: 2022-10-27 | End: 2022-10-27 | Stop reason: HOSPADM

## 2022-10-27 RX ORDER — HYDROMORPHONE HYDROCHLORIDE 1 MG/ML
0.5 INJECTION, SOLUTION INTRAMUSCULAR; INTRAVENOUS; SUBCUTANEOUS
Status: DISCONTINUED | OUTPATIENT
Start: 2022-10-27 | End: 2022-10-27 | Stop reason: HOSPADM

## 2022-10-27 RX ORDER — SODIUM CHLORIDE, SODIUM LACTATE, POTASSIUM CHLORIDE, CALCIUM CHLORIDE 600; 310; 30; 20 MG/100ML; MG/100ML; MG/100ML; MG/100ML
INJECTION, SOLUTION INTRAVENOUS
Status: DISCONTINUED | OUTPATIENT
Start: 2022-10-27 | End: 2022-10-27 | Stop reason: HOSPADM

## 2022-10-27 RX ORDER — LIDOCAINE HYDROCHLORIDE 20 MG/ML
INJECTION, SOLUTION EPIDURAL; INFILTRATION; INTRACAUDAL; PERINEURAL AS NEEDED
Status: DISCONTINUED | OUTPATIENT
Start: 2022-10-27 | End: 2022-10-27 | Stop reason: HOSPADM

## 2022-10-27 RX ORDER — KETOROLAC TROMETHAMINE 10 MG/1
10 TABLET, FILM COATED ORAL
Qty: 20 TABLET | Refills: 1 | Status: SHIPPED | OUTPATIENT
Start: 2022-10-27

## 2022-10-27 RX ORDER — FENTANYL CITRATE 50 UG/ML
INJECTION, SOLUTION INTRAMUSCULAR; INTRAVENOUS AS NEEDED
Status: DISCONTINUED | OUTPATIENT
Start: 2022-10-27 | End: 2022-10-27 | Stop reason: HOSPADM

## 2022-10-27 RX ORDER — SODIUM CHLORIDE, SODIUM LACTATE, POTASSIUM CHLORIDE, CALCIUM CHLORIDE 600; 310; 30; 20 MG/100ML; MG/100ML; MG/100ML; MG/100ML
1000 INJECTION, SOLUTION INTRAVENOUS CONTINUOUS
Status: DISCONTINUED | OUTPATIENT
Start: 2022-10-27 | End: 2022-10-27 | Stop reason: HOSPADM

## 2022-10-27 RX ORDER — PROPOFOL 10 MG/ML
INJECTION, EMULSION INTRAVENOUS AS NEEDED
Status: DISCONTINUED | OUTPATIENT
Start: 2022-10-27 | End: 2022-10-27 | Stop reason: HOSPADM

## 2022-10-27 RX ORDER — FENTANYL CITRATE 50 UG/ML
25 INJECTION, SOLUTION INTRAMUSCULAR; INTRAVENOUS
Status: DISCONTINUED | OUTPATIENT
Start: 2022-10-27 | End: 2022-10-27 | Stop reason: HOSPADM

## 2022-10-27 RX ORDER — KETOROLAC TROMETHAMINE 30 MG/ML
INJECTION, SOLUTION INTRAMUSCULAR; INTRAVENOUS AS NEEDED
Status: DISCONTINUED | OUTPATIENT
Start: 2022-10-27 | End: 2022-10-27 | Stop reason: HOSPADM

## 2022-10-27 RX ORDER — ONDANSETRON 2 MG/ML
INJECTION INTRAMUSCULAR; INTRAVENOUS AS NEEDED
Status: DISCONTINUED | OUTPATIENT
Start: 2022-10-27 | End: 2022-10-27 | Stop reason: HOSPADM

## 2022-10-27 RX ORDER — DEXAMETHASONE SODIUM PHOSPHATE 4 MG/ML
INJECTION, SOLUTION INTRA-ARTICULAR; INTRALESIONAL; INTRAMUSCULAR; INTRAVENOUS; SOFT TISSUE AS NEEDED
Status: DISCONTINUED | OUTPATIENT
Start: 2022-10-27 | End: 2022-10-27 | Stop reason: HOSPADM

## 2022-10-27 RX ADMIN — FENTANYL CITRATE 100 MCG: 50 INJECTION, SOLUTION INTRAMUSCULAR; INTRAVENOUS at 09:23

## 2022-10-27 RX ADMIN — MIDAZOLAM HYDROCHLORIDE 2 MG: 2 INJECTION, SOLUTION INTRAMUSCULAR; INTRAVENOUS at 09:21

## 2022-10-27 RX ADMIN — LIDOCAINE HYDROCHLORIDE 100 MG: 20 INJECTION, SOLUTION EPIDURAL; INFILTRATION; INTRACAUDAL; PERINEURAL at 09:23

## 2022-10-27 RX ADMIN — PROPOFOL 200 MG: 10 INJECTION, EMULSION INTRAVENOUS at 09:23

## 2022-10-27 RX ADMIN — SODIUM CHLORIDE, POTASSIUM CHLORIDE, SODIUM LACTATE AND CALCIUM CHLORIDE 1000 ML: 600; 310; 30; 20 INJECTION, SOLUTION INTRAVENOUS at 08:16

## 2022-10-27 RX ADMIN — PHENYLEPHRINE HYDROCHLORIDE 200 MCG: 10 INJECTION INTRAVENOUS at 09:50

## 2022-10-27 RX ADMIN — SODIUM CHLORIDE, POTASSIUM CHLORIDE, SODIUM LACTATE AND CALCIUM CHLORIDE: 600; 310; 30; 20 INJECTION, SOLUTION INTRAVENOUS at 09:21

## 2022-10-27 RX ADMIN — KETOROLAC TROMETHAMINE 30 MG: 30 INJECTION, SOLUTION INTRAMUSCULAR at 09:57

## 2022-10-27 RX ADMIN — DEXAMETHASONE SODIUM PHOSPHATE 4 MG: 4 INJECTION, SOLUTION INTRA-ARTICULAR; INTRALESIONAL; INTRAMUSCULAR; INTRAVENOUS; SOFT TISSUE at 09:23

## 2022-10-27 RX ADMIN — PHENYLEPHRINE HYDROCHLORIDE 100 MCG: 10 INJECTION INTRAVENOUS at 09:43

## 2022-10-27 RX ADMIN — HYDROMORPHONE HYDROCHLORIDE 0.5 MG: 1 INJECTION, SOLUTION INTRAMUSCULAR; INTRAVENOUS; SUBCUTANEOUS at 10:24

## 2022-10-27 RX ADMIN — ONDANSETRON 4 MG: 2 INJECTION INTRAMUSCULAR; INTRAVENOUS at 09:23

## 2022-10-27 RX ADMIN — PHENYLEPHRINE HYDROCHLORIDE 200 MCG: 10 INJECTION INTRAVENOUS at 09:57

## 2022-10-27 RX ADMIN — FENTANYL CITRATE 25 MCG: 0.05 INJECTION, SOLUTION INTRAMUSCULAR; INTRAVENOUS at 10:23

## 2022-10-27 RX ADMIN — FENTANYL CITRATE 25 MCG: 0.05 INJECTION, SOLUTION INTRAMUSCULAR; INTRAVENOUS at 10:28

## 2022-10-27 RX ADMIN — GENTAMICIN SULFATE 80 MG: 100 INJECTION, SOLUTION INTRAVENOUS at 09:23

## 2022-10-27 NOTE — PERIOP NOTES
Patient alert and oriented x4, VS stable, 2/10 constant throbbing pain on left lower back & flank. Aunt in waiting room. Bed in low position, call bell within reach. Patient states okay to review and give discharge instructions to aunt Mccloud.

## 2022-10-27 NOTE — ROUTINE PROCESS
IV removed-- tip intact, no redness, swelling or bleeding noted. VSS. Patient in no acute distress. DC instructions reviewed with  aunt and patient-- both verbalized understanding. Patient wheeled out to private vehicle-- no distress noted.

## 2022-10-27 NOTE — ANESTHESIA POSTPROCEDURE EVALUATION
Procedure(s):  LEFT URETERAL EXTRACORPOREAL SHOCKWAVE LITHOTRIPSY. general    Anesthesia Post Evaluation      Multimodal analgesia: multimodal analgesia used between 6 hours prior to anesthesia start to PACU discharge  Patient location during evaluation: PACU  Patient participation: complete - patient participated  Level of consciousness: awake  Pain score: 0  Pain management: adequate  Airway patency: patent  Anesthetic complications: no  Cardiovascular status: acceptable  Respiratory status: acceptable  Hydration status: acceptable  Post anesthesia nausea and vomiting:  controlled  Final Post Anesthesia Temperature Assessment:  Normothermia (36.0-37.5 degrees C)      INITIAL Post-op Vital signs:   Vitals Value Taken Time   /75 10/27/22 1030   Temp 36.7 °C (98 °F) 10/27/22 1015   Pulse 72 10/27/22 1030   Resp 11 10/27/22 1030   SpO2 97 % 10/27/22 1030   Vitals shown include unvalidated device data.

## 2022-10-27 NOTE — ANESTHESIA PREPROCEDURE EVALUATION
Relevant Problems   RENAL FAILURE   (+) Hydronephrosis   (+) Kidney infection   (+) Kidney stone   (+) Kidney stones   (+) Renal stone       Anesthetic History   No history of anesthetic complications            Review of Systems / Medical History  Patient summary reviewed and pertinent labs reviewed    Pulmonary          Smoker         Neuro/Psych   Within defined limits           Cardiovascular  Within defined limits                     GI/Hepatic/Renal  Within defined limits              Endo/Other        Morbid obesity     Other Findings            Past Medical History:   Diagnosis Date    Kidney calculi        Past Surgical History:   Procedure Laterality Date    HX  SECTION      HX LITHOTRIPSY      \"multiple times but  was most recent\"    HX LITHOTRIPSY      HX OTHER SURGICAL  2020    intrauterine device (IUD)    HX TUBAL LIGATION      HX UROLOGICAL  10/12/2021    CYSTOSCOPY, URETEROSCOPY WITH LASER LITHOTRIPSY,     HX UROLOGICAL  10/12/2021    BILATERAL RETROGRADE PYELOGRAM;     HX UROLOGICAL  10/12/2021    LEFT URETERAL STENT PLACEMENT     HX UROLOGICAL  10/27/2021    cystoscopy stent remove    HX UROLOGICAL  2022    cystoscopy    HX UROLOGICAL  2022    Right retrograde,     HX UROLOGICAL Right 2022    right diagnostic ureteroscopy    HX UROLOGICAL Left 10/13/2022    Cystoscopy with double-J stent placement on the left       Current Outpatient Medications   Medication Instructions    doxycycline (MONODOX) 100 mg, Oral, 2 TIMES DAILY       No current facility-administered medications for this encounter. No data found.     Lab Results   Component Value Date/Time    WBC 9.3 10/19/2022 06:16 AM    HGB 12.3 10/19/2022 06:16 AM    HCT 38.3 10/19/2022 06:16 AM    PLATELET 917  06:16 AM    MCV 89.9 10/19/2022 06:16 AM     Lab Results   Component Value Date/Time    Sodium 139 10/19/2022 06:16 AM    Potassium 4.1 10/19/2022 06:16 AM    Chloride 110 (H) 10/19/2022 06:16 AM    CO2 24 10/19/2022 06:16 AM    Anion gap 5 10/19/2022 06:16 AM    Glucose 96 10/19/2022 06:16 AM    BUN 17 10/19/2022 06:16 AM    Creatinine 0.89 10/19/2022 06:16 AM    BUN/Creatinine ratio 19 10/19/2022 06:16 AM    GFR est AA >60 05/02/2022 03:48 PM    GFR est non-AA >60 05/02/2022 03:48 PM    Calcium 8.7 10/19/2022 06:16 AM     No results found for: APTT, PTP, INR, INREXT  Lab Results   Component Value Date/Time    Glucose 96 10/19/2022 06:16 AM         Physical Exam    Airway  Mallampati: III    Neck ROM: normal range of motion        Cardiovascular    Rhythm: regular  Rate: normal         Dental         Pulmonary  Breath sounds clear to auscultation               Abdominal         Other Findings            Anesthetic Plan    ASA: 3  Anesthesia type: MAC and total IV anesthesia    Monitoring Plan: Continuous noninvasive hemodynamic monitoring      Induction: Intravenous  Anesthetic plan and risks discussed with: Patient

## 2022-10-27 NOTE — OP NOTES
Name:     Tang Smith    MR #: [id number]    Surgeon:  Jany Gallardo M.D. Surgery Date: 10/27/2022                                              OPERATIVE REPORT      PREOPERATIVE DIAGNOSIS:  nephrolithiasis     POSTOPERATIVE DIAGNOSIS:  same    OPERATIVE PROCEDURE:   Left  Extracorporeal shockwave lithotripsy      SURGEON:   Gato rTeviño MD    ANESTHESIA:   General    EBL: none    SPECIMENS: none    COMPLICATIONS: none    INDICATIONS:  A  40 y.o. with a history of nephrolithiasis. PROCEDURE IN DETAIL:  The patient underwent a general anesthetic. A time out was called and the planned operation verified. The patient was placed supine on the procedure table. Using multiplanar flouroscopy the stone was visualized and targeted. Using the [facility] 2 lithotriptor, shockwaves were delivered from a power level of 1 to a level of 7 for a total of 3000 shocks. There was apparent fragmentation seen. The need for further intervention cannot be excluded. The patient tolerated the procedure well and was transported in stable to the recovery.         Gato Treviño MD

## 2022-11-01 ENCOUNTER — APPOINTMENT (OUTPATIENT)
Dept: CT IMAGING | Age: 37
End: 2022-11-01
Attending: NURSE PRACTITIONER
Payer: MEDICAID

## 2022-11-01 ENCOUNTER — HOSPITAL ENCOUNTER (EMERGENCY)
Age: 37
Discharge: HOME | End: 2022-11-01
Attending: EMERGENCY MEDICINE
Payer: MEDICAID

## 2022-11-01 VITALS
DIASTOLIC BLOOD PRESSURE: 96 MMHG | HEART RATE: 94 BPM | TEMPERATURE: 98.5 F | BODY MASS INDEX: 37.49 KG/M2 | SYSTOLIC BLOOD PRESSURE: 141 MMHG | HEIGHT: 65 IN | RESPIRATION RATE: 20 BRPM | OXYGEN SATURATION: 97 % | WEIGHT: 225 LBS

## 2022-11-01 DIAGNOSIS — N28.9 RENAL INSUFFICIENCY: ICD-10-CM

## 2022-11-01 DIAGNOSIS — Z98.890 STATUS POST LASER LITHOTRIPSY OF URETERAL CALCULUS: ICD-10-CM

## 2022-11-01 DIAGNOSIS — N20.1 CALCULUS, URETER: ICD-10-CM

## 2022-11-01 DIAGNOSIS — R10.9 FLANK PAIN: Primary | ICD-10-CM

## 2022-11-01 DIAGNOSIS — N39.0 COMPLICATED UTI (URINARY TRACT INFECTION): ICD-10-CM

## 2022-11-01 LAB
ANION GAP SERPL CALC-SCNC: 6 MMOL/L (ref 5–15)
APPEARANCE UR: ABNORMAL
BACTERIA URNS QL MICRO: ABNORMAL /HPF
BASOPHILS # BLD: 0.1 K/UL (ref 0–0.1)
BASOPHILS NFR BLD: 1 % (ref 0–1)
BILIRUB UR QL: NEGATIVE
BUN SERPL-MCNC: 25 MG/DL (ref 6–20)
BUN/CREAT SERPL: 19 (ref 12–20)
CA-I BLD-MCNC: 9.6 MG/DL (ref 8.5–10.1)
CAOX CRY URNS QL MICRO: ABNORMAL
CHLORIDE SERPL-SCNC: 111 MMOL/L (ref 97–108)
CO2 SERPL-SCNC: 24 MMOL/L (ref 21–32)
COLOR UR: ABNORMAL
CREAT SERPL-MCNC: 1.3 MG/DL (ref 0.55–1.02)
DIFFERENTIAL METHOD BLD: ABNORMAL
EOSINOPHIL # BLD: 0.6 K/UL (ref 0–0.4)
EOSINOPHIL NFR BLD: 5 % (ref 0–7)
ERYTHROCYTE [DISTWIDTH] IN BLOOD BY AUTOMATED COUNT: 13.4 % (ref 11.5–14.5)
GLUCOSE SERPL-MCNC: 87 MG/DL (ref 65–100)
GLUCOSE UR STRIP.AUTO-MCNC: NEGATIVE MG/DL
HCT VFR BLD AUTO: 42 % (ref 35–47)
HGB BLD-MCNC: 13.5 G/DL (ref 11.5–16)
HGB UR QL STRIP: ABNORMAL
IMM GRANULOCYTES # BLD AUTO: 0 K/UL (ref 0–0.04)
IMM GRANULOCYTES NFR BLD AUTO: 0 % (ref 0–0.5)
KETONES UR QL STRIP.AUTO: 5 MG/DL
LEUKOCYTE ESTERASE UR QL STRIP.AUTO: ABNORMAL
LYMPHOCYTES # BLD: 2.8 K/UL (ref 0.8–3.5)
LYMPHOCYTES NFR BLD: 27 % (ref 12–49)
MCH RBC QN AUTO: 28.5 PG (ref 26–34)
MCHC RBC AUTO-ENTMCNC: 32.1 G/DL (ref 30–36.5)
MCV RBC AUTO: 88.8 FL (ref 80–99)
MONOCYTES # BLD: 0.7 K/UL (ref 0–1)
MONOCYTES NFR BLD: 7 % (ref 5–13)
MUCOUS THREADS URNS QL MICRO: ABNORMAL /LPF
NEUTS SEG # BLD: 6.2 K/UL (ref 1.8–8)
NEUTS SEG NFR BLD: 60 % (ref 32–75)
NITRITE UR QL STRIP.AUTO: NEGATIVE
NRBC # BLD: 0 K/UL (ref 0–0.01)
NRBC BLD-RTO: 0 PER 100 WBC
PH UR STRIP: 5 [PH] (ref 5–8)
PLATELET # BLD AUTO: 366 K/UL (ref 150–400)
PMV BLD AUTO: 10.9 FL (ref 8.9–12.9)
POTASSIUM SERPL-SCNC: 4.4 MMOL/L (ref 3.5–5.1)
PROT UR STRIP-MCNC: 100 MG/DL
RBC # BLD AUTO: 4.73 M/UL (ref 3.8–5.2)
RBC #/AREA URNS HPF: >100 /HPF (ref 0–5)
SODIUM SERPL-SCNC: 141 MMOL/L (ref 136–145)
SP GR UR REFRACTOMETRY: 1.03 (ref 1–1.03)
UROBILINOGEN UR QL STRIP.AUTO: 0.1 EU/DL (ref 0.1–1)
WBC # BLD AUTO: 10.4 K/UL (ref 3.6–11)
WBC URNS QL MICRO: >100 /HPF (ref 0–4)

## 2022-11-01 PROCEDURE — 99284 EMERGENCY DEPT VISIT MOD MDM: CPT

## 2022-11-01 PROCEDURE — 74011250636 HC RX REV CODE- 250/636: Performed by: NURSE PRACTITIONER

## 2022-11-01 PROCEDURE — 74176 CT ABD & PELVIS W/O CONTRAST: CPT

## 2022-11-01 PROCEDURE — 80048 BASIC METABOLIC PNL TOTAL CA: CPT

## 2022-11-01 PROCEDURE — 96375 TX/PRO/DX INJ NEW DRUG ADDON: CPT

## 2022-11-01 PROCEDURE — 85025 COMPLETE CBC W/AUTO DIFF WBC: CPT

## 2022-11-01 PROCEDURE — 81001 URINALYSIS AUTO W/SCOPE: CPT

## 2022-11-01 PROCEDURE — 96374 THER/PROPH/DIAG INJ IV PUSH: CPT

## 2022-11-01 RX ORDER — CEPHALEXIN 500 MG/1
500 CAPSULE ORAL 4 TIMES DAILY
Qty: 28 CAPSULE | Refills: 0 | Status: SHIPPED | OUTPATIENT
Start: 2022-11-01 | End: 2022-11-08

## 2022-11-01 RX ORDER — ONDANSETRON 2 MG/ML
4 INJECTION INTRAMUSCULAR; INTRAVENOUS
Status: COMPLETED | OUTPATIENT
Start: 2022-11-01 | End: 2022-11-01

## 2022-11-01 RX ORDER — KETOROLAC TROMETHAMINE 30 MG/ML
30 INJECTION, SOLUTION INTRAMUSCULAR; INTRAVENOUS
Status: COMPLETED | OUTPATIENT
Start: 2022-11-01 | End: 2022-11-01

## 2022-11-01 RX ORDER — OXYCODONE HYDROCHLORIDE 5 MG/1
5 TABLET ORAL
Qty: 12 TABLET | Refills: 0 | Status: SHIPPED | OUTPATIENT
Start: 2022-11-01 | End: 2022-11-04

## 2022-11-01 RX ADMIN — KETOROLAC TROMETHAMINE 30 MG: 30 INJECTION, SOLUTION INTRAMUSCULAR at 22:17

## 2022-11-01 RX ADMIN — SODIUM CHLORIDE 1000 ML: 9 INJECTION, SOLUTION INTRAVENOUS at 22:19

## 2022-11-01 RX ADMIN — ONDANSETRON 4 MG: 2 INJECTION INTRAMUSCULAR; INTRAVENOUS at 22:17

## 2022-11-01 NOTE — Clinical Note
[facility] EMERGENCY DEPT  [address] [place] [id number]  [phone]    Work/School Note    Date: 11/1/2022    To Whom It May concern:    César Gutierrez was seen and treated today in the emergency room by the following provider(s):  Attending Provider: Ananda Seyd MD  Nurse Practitioner: Jessi Waite NP. César Gutierrez is excused from work/school on 11/1/2022 through 11/3/2022. She is medically clear to return to work/school on 11/4/2022.          Sincerely,          Odalys Ly NP

## 2022-11-01 NOTE — PROGRESS NOTES
HISTORY OF PRESENT ILLNESS    Cristobal Beckwith is a 40 y.o. female. Ct scan from 22  Nephroureteral stent remains in place, stable in appearance. Diminished left-sided hydroureteronephrosis compared to the prior examination. Right and left renal calculi remain. Ureteral calculus on the left is present and unchanged. 10/27/22 ESWL    KUB from 10/27/22  8 mm left mid ureteral stone. Left double-J stent in place. 10/19/22 patient admitted to Ed with pyelonephritis with a kidney stone  She was treated with Rocephin in the hospital  She was sent home with doxycycline      -patient is s/p cystoscopy with left ureteral stent placement   The patient had obstructing mild left ureteral calculus 7x12 mm and moderate hydronephrosis. Past Medical History:  PMHx (including negatives):  has a past medical history of Kidney calculi. PSurgHx:  has a past surgical history that includes hx  section (); hx tubal ligation (); hx lithotripsy (); hx lithotripsy; hx urological (10/12/2021); hx urological (10/12/2021); hx urological (10/12/2021); hx urological (10/27/2021); hx urological (2022); hx other surgical (); hx urological (2022); hx urological (Right, 2022); and hx urological (Left, 10/13/2022). PSocHx:  reports that she has been smoking cigarettes. She has a 5.00 pack-year smoking history. She has never used smokeless tobacco. She reports that she does not currently use alcohol. She reports that she does not use drugs. Home Medications    Medication Sig Start Date End Date Taking? Authorizing Provider   ketorolac (TORADOL) 10 mg tablet Take 1 Tablet by mouth every six (6) hours as needed for Pain. 10/27/22   Roge Lucio MD   doxycycline (MONODOX) 100 mg capsule Take 1 Capsule by mouth two (2) times a day. 10/20/22   Mihai Madrid MD        Chronic Conditions Addressed Today       1.  Kidney stones - Primary       Review of Systems Constitutional:  Positive for chills. HENT: Negative. Respiratory: Negative. Genitourinary:  Positive for flank pain, frequency, hematuria and urgency. Skin: Negative. Neurological: Negative. Endo/Heme/Allergies: Negative. Psychiatric/Behavioral: Negative. Patient denies the symptoms of COVID-19 per routine screening guidelines. Physical Exam    ASSESSMENT and PLAN  Diagnoses and all orders for this visit:    1. Kidney stones                 Eloisa Gross may have a reminder for a \"due or due soon\" health maintenance. The patient has been encouraged to contact their primary care provider for follow-up on this health maintenance or other necessary and/or routine health screening.      Rossi Kauffman NP

## 2022-11-01 NOTE — ED TRIAGE NOTES
Recent stent placement in left kidney last Friday with shockwave. Suppose to have stent pulled on Thursday. Has had increased blood in urine. Severe pain on left side.

## 2022-11-02 ENCOUNTER — TELEPHONE (OUTPATIENT)
Dept: UROLOGY | Age: 37
End: 2022-11-02

## 2022-11-02 NOTE — DISCHARGE INSTRUCTIONS
Thank you! Thank you for allowing me to care for you in the emergency department. It is my goal to provide you with excellent care. If you have not received excellent quality care, please ask to speak to the nurse manager. Please fill out the survey that will come to you by mail or email since we listen to your feedback! Below you will find a list of your tests from today's visit. Should you have any questions, please do not hesitate to call the emergency department. Labs  Recent Results (from the past 12 hour(s))   URINALYSIS W/MICROSCOPIC    Collection Time: 11/01/22  8:12 PM   Result Value Ref Range    Color SAL     Appearance Turbid (A) Clear      Specific gravity 1.027 1.003 - 1.030      pH (UA) 5.0 5.0 - 8.0      Protein 100 (A) Negative mg/dL    Glucose Negative Negative mg/dL    Ketone 5 (A) Negative mg/dL    Bilirubin Negative Negative      Blood Large (A) Negative      Urobilinogen 0.1 0.1 - 1.0 EU/dL    Nitrites Negative Negative      Leukocyte Esterase Large (A) Negative      WBC >100 (H) 0 - 4 /hpf    RBC >100 (H) 0 - 5 /hpf    Bacteria 1+ (A) Negative /hpf    Mucus 3+ /lpf    CA Oxalate crystals 3+    CBC WITH AUTOMATED DIFF    Collection Time: 11/01/22  9:26 PM   Result Value Ref Range    WBC 10.4 3.6 - 11.0 K/uL    RBC 4.73 3.80 - 5.20 M/uL    HGB 13.5 11.5 - 16.0 g/dL    HCT 42.0 35.0 - 47.0 %    MCV 88.8 80.0 - 99.0 FL    MCH 28.5 26.0 - 34.0 PG    MCHC 32.1 30.0 - 36.5 g/dL    RDW 13.4 11.5 - 14.5 %    PLATELET 983 758 - 028 K/uL    MPV 10.9 8.9 - 12.9 FL    NRBC 0.0 0.0  WBC    ABSOLUTE NRBC 0.00 0.00 - 0.01 K/uL    NEUTROPHILS 60 32 - 75 %    LYMPHOCYTES 27 12 - 49 %    MONOCYTES 7 5 - 13 %    EOSINOPHILS 5 0 - 7 %    BASOPHILS 1 0 - 1 %    IMMATURE GRANULOCYTES 0 0 - 0.5 %    ABS. NEUTROPHILS 6.2 1.8 - 8.0 K/UL    ABS. LYMPHOCYTES 2.8 0.8 - 3.5 K/UL    ABS. MONOCYTES 0.7 0.0 - 1.0 K/UL    ABS. EOSINOPHILS 0.6 (H) 0.0 - 0.4 K/UL    ABS. BASOPHILS 0.1 0.0 - 0.1 K/UL    ABS. IMM. Paddy Headley. 0.0 0.00 - 0.04 K/UL    DF AUTOMATED     METABOLIC PANEL, BASIC    Collection Time: 11/01/22  9:26 PM   Result Value Ref Range    Sodium 141 136 - 145 mmol/L    Potassium 4.4 3.5 - 5.1 mmol/L    Chloride 111 (H) 97 - 108 mmol/L    CO2 24 21 - 32 mmol/L    Anion gap 6 5 - 15 mmol/L    Glucose 87 65 - 100 mg/dL    BUN 25 (H) 6 - 20 mg/dL    Creatinine 1.30 (H) 0.55 - 1.02 mg/dL    BUN/Creatinine ratio 19 12 - 20      eGFR 54 (L) >60 ml/min/1.73m2    Calcium 9.6 8.5 - 10.1 mg/dL       Radiologic Studies  CT ABD PELV WO CONT   Final Result   Nephroureteral stent remains in place, stable in appearance. Diminished left-sided hydroureteronephrosis compared to the prior examination. Right and left renal calculi remain. Ureteral calculus on the left is present and unchanged. Incidental and/or nonemergent findings are as described in detail above. CT Results  (Last 48 hours)                 11/01/22 2234  CT ABD PELV WO CONT Final result    Impression:  Nephroureteral stent remains in place, stable in appearance. Diminished left-sided hydroureteronephrosis compared to the prior examination. Right and left renal calculi remain. Ureteral calculus on the left is present and unchanged. Incidental and/or nonemergent findings are as described in detail above. Narrative:  CLINICAL HISTORY: s/p lithotripsy and stent placement    INDICATION: s/p lithotripsy and stent placement   COMPARISON: 10/18/2020   CONTRAST:  None. TECHNIQUE:    Thin axial images were obtained through the abdomen and pelvis. Coronal and   sagittal reformats were generated. Oral contrast was not administered. CT dose   reduction was achieved through use of a standardized protocol tailored for this   examination and automatic exposure control for dose modulation.         The absence of intravenous contrast material reduces the sensitivity for   evaluation of visceral organs and vasculature including presence of small mass   lesions, hemodynamically significant stenoses, dissections, mucosal   abnormalities etc.       FINDINGS:    LOWER THORAX: No significant abnormality in the incidentally imaged lower chest.   LIVER/GALLBLADDER: Hepatic steatosis. Gallbladder is within normal limits. CBD   is not dilated. SPLEEN/PANCREAS:  within normal limits. ADRENALS/KIDNEYS: Stable appearance of nephroureteral stent placement on the   left. Proximal pigtail lies within the upper pole calyx. Distal pigtail in the   urinary bladder. There is diminished hydroureteronephrosis on the left. There   are persistent ureteral calculi on the left. There are punctate nonobstructive   renal calculi on the right. Minimal residual renal calculi in the left upper   pole. STOMACH: Small hiatal hernia. Bonne Major SMALL BOWEL/COLON: No dilatation or wall thickening. No dilatation or wall   thickening. APPENDIX: Unremarkable. PERITONEUM: No ascites or pneumoperitoneum. RETROPERITONEUM: No lymphadenopathy or aortic aneurysm. REPRODUCTIVE ORGANS: IUD in place. Indication. URINARY BLADDER: No mass or calculus. BONES: No destructive bone lesion. ADDITIONAL COMMENTS: N/A                 CXR Results  (Last 48 hours)      None          ------------------------------------------------------------------------------------------------------------  The exam and treatment you received in the Emergency Department were for an urgent problem and are not intended as complete care. It is important that you follow-up with a doctor, nurse practitioner, or physician assistant to:  (1) confirm your diagnosis,  (2) re-evaluation of changes in your illness and treatment, and  (3) for ongoing care. Please take your discharge instructions with you when you go to your follow-up appointment. If you have any problem arranging a follow-up appointment, contact the Emergency Department.   If your symptoms become worse or you do not improve as expected and you are unable to reach your health care provider, please return to the Emergency Department. We are available 24 hours a day. If a prescription has been provided, please have it filled as soon as possible to prevent a delay in treatment. If you have any questions or reservations about taking the medication due to side effects or interactions with other medications, please call your primary care provider or contact the ER.

## 2022-11-02 NOTE — TELEPHONE ENCOUNTER
Patient called stating she went to ER for not being able to urinate, was advised to call our office to ask if we needed to see her today per [facility], if pt in pain can come in today or can wait to her original appt tomorrow- line got disconnected, tried to call pt and went to ARNIE NEVES for patient to call back

## 2022-11-03 ENCOUNTER — OFFICE VISIT (OUTPATIENT)
Dept: UROLOGY | Age: 37
End: 2022-11-03
Payer: MEDICAID

## 2022-11-03 VITALS
DIASTOLIC BLOOD PRESSURE: 95 MMHG | WEIGHT: 225 LBS | HEART RATE: 93 BPM | HEIGHT: 65 IN | BODY MASS INDEX: 37.49 KG/M2 | SYSTOLIC BLOOD PRESSURE: 141 MMHG

## 2022-11-03 DIAGNOSIS — N20.0 KIDNEY STONES: Primary | ICD-10-CM

## 2022-11-03 DIAGNOSIS — Z96.0 RETAINED URETERAL STENT: ICD-10-CM

## 2022-11-03 LAB
BILIRUB UR QL: NEGATIVE
GLUCOSE UR-MCNC: NEGATIVE MG/DL
KETONES P FAST UR STRIP-MCNC: NEGATIVE MG/DL
PH UR STRIP: 6 [PH] (ref 4.6–8)
PROT UR QL STRIP: 100
SP GR UR STRIP: 1.02 (ref 1–1.03)
UA UROBILINOGEN AMB POC: NORMAL (ref 0.2–1)
URINALYSIS CLARITY POC: NORMAL
URINALYSIS COLOR POC: NORMAL
URINE BLOOD POC: NORMAL
URINE LEUKOCYTES POC: NORMAL
URINE NITRITES POC: NEGATIVE

## 2022-11-03 PROCEDURE — 52310 CYSTOSCOPY AND TREATMENT: CPT | Performed by: UROLOGY

## 2022-11-03 PROCEDURE — 81003 URINALYSIS AUTO W/O SCOPE: CPT | Performed by: UROLOGY

## 2022-11-03 RX ORDER — GENTAMICIN SULFATE 40 MG/ML
80 INJECTION, SOLUTION INTRAMUSCULAR; INTRAVENOUS ONCE
Status: SHIPPED | OUTPATIENT
Start: 2022-11-03 | End: 2022-11-03

## 2022-11-03 NOTE — PROGRESS NOTES
Chief Complaint   Patient presents with    Surgical Follow-up    Cystoscopy    Kidney Stone       1. Have you been to the ER, urgent care clinic since your last visit? Hospitalized since your last visit? Yes wasn't able to urinate    2. Have you seen or consulted any other health care providers outside of the [address] [place] [address] since your last visit? Include any pap smears or colon screening.  No      Visit Vitals  BP (!) 141/95 (BP 1 Location: Right upper arm, BP Patient Position: Sitting, BP Cuff Size: Adult long)   Pulse 93   Ht 5' 5\" (1.651 m)   Wt 225 lb (102.1 kg)   BMI 37.44 kg/m²

## 2022-11-08 NOTE — ED PROVIDER NOTES
EMERGENCY DEPARTMENT HISTORY AND PHYSICAL EXAM      Date: 2022  Patient Name: Edwin Hernandez    History of Presenting Illness     Chief Complaint   Patient presents with    Flank Pain       History Provided By: Patient    HPI: Edwin Hernandez, 40 y.o. female with a past medical history significant for kidney stones presents to the emergency department with cc of flank pain. Patient states she is s/p ureteral stent placement and lithotripsy. She reports worsening left flank pain x1 day. She reports chills and intermittent hematuria. She denies any fevers, abdominal pain or N/V. She describes her pain is much as severe, no aggravating or alleviating factors. There are no other complaints, changes, or physical findings at this time. PCP: Viky Álvarez MD    No current facility-administered medications on file prior to encounter. Current Outpatient Medications on File Prior to Encounter   Medication Sig Dispense Refill    ketorolac (TORADOL) 10 mg tablet Take 1 Tablet by mouth every six (6) hours as needed for Pain. 20 Tablet 1    doxycycline (MONODOX) 100 mg capsule Take 1 Capsule by mouth two (2) times a day.  20 Capsule 0       Past History     Past Medical History:  Past Medical History:   Diagnosis Date    Kidney calculi        Past Surgical History:  Past Surgical History:   Procedure Laterality Date    HX  SECTION      HX LITHOTRIPSY  2017    \"multiple times but 2017 was most recent\"    HX LITHOTRIPSY      HX OTHER SURGICAL  2020    intrauterine device (IUD)    HX TUBAL LIGATION      HX UROLOGICAL  10/12/2021    CYSTOSCOPY, URETEROSCOPY WITH LASER LITHOTRIPSY,     HX UROLOGICAL  10/12/2021    BILATERAL RETROGRADE PYELOGRAM;     HX UROLOGICAL  10/12/2021    LEFT URETERAL STENT PLACEMENT     HX UROLOGICAL  10/27/2021    cystoscopy stent remove    HX UROLOGICAL  2022    cystoscopy    HX UROLOGICAL  2022    Right retrograde,     HX UROLOGICAL Right 2022    right diagnostic ureteroscopy    HX UROLOGICAL Left 10/13/2022    Cystoscopy with double-J stent placement on the left       Family History:  Family History   Problem Relation Age of Onset    Uterine Cancer Maternal Grandmother     Diabetes Paternal Grandmother     Liver Cancer Paternal Grandfather        Social History:  Social History     Tobacco Use    Smoking status: Every Day     Packs/day: 0.50     Years: 10.00     Pack years: 5.00     Types: Cigarettes    Smokeless tobacco: Never   Vaping Use    Vaping Use: Never used   Substance Use Topics    Alcohol use: Not Currently     Comment: rarely    Drug use: Never       Allergies: Allergies   Allergen Reactions    Ciprofloxacin Other (comments)     Thrush       Review of Systems   Review of Systems   Constitutional:  Positive for chills. Negative for fatigue and fever. Respiratory: Negative. Negative for cough and shortness of breath. Cardiovascular: Negative. Negative for chest pain and palpitations. Gastrointestinal: Negative. Negative for abdominal pain, diarrhea, nausea and vomiting. Genitourinary:  Positive for flank pain and hematuria. Negative for dysuria. Neurological: Negative. Negative for dizziness and headaches. All other systems reviewed and are negative. Physical Exam   Physical Exam  Vitals and nursing note reviewed. Constitutional:       General: She is not in acute distress. Appearance: Normal appearance. HENT:      Head: Normocephalic and atraumatic. Eyes:      Extraocular Movements: Extraocular movements intact. Conjunctiva/sclera: Conjunctivae normal.   Cardiovascular:      Rate and Rhythm: Normal rate and regular rhythm. Heart sounds: Normal heart sounds. Pulmonary:      Effort: Pulmonary effort is normal.      Breath sounds: Normal breath sounds. No wheezing or rales. Abdominal:      General: Bowel sounds are normal.      Palpations: Abdomen is soft. Tenderness: There is no abdominal tenderness. There is left CVA tenderness. There is no right CVA tenderness, guarding or rebound. Musculoskeletal:         General: Normal range of motion. Cervical back: Normal range of motion and neck supple. Skin:     General: Skin is warm and dry. Neurological:      General: No focal deficit present. Mental Status: She is alert. Psychiatric:         Mood and Affect: Mood normal.         Behavior: Behavior normal. Behavior is cooperative. Lab and Diagnostic Study Results   Labs -     Admission on 11/01/2022, Discharged on 11/01/2022   Component Date Value    Color 11/01/2022 SAL     Appearance 11/01/2022 Turbid (A)     Specific gravity 11/01/2022 1.027     pH (UA) 11/01/2022 5.0     Protein 11/01/2022 100 (A)     Glucose 11/01/2022 Negative     Ketone 11/01/2022 5 (A)     Bilirubin 11/01/2022 Negative     Blood 11/01/2022 Large (A)     Urobilinogen 11/01/2022 0.1     Nitrites 11/01/2022 Negative     Leukocyte Esterase 11/01/2022 Large (A)     WBC 11/01/2022 >100 (A)     RBC 11/01/2022 >100 (A)     Bacteria 11/01/2022 1+ (A)     Mucus 11/01/2022 3+     CA Oxalate crystals 11/01/2022 3+     WBC 11/01/2022 10.4     RBC 11/01/2022 4.73     HGB 11/01/2022 13.5     HCT 11/01/2022 42.0     MCV 11/01/2022 88.8     MCH 11/01/2022 28.5     MCHC 11/01/2022 32.1     RDW 11/01/2022 13.4     PLATELET 01/80/7635 705     MPV 11/01/2022 10.9     NRBC 11/01/2022 0.0     ABSOLUTE NRBC 11/01/2022 0.00     NEUTROPHILS 11/01/2022 60     LYMPHOCYTES 11/01/2022 27     MONOCYTES 11/01/2022 7     EOSINOPHILS 11/01/2022 5     BASOPHILS 11/01/2022 1     IMMATURE GRANULOCYTES 11/01/2022 0     ABS. NEUTROPHILS 11/01/2022 6.2     ABS. LYMPHOCYTES 11/01/2022 2.8     ABS. MONOCYTES 11/01/2022 0.7     ABS. EOSINOPHILS 11/01/2022 0.6 (A)     ABS. BASOPHILS 11/01/2022 0.1     ABS. IMM. GRANS.  11/01/2022 0.0     DF 11/01/2022 AUTOMATED     Sodium 11/01/2022 141     Potassium 11/01/2022 4.4     Chloride 11/01/2022 111 (A)     CO2 11/01/2022 24     Anion gap 11/01/2022 6     Glucose 11/01/2022 87     BUN 11/01/2022 25 (A)     Creatinine 11/01/2022 1.30 (A)     BUN/Creatinine ratio 11/01/2022 19     eGFR 11/01/2022 54 (A)     Calcium 11/01/2022 9.6          Radiologic Studies -   CT Results (most recent):  Results from [place] encounter on 11/01/22    CT ABD PELV WO CONT    Narrative  CLINICAL HISTORY: s/p lithotripsy and stent placement  INDICATION: s/p lithotripsy and stent placement  COMPARISON: 10/18/2020  CONTRAST:  None. TECHNIQUE:  Thin axial images were obtained through the abdomen and pelvis. Coronal and  sagittal reformats were generated. Oral contrast was not administered. CT dose  reduction was achieved through use of a standardized protocol tailored for this  examination and automatic exposure control for dose modulation. The absence of intravenous contrast material reduces the sensitivity for  evaluation of visceral organs and vasculature including presence of small mass  lesions, hemodynamically significant stenoses, dissections, mucosal  abnormalities etc.    FINDINGS:  LOWER THORAX: No significant abnormality in the incidentally imaged lower chest.  LIVER/GALLBLADDER: Hepatic steatosis. Gallbladder is within normal limits. CBD  is not dilated. SPLEEN/PANCREAS:  within normal limits. ADRENALS/KIDNEYS: Stable appearance of nephroureteral stent placement on the  left. Proximal pigtail lies within the upper pole calyx. Distal pigtail in the  urinary bladder. There is diminished hydroureteronephrosis on the left. There  are persistent ureteral calculi on the left. There are punctate nonobstructive  renal calculi on the right. Minimal residual renal calculi in the left upper  pole. STOMACH: Small hiatal hernia. Fanny Burleson SMALL BOWEL/COLON: No dilatation or wall thickening. No dilatation or wall  thickening. APPENDIX: Unremarkable. PERITONEUM: No ascites or pneumoperitoneum.   RETROPERITONEUM: No lymphadenopathy or aortic aneurysm. REPRODUCTIVE ORGANS: IUD in place. Indication. URINARY BLADDER: No mass or calculus. BONES: No destructive bone lesion. ADDITIONAL COMMENTS: N/A    Impression  Nephroureteral stent remains in place, stable in appearance. Diminished left-sided hydroureteronephrosis compared to the prior examination. Right and left renal calculi remain. Ureteral calculus on the left is present and unchanged. Incidental and/or nonemergent findings are as described in detail above. Medical Decision Making and ED Course   Differential Diagnosis & Medical Decision Making Provider Note:   Patient presents with flank pain. DDx: renal stone, pyelonephritis, muscular strain, testicular/ovarian pathology. Unlikely AAA given the story. Will get UA to evaluate for blood and infection and possibly imaging to evaluate for hydro. - I am the first provider for this patient. I reviewed the vital signs, available nursing notes, past medical history, past surgical history, family history and social history. The patients presenting problems have been discussed, and they are in agreement with the care plan formulated and outlined with them. I have encouraged them to ask questions as they arise throughout their visit. Vital Signs-Reviewed the patient's vital signs. No data found. ED Course:   ED Course as of 11/07/22 2235   Tue Nov 01, 2022 2030 UA + blood, large leukocyte Estrace, WBC and 1+ bacteria. [LP]   2100 Consulted with Dr Devan Ji [LP]   2230 CBC normal-no leukocytosis or anemia. CMP stable, mild increase in renal function. Patient given 1 L IVF while in the ED [LP]   2245 CT abdomen findings nephroureteral stent remains in place, stable in appearance. Diminished left-sided hydroureteronephrosis compared to the prior examination. Right and left renal calculi remain. Ureteral calculus on the left is present and unchanged.    Will PO challenge [LP]   1820 Discussed results and plan of care with patient [LP] 2330 Patient tolerating p.o. intake. Abdomen nonperitoneal.  Will discharge with Rx Keflex and refill oxycodone. Reinforced importance of hydration and follow-up with urology. Red flags and return precautions discussed. [LP]      ED Course User Index  [LP] Jazmine Vegas NP       Disposition   Disposition: Condition stable and improved  DC- Adult Discharges: All of the diagnostic tests were reviewed and questions answered. Diagnosis, care plan and treatment options were discussed. The patient understands the instructions and will follow up as directed. The patients results have been reviewed with them. They have been counseled regarding their diagnosis. The patient verbally convey understanding and agreement of the signs, symptoms, diagnosis, treatment and prognosis and additionally agrees to follow up as recommended with their PCP in 24 - 48 hours. They also agree with the care-plan and convey that all of their questions have been answered. I have also put together some discharge instructions for them that include: 1) educational information regarding their diagnosis, 2) how to care for their diagnosis at home, as well a 3) list of reasons why they would want to return to the ED prior to their follow-up appointment, should their condition change. DC-The patient was given verbal abdominal pain warning signs and and follow-up instructions  DC- Pain Control DC Home plan: Nonsteroidals, Tylenol, Narcotic pain medication, and Referral Urology    DISCHARGE PLAN:  1. Cannot display discharge medications since this patient is not currently admitted. 2.   Follow-up Information       Follow up With Specialties Details Why Contact Info    Your urologist  Call in 1 day for ER follow up     [address] DEPT Emergency Medicine  If symptoms worsen [address] [facility] [address] [phone]          3. Return to ED if worse   4.    Discharge Medication List as of 11/1/2022 11:25 PM        START taking these medications    Details   cephALEXin (Keflex) 500 mg capsule Take 1 Capsule by mouth four (4) times daily for 7 days. , Normal, Disp-28 Capsule, R-0      oxyCODONE IR (Roxicodone) 5 mg immediate release tablet Take 1 Tablet by mouth every six (6) hours as needed for Pain for up to 3 days. Max Daily Amount: 20 mg., Normal, Disp-12 Tablet, R-0           CONTINUE these medications which have NOT CHANGED    Details   ketorolac (TORADOL) 10 mg tablet Take 1 Tablet by mouth every six (6) hours as needed for Pain., Normal, Disp-20 Tablet, R-1      doxycycline (MONODOX) 100 mg capsule Take 1 Capsule by mouth two (2) times a day., Normal, Disp-20 Capsule, R-0             Diagnosis/Clinical Impression     Clinical Impression:   1. Flank pain    2. Status post laser lithotripsy of ureteral calculus    3. Renal insufficiency    4. Complicated UTI (urinary tract infection)    5. Calculus, ureter        Attestations: Lewis RIDDLE NP, am the primary clinician of record. Please note that this dictation was completed with [employer], the [employer] voice recognition software. Quite often unanticipated grammatical, syntax, homophones, and other interpretive errors are inadvertently transcribed by the computer software. Please disregard these errors. Please excuse any errors that have escaped final proofreading. Thank you.

## 2022-11-12 PROBLEM — N39.0 UTI (URINARY TRACT INFECTION): Status: RESOLVED | Noted: 2022-10-13 | Resolved: 2022-11-12

## 2022-12-01 PROBLEM — Z96.0 RETAINED URETERAL STENT: Status: ACTIVE | Noted: 2022-12-01

## 2022-12-01 NOTE — PROGRESS NOTES
CYSTOSCOPY/ STENT REMOVAL  Patient presented for cystoscopy and ureteral stent removal.  The patient was placed supine on the procedure table and the genitals were prepped and with chlorhexidine. Using 16 Polish flexible cystoscope, cystourethroscopy was performed. The urethra was patent without stricturing. The visualized portions of the bladder mucosa was without trabeculation, tumors or concerning erythema. There was a stent in the Left  ureter. It was grasped and withdrawn intact.

## 2022-12-06 ENCOUNTER — TELEPHONE (OUTPATIENT)
Dept: UROLOGY | Age: 37
End: 2022-12-06

## 2022-12-06 DIAGNOSIS — N20.0 KIDNEY STONES: Primary | ICD-10-CM

## 2022-12-06 NOTE — TELEPHONE ENCOUNTER
Spoke with pt to remind her of imaging that needs to be done. Before tomorrows appt. Pt stated that the appt time conflicts with her schedule and that she is feeling better. Wants to cancel appt; cancelled, pt satisfied.

## 2023-08-06 ENCOUNTER — HOSPITAL ENCOUNTER (EMERGENCY)
Facility: HOSPITAL | Age: 38
Discharge: HOME | End: 2023-08-06
Attending: STUDENT IN AN ORGANIZED HEALTH CARE EDUCATION/TRAINING PROGRAM
Payer: COMMERCIAL

## 2023-08-06 VITALS
TEMPERATURE: 98.4 F | HEIGHT: 65 IN | OXYGEN SATURATION: 100 % | SYSTOLIC BLOOD PRESSURE: 174 MMHG | RESPIRATION RATE: 16 BRPM | HEART RATE: 78 BPM | DIASTOLIC BLOOD PRESSURE: 91 MMHG | BODY MASS INDEX: 38.32 KG/M2 | WEIGHT: 230 LBS

## 2023-08-06 DIAGNOSIS — K04.7 DENTAL ABSCESS: ICD-10-CM

## 2023-08-06 DIAGNOSIS — K08.89 PAIN, DENTAL: Primary | ICD-10-CM

## 2023-08-06 PROCEDURE — 6360000002 HC RX W HCPCS: Performed by: STUDENT IN AN ORGANIZED HEALTH CARE EDUCATION/TRAINING PROGRAM

## 2023-08-06 PROCEDURE — 96372 THER/PROPH/DIAG INJ SC/IM: CPT

## 2023-08-06 PROCEDURE — 6370000000 HC RX 637 (ALT 250 FOR IP): Performed by: STUDENT IN AN ORGANIZED HEALTH CARE EDUCATION/TRAINING PROGRAM

## 2023-08-06 PROCEDURE — 99284 EMERGENCY DEPT VISIT MOD MDM: CPT

## 2023-08-06 RX ORDER — ACETAMINOPHEN 500 MG
1000 TABLET ORAL
Status: COMPLETED | OUTPATIENT
Start: 2023-08-06 | End: 2023-08-06

## 2023-08-06 RX ORDER — AMOXICILLIN AND CLAVULANATE POTASSIUM 875; 125 MG/1; MG/1
TABLET, FILM COATED ORAL
COMMUNITY
Start: 2023-08-04

## 2023-08-06 RX ORDER — KETOROLAC TROMETHAMINE 15 MG/ML
15 INJECTION, SOLUTION INTRAMUSCULAR; INTRAVENOUS ONCE
Status: COMPLETED | OUTPATIENT
Start: 2023-08-06 | End: 2023-08-06

## 2023-08-06 RX ADMIN — ACETAMINOPHEN 1000 MG: 500 TABLET ORAL at 02:52

## 2023-08-06 RX ADMIN — KETOROLAC TROMETHAMINE 15 MG: 15 INJECTION, SOLUTION INTRAMUSCULAR; INTRAVENOUS at 02:53

## 2023-08-06 ASSESSMENT — LIFESTYLE VARIABLES
HOW OFTEN DO YOU HAVE A DRINK CONTAINING ALCOHOL: NEVER
HOW MANY STANDARD DRINKS CONTAINING ALCOHOL DO YOU HAVE ON A TYPICAL DAY: PATIENT DOES NOT DRINK

## 2023-08-06 ASSESSMENT — PAIN DESCRIPTION - ONSET: ONSET: AWAKENED FROM SLEEP

## 2023-08-06 ASSESSMENT — PAIN - FUNCTIONAL ASSESSMENT
PAIN_FUNCTIONAL_ASSESSMENT: ACTIVITIES ARE NOT PREVENTED
PAIN_FUNCTIONAL_ASSESSMENT: 0-10

## 2023-08-06 ASSESSMENT — PAIN DESCRIPTION - ORIENTATION: ORIENTATION: RIGHT;LOWER

## 2023-08-06 ASSESSMENT — PAIN DESCRIPTION - FREQUENCY: FREQUENCY: CONTINUOUS

## 2023-08-06 ASSESSMENT — PAIN SCALES - GENERAL
PAINLEVEL_OUTOF10: 10
PAINLEVEL_OUTOF10: 9
PAINLEVEL_OUTOF10: 7

## 2023-08-06 ASSESSMENT — PAIN DESCRIPTION - LOCATION
LOCATION: TEETH

## 2023-08-06 ASSESSMENT — PAIN DESCRIPTION - DESCRIPTORS: DESCRIPTORS: TIGHTNESS;THROBBING

## 2023-08-06 ASSESSMENT — PAIN DESCRIPTION - PAIN TYPE: TYPE: CHRONIC PAIN

## 2023-08-06 NOTE — ED PROVIDER NOTES
favor the following differential diagnoses: Periapical abscess, tooth abscess, toothache, gingivitis. No concern for Willie's angina due to the absence of sublingual fullness. No concern for pharyngitis, or retropharyngeal, peritonsillar, or facial abscess due to non-suggestive physical exam.     Records Reviewed: Nursing Notes  Social Determinants of health affecting management: None    ED Course and Reassessment     ED Course:      Provided pain medications to the patient which improved her symptoms, patient is otherwise stable, she is on Augmentin and will continue it. Return to the ED with any new or worsening symptoms. Reassessment:    Patient presents with a picture of a simple periapical abscess. No concern for Willie's angina due to the absence of sublingual fullness. No concern for pharyngitis, or retropharyngeal, peritonsillar, or facial abscess due to non-suggestive physical exam.     Will provide patient with symptomatic treatment, antibiotics, and dentistry follow up with return precaution. The patient was told that if they do not follow with dentistry for management the condition may get worse and progress to fulminant infections that may compromise airway and cause significant systemic infections. Patient is tolerating PO intake at time of discharge. Understanding was insured that at this time there is no evidence for a more malignant underlying process, but that early in the process of an illness, an emergency department workup can be falsely reassuring. Routine discharge counseling was given including the fact that any worsening, changing or persistent symptoms should prompt an immediate call or follow up with their primary physician or the emergency department. The importance of appropriate follow up was also discussed. More extensive discharge instructions were given in the patient's discharge paperwork.     After completion of evaluation and discussion of results and diagnoses, all the

## 2023-08-06 NOTE — ED TRIAGE NOTES
Patient has been having pain to right, lower teeth. States has been taking abx \"for a while\". States saw PCP on Friday with start of Augmentin, no pain medication given. Also c/o swelling to right cheek area.

## 2023-08-06 NOTE — DISCHARGE INSTRUCTIONS
Thank you! Thank you for allowing me to care for you in the emergency department. I sincerely hope that you are satisfied with your visit today. It is my goal to provide you with excellent care. Below you will find a list of your labs and imaging from your visit today if applicable. Should you have any questions regarding these results please do not hesitate to call the emergency department. Please review [facility] for a more detailed result list since the below list may not be comprehensive. Instructions on how to sign up to [facility] should be provided in this packet. Labs -   No results found for this or any previous visit (from the past 12 hour(s)). Radiologic Studies -   No orders to display     [unfilled]  @[email or web address]@       If you feel that you have not received excellent quality care or timely care, please ask to speak to the nurse manager. Please choose us in the future for your continued health care needs. ------------------------------------------------------------------------------------------------------------  The exam and treatment you received in the Emergency Department were for an urgent problem and are not intended as complete care. It is very important that you follow-up with a doctor, nurse practitioner, or physician assistant in a timely manner to:  (1) confirm your diagnosis and review all imaging and lab results,  (2) re-evaluation of changes in your illness and treatment, and  (3) for ongoing care. If your symptoms become worse or you do not improve as expected and you are unable to reach your usual health care provider, you should return to the Emergency Department. We are available 24 hours a day. Please take your discharge instructions with you when you go to your follow-up appointment. If a prescription has been provided, please have it filled as soon as possible to prevent a delay in treatment. Read the entire medication instruction sheet provided to you by the pharmacy.  If

## 2023-11-20 NOTE — DISCHARGE SUMMARY
Discharge Summary       PATIENT ID: Bhavin Hardy  MRN: [id number]   YOB: 1985    DATE OF ADMISSION: 10/20/2021  3:19 AM    DATE OF DISCHARGE: 10/22/21  PRIMARY CARE PROVIDER: Kandi Franco MD     ATTENDING PHYSICIAN: Layne Leon MD  DISCHARGING PROVIDER: MEGAN Shetty    To contact this individual call [phone] and ask the  to page. If unavailable ask to be transferred the Adult Hospitalist Department. CONSULTATIONS: IP CONSULT TO UROLOGY    PROCEDURES/SURGERIES: * No surgery found *    ADMITTING DIAGNOSES & HOSPITAL COURSE:   Per [facility] \" Mike Class Caleb is a 39 y.o. female with a pmhx obstructive nephrolithiasis s/p cystoscopy with laser lithotripsy, and left ureteral stent placement by Dr. Kelli Sebastian was feeling well until 2 days ago when she developed left sided flank pain again, followed by gross hematuria.  She denies fever, or vomiting, but endorsed chills and nausea. \"  Labs were significant for WBC 14.4, creatinine 1.12 (b/l 0.72),  And UA with large blood and leukocyte esterase. Renal US showed nonobstructing renal calculi with no evidence of hydroureteronephrosis. Empirically treated with Rocephin pending urine culture. Urine cultures negative for growth.  Patient's pain has improved.            DISCHARGE DIAGNOSES / PLAN:      # Nephrolithiasis s/p L ureteral stent      - U/A consistent with Nephrolithiasis     - U/C negative for growth     - F/U with Urologist on 10/27/21     - Flomax 0.6 daily until follow-up with urologist     - Bianka Waite as needed for pain until follow-up with urologist     - Oxybutynin until follow-up with urologist       PENDING TEST RESULTS:   At the time of discharge the following test results are still pending: None    FOLLOW UP APPOINTMENTS:    Follow-up Information     Follow up With Specialties Details Why Garry Flores MD [address] [address] [phone] EMERGENCY DEPARTMENT ENCOUNTER      NAME: Katarina Mcclain  AGE: 45 year old female  YOB: 1978  MRN: [id number]  EVALUATION DATE & TIME: 11/20/2023 12:17 PM    PCP: No Ref-Primary, Physician    ED PROVIDER: Kevin Saucedo D.O.      Chief Complaint   Patient presents with    Shortness of Breath    Chest Tightness         FINAL IMPRESSION:  1. Bronchitis    2. Wheezing    3. Shortness of breath          ED COURSE & MEDICAL DECISION MAKING:    Pertinent Labs & Imaging studies reviewed. (See chart for details)  45 year old female presents to the Emergency Department for evaluation of dyspnea, chest tightness.    12:32 PM I met with the patient, obtained history, performed an initial exam, and discussed options and plan for diagnostics and treatment here in the ED.    Vital signs significant for hypertension, mild tachypnea on arrival, otherwise normal, afebrile.    Differential diagnosis includes bronchitis, viral illness, pneumonia, DVT, PE, ACS.     Patient with some mild wheezing appreciated on exam. Given duoneb and oral prednisone for this.     After duoneb, wheezing diminished and patient reports improvement in symptoms.     EKG interpreted as below. No evidence of significant dysrhythmia or ischemic changes    Labs with mild leukocytosis at 11.5. Stable anemia at 10.3. VBG with pH 7.34, normal CO2. Troponin is normal diminishing concern for ACS.  Given chronicity of symptoms, no indication for surgery troponin.  D-dimer is normal diminishing concern for PE. Creatinine and electrolytes both reassuring.     COVID-19, influenza, RSV all negative.    Chest x-ray obtained, reviewed.  No evidence of infiltrate, effusion.  Stable from prior.  Hiatal hernia noted, unchanged from prior.    Patient reassessed, reports improvement in symptoms.  Vital signs remain reassuring.  Not requiring any oxygen therapy.  Discussed potential  bronchitis, URI, environmentally related symptoms.    Given otherwise  [facility]  In 1 week  [address]:     Please make sure to follow up with your primary care physician within 1-2 weeks of discharge for hospital follow up. You should also follow up with your urologist.   - Please make sure to continue to monitor for: Chest pain, shortness of breath, high fevers, or sudden weakness or numbness and return to the Emergency Department with any of these symptoms.   - Avoid tobacco, alcohol and other illicit drug use. - Diet restrictions: None   - Activity restrictions: none   - If your symptoms return/worsen seek medical attention immediately. - Take your medications exactly as outlined in your discharge paperwork. Do not take any other medications unless specifically prescribed after your hospital stay. - Take all discharge paperwork with you to all of your follow up doctor appointments. DIET: Regular  Oral Nutritional Supplements: noneNone    ACTIVITY: As tolerated    WOUND CARE: None    EQUIPMENT needed: None      DISCHARGE MEDICATIONS:  Current Discharge Medication List      START taking these medications    Details   tamsulosin (FLOMAX) 0.4 mg capsule Take 1 Capsule by mouth daily. Qty: 30 Capsule, Refills: 0  Start date: 10/23/2021      oxybutynin (DITROPAN) 5 mg tablet Take 1 Tablet by mouth three (3) times daily. Qty: 90 Tablet, Refills: 0  Start date: 10/22/2021         CONTINUE these medications which have NOT CHANGED    Details   phenazopyridine (PYRIDIUM) 95 mg tab Take 1 Tablet by mouth three (3) times daily as needed for Pain. Qty: 10 Tablet, Refills: 0      ibuprofen (MOTRIN) 400 mg tablet Take 1 Tablet by mouth every six (6) hours as needed for Pain. Qty: 20 Tablet, Refills: 0      cholecalciferol, vitamin D3, (VITAMIN D3 PO) Take  by mouth. NOTIFY YOUR PHYSICIAN FOR ANY OF THE FOLLOWING:   Fever over 101 degrees for 24 hours.    Chest pain, shortness of breath, reassuring work-up, no immediate indication for further studies or hospitalization today.  She felt well and comfortable proceeding home with supportive cares.  We will add on albuterol nebulized solution in addition to her inhaler.  We will provide short course of prednisone in addition.    Discussed importance of follow-up with primary care to ensure symptoms improving appropriately.  ED return precautions given should she develop any new or worsening symptoms.  She voiced comfort in agreement with plan.  Independently ambulatory and discharged to home.      At the conclusion of the encounter I discussed the results of all of the tests and the disposition. The questions were answered. The patient or family acknowledged understanding and was agreeable with the care plan.     Medical Decision Making    History:  Supplemental history from: Documented in chart, if applicable  External Record(s) reviewed: Documented in chart, if applicable. and Outpatient Record: [facility] 11/16/23    Work Up:  Chart documentation includes differential considered and any EKGs or imaging independently interpreted by provider, where specified.  In additional to work up documented, I considered the following work up: Documented in chart, if applicable.    External consultation:  Discussion of management with another provider: Documented in chart, if applicable    Complicating factors:  Care impacted by chronic illness: N/A  Care affected by social determinants of health: N/A    Disposition considerations: Discharge. I prescribed additional prescription strength medication(s) as charted. See documentation for any additional details.      MEDICATIONS GIVEN IN THE EMERGENCY:  Medications   ipratropium - albuterol 0.5 mg/2.5 mg/3 mL (DUONEB) neb solution 3 mL (3 mLs Nebulization $Given 11/20/23 1238)   predniSONE (DELTASONE) tablet 60 mg (60 mg Oral $Given 11/20/23 1317)       NEW PRESCRIPTIONS STARTED AT TODAY'S ER  VISIT  Discharge Medication List as of 11/20/2023  2:41 PM        START taking these medications    Details   albuterol (PROVENTIL) (2.5 MG/3ML) 0.083% neb solution Take 1 vial (2.5 mg) by nebulization every 6 hours as needed for shortness of breath, wheezing or cough, Disp-120 mL, R-0, E-Prescribe      predniSONE (DELTASONE) 20 MG tablet Take two tablets (= 40mg) each day for 5 (five) days, Disp-10 tablet, R-0, E-Prescribe      Respiratory Therapy Supplies (NEBULIZER CUP/TUBING) ANNEMARIE Take 1 vial by mouth every 6 hours as needed, Disp-1 each, R-0, E-PrescribeInclude tubing and mask for age please.                =================================================================    HPI    Patient information was obtained from: Patient    Use of : N/A       Katarina Mcclain is a 45 year old female with a pertinent history of CARLOS, allergic rhinitis, anemia who presents to this ED by private vehicle for evaluation of congestion, chest tightness, shortness of breath.     Patient reports she has been sick off and on for the past 8 weeks. States it initially began with sinus congestion and spread downward into her chest. Reports she has been seen on a few different occasions as outpatient for this, most recently on 11/16. She was diagnosed with sinusitis on 10/11 and placed on Augmentin for 10 days with improvement, but recurrence of symptoms. Seen again on 11/3 for cough, and treated with a round of steroids and albuterol. Reports significant improvement with the albuterol and and steroids, but again over the past few days reports symptoms worsening again.     Reports over the last 5-6 days, has had increased congestion, intermittent coughing, spitting up clear sputum, shortness of breath and chest tightness. States yesterday, she was really short of breath even when getting up to go to the bathroom. Denies any fever, sick contacts, environmental exposures. Denies history of MI, heart failure, DVT, PE.  fever, chills, nausea, vomiting, diarrhea, change in mentation, falling, weakness, bleeding. Severe pain or pain not relieved by medications. Or, any other signs or symptoms that you may have questions about. DISPOSITION:   X Home With:   OT  PT  HH  RN       Long term SNF/Inpatient Rehab    Independent/assisted living    Hospice    Other:       PATIENT CONDITION AT DISCHARGE:     Functional status    Poor     Deconditioned    X Independent      Cognition    X Lucid     Forgetful     Dementia      Catheters/lines (plus indication)    Wu     PICC     PEG    X None      Code status   X  Full code     DNR      PHYSICAL EXAMINATION AT DISCHARGE:  General:  A/A/O X 3. NAD. HEENT:  Normocephalic. Sclera anicteric. EOMI. Mucous membranes moist.    Chest:  Resps even/unlabored with symmetrical CWE. Air entry full. Lungs CTA. No use of accessory muscles. CV:  RRR. Normal S1/S2. No M/C/R appreciated. No JVD. No peripheral edema. Cap refill < 3 sec. Peripheral pulses 2+. GI:  Abdomen soft/NT/ND. No organomegaly. No hernia. ABT X 4.    :  Voiding. Neurologic:  Nonfocal.  CN II-XII grossly intact. Face symmetrical.  Speech normal.     Psych:  Cooperative. No anxiety or agitation. No suicidal/homicidal ideation. Skin:  Warm and color appropriate. No rashes or jaundice. Turgor elastic. CHRONIC MEDICAL DIAGNOSES:  Problem List as of 10/22/2021 Never Reviewed        Codes Class Noted - Resolved    Acute cystitis with hematuria ICD-10-CM: N30.01  ICD-9-CM: 595.0  10/20/2021 - Present        Pyelonephritis ICD-10-CM: N12  ICD-9-CM: 590.80  10/20/2021 - Present        Hydronephrosis ICD-10-CM: N13.30  ICD-9-CM: 180  10/19/2021 - Present    Overview Addendum 10/19/2021  8:51 AM by Rosendo Massey NP     HYDRONEPHROSIS: moderate obstruction of the left kidney secondary to a mid left ureteral stone measuring 9 x 13 mm.   Plan for cystoscopy, ureteroscopy, left RPG, laser lithotripsy       REVIEW OF SYSTEMS      As per HPI    PAST MEDICAL HISTORY:  Past Medical History:   Diagnosis Date    Allergic rhinitis     Created by Conversion Replacement Utility updated for latest IMO load     Allergic rhinitis, cause unspecified     seasonal allergies    Anemia     Created by Conversion     Anxiety     Asthma     Carbuncle and furuncle of trunk 4/2001    R breast    Cervical high risk HPV (human papillomavirus) test positive 12/14/2022 08/23/17 NIL Pap, Neg HR HPV  12/7/22 NIL Pap, + HR HPV (not 16 or 18) Plan cotest in 1 year due 12/7/23 12/14/22 Pt was advised.     Generalized anxiety disorder     Created by Conversion     Hidradenitis 4/95    Hypertrophy of bone 10/9/2019    Added automatically from request for surgery [id number]     Mild major depression (H) 10/25/2018    Obesity, unspecified     Oral Allergy Syndrome     Created by Conversion     Pars defect of lumbar spine 5/17/2016    Personal history of tobacco use, presenting hazards to health     Scheuermann's kyphosis 5/17/2016    Vitamin B12 Deficiency     Created by Conversion     Vitamin D deficiency     Created by Conversion Replacement Utility updated for latest IMO load        PAST SURGICAL HISTORY:  Past Surgical History:   Procedure Laterality Date    REVISION AMADOU-EN-Y      ZZC GASTRIC BYPASS,OBESE<100CM AMADOU-EN-Y  2/27/04    [facility]           CURRENT MEDICATIONS:    albuterol (PROVENTIL) (2.5 MG/3ML) 0.083% neb solution  [START ON 11/21/2023] predniSONE (DELTASONE) 20 MG tablet  Respiratory Therapy Supplies (NEBULIZER CUP/TUBING) ANNEMARIE  albuterol (PROAIR HFA/PROVENTIL HFA/VENTOLIN HFA) 108 (90 Base) MCG/ACT inhaler  benzonatate (TESSALON) 100 MG capsule  cetirizine (ZYRTEC) 5 MG tablet  citalopram (CELEXA) 20 MG tablet  etonogestrel (NEXPLANON) 68 MG IMPL  famotidine (PEPCID) 10 MG tablet  fluticasone (FLOVENT HFA) 110 MCG/ACT inhaler  multivitamin w/minerals (MULTI-VITAMIN) tablet  omeprazole (PRILOSEC OTC) 20 MG EC  and left ureteral stent placement on 10/12/21.  10/12/21-LEFT ureteroscopy with laser lithotripsy and stone basketing, left RPG, and left ureteral stent placement               Renal stone ICD-10-CM: N20.0  ICD-9-CM: 592.0  10/10/2021 - Present    Overview Signed 10/19/2021  8:48 AM by Patrick Edmonds NP     BILATERAL RENAL STONES: CT 10/10/21 with bilateral calcified renal stones, right > left; the largest of which measures 5.6 mm (right side).                      Greater than 30 minutes were spent with the patient on counseling and coordination of care    Signed:   MEGAN Raines  10/22/2021  7:25 AM tablet  omeprazole (PRILOSEC) 20 MG DR capsule        ALLERGIES:  Allergies   Allergen Reactions    Other Environmental Allergy Other (See Comments)     Seasonal-ragweed, birch, dust mites    Oxycodone-Acetaminophen Nausea and Vomiting       FAMILY HISTORY:  Family History   Problem Relation Age of Onset    Hypertension Other         grandfather    Eye Disorder Other         grandfather cataracts    Diabetes Other         uncle    Hypertension Other         uncle    Heart Disease Other         grandfather heart disease    Heart Disease Other         grandfather heart disease    Thyroid Disease Mother     Breast Cancer Maternal Aunt     Breast Cancer Maternal Aunt     Breast Cancer Maternal Aunt        SOCIAL HISTORY:   Social History     Socioeconomic History    Marital status: Single    Number of children: 0   Tobacco Use    Smoking status: Every Day     Types: Cigarettes     Start date: 9/3/2014     Passive exposure: Past    Smokeless tobacco: Never    Tobacco comments:     no passive exposure   Vaping Use    Vaping Use: Never used   Substance and Sexual Activity    Alcohol use: Yes     Comment: Alcoholic Drinks/day: weekends    Drug use: No   Other Topics Concern    Caffeine Concern No    Exercise No    Seat Belt Yes    Self-Exams No     Social Determinants of Health     Financial Resource Strain: Low Risk  (11/16/2023)    Financial Resource Strain     Within the past 12 months, have you or your family members you live with been unable to get utilities (heat, electricity) when it was really needed?: No   Food Insecurity: Low Risk  (11/16/2023)    Food Insecurity     Within the past 12 months, did you worry that your food would run out before you got money to buy more?: No     Within the past 12 months, did the food you bought just not last and you didn t have money to get more?: No   Transportation Needs: Low Risk  (11/16/2023)    Transportation Needs     Within the past 12 months, has lack of transportation kept  "you from medical appointments, getting your medicines, non-medical meetings or appointments, work, or from getting things that you need?: No   Interpersonal Safety: Low Risk  (10/11/2023)    Interpersonal Safety     Do you feel physically and emotionally safe where you currently live?: Yes     Within the past 12 months, have you been hit, slapped, kicked or otherwise physically hurt by someone?: No     Within the past 12 months, have you been humiliated or emotionally abused in other ways by your partner or ex-partner?: No   Housing Stability: Low Risk  (11/16/2023)    Housing Stability     Do you have housing? : Yes     Are you worried about losing your housing?: No       VITALS:  BP (!) 163/108   Pulse 88   Temp 98.4  F (36.9  C) (Temporal)   Resp 20   Ht 1.702 m (5' 7\")   Wt 98.9 kg (218 lb)   SpO2 91%   BMI 34.14 kg/m      PHYSICAL EXAM    Constitutional: Well developed, Well nourished, NAD  HENT: Normocephalic, Atraumatic, Bilateral external ears normal, Oropharynx normal, mucous membranes moist, Nose normal.   Neck-  Normal range of motion, No tenderness, Supple, No stridor.   Eyes: PERRL, EOMI  Respiratory: Mildly increased work of breathing. No respiratory distress, Mild end-expiratory wheezing appreciated.   Cardiovascular: Normal heart rate, Regular rhythm, No murmurs. Chest wall nontender.    GI: Soft, No tenderness, No masses, No flank tenderness.  Musculoskeletal: 2+ DP pulses. No edema. No cyanosis,  Good range of motion in all major joints. No tenderness to palpation or major deformities noted.  Integument: Warm, Dry, No erythema, No rash.  Neurologic: Alert & oriented x 3, Normal motor function, Normal sensory function, No focal deficits noted.   Psychiatric: Affect normal, Judgment normal, Mood normal. Cooperative.     LAB:  All pertinent labs reviewed and interpreted.  Results for orders placed or performed during the hospital encounter of 11/20/23   XR Chest 2 Views    Impression    " IMPRESSION: Negative chest. Small hiatal hernia.   Basic metabolic panel   Result Value Ref Range    Sodium 138 135 - 145 mmol/L    Potassium 3.8 3.4 - 5.3 mmol/L    Chloride 102 98 - 107 mmol/L    Carbon Dioxide (CO2) 23 22 - 29 mmol/L    Anion Gap 13 7 - 15 mmol/L    Urea Nitrogen 8.0 6.0 - 20.0 mg/dL    Creatinine 0.52 0.51 - 0.95 mg/dL    GFR Estimate >90 >60 mL/min/1.73m2    Calcium 9.0 8.6 - 10.0 mg/dL    Glucose 106 (H) 70 - 99 mg/dL   Blood gas venous   Result Value Ref Range    pH Venous 7.34 (L) 7.35 - 7.45    pCO2 Venous 46 35 - 50 mm Hg    pO2 Venous 41 25 - 47 mm Hg    Bicarbonate Venous 25 24 - 30 mmol/L    Base Excess/Deficit -0.4   mmol/L    Oxyhemoglobin Venous 69.7 (L) 70.0 - 75.0 %    O2 Sat, Venous 71.4 70.0 - 75.0 %   D dimer quantitative   Result Value Ref Range    D-Dimer Quantitative 0.39 0.00 - 0.50 ug/mL FEU   Result Value Ref Range    Troponin T, High Sensitivity <6 <=14 ng/L   Symptomatic Influenza A/B, RSV, & SARS-CoV2 PCR (COVID-19) Nasopharyngeal    Specimen: Nasopharyngeal; Swab   Result Value Ref Range    Influenza A PCR Negative Negative    Influenza B PCR Negative Negative    RSV PCR Negative Negative    SARS CoV2 PCR Negative Negative   CBC with platelets and differential   Result Value Ref Range    WBC Count 11.5 (H) 4.0 - 11.0 10e3/uL    RBC Count 4.54 3.80 - 5.20 10e6/uL    Hemoglobin 10.3 (L) 11.7 - 15.7 g/dL    Hematocrit 34.2 (L) 35.0 - 47.0 %    MCV 75 (L) 78 - 100 fL    MCH 22.7 (L) 26.5 - 33.0 pg    MCHC 30.1 (L) 31.5 - 36.5 g/dL    RDW 19.5 (H) 10.0 - 15.0 %    Platelet Count 344 150 - 450 10e3/uL    % Neutrophils 50 %    % Lymphocytes 27 %    % Monocytes 9 %    % Eosinophils 13 %    % Basophils 1 %    % Immature Granulocytes 0 %    NRBCs per 100 WBC 0 <1 /100    Absolute Neutrophils 5.8 1.6 - 8.3 10e3/uL    Absolute Lymphocytes 3.1 0.8 - 5.3 10e3/uL    Absolute Monocytes 1.1 0.0 - 1.3 10e3/uL    Absolute Eosinophils 1.4 (H) 0.0 - 0.7 10e3/uL    Absolute Basophils 0.1  0.0 - 0.2 10e3/uL    Absolute Immature Granulocytes 0.0 <=0.4 10e3/uL    Absolute NRBCs 0.0 10e3/uL   Nt probnp inpatient   Result Value Ref Range    N terminal Pro BNP Inpatient <36 0 - 450 pg/mL   ECG 12-LEAD WITH MUSE (L[facility])   Result Value Ref Range    Systolic Blood Pressure 169 mmHg    Diastolic Blood Pressure 95 mmHg    Ventricular Rate 98 BPM    Atrial Rate 98 BPM    ME Interval 136 ms    QRS Duration 82 ms     ms    QTc 449 ms    P Axis 67 degrees    R AXIS 82 degrees    T Axis 49 degrees    Interpretation ECG       Sinus rhythm  Normal ECG  No previous ECGs available  Confirmed by SEE ED PROVIDER NOTE FOR, ECG INTERPRETATION ([id number]),  Sherry Alfonso ([id number]) on 11/20/2023 2:04:00 PM         RADIOLOGY:  Reviewed all pertinent imaging. Please see official radiology report.  XR Chest 2 Views   Final Result   IMPRESSION: Negative chest. Small hiatal hernia.          EKG:    Performed at: 12:14 PM    Impression: Sinus Rhythm  Normal ECG    Rate: 98  Rhythm: Sinus  ME Interval: 136  QRS Interval: 82  QTc Interval: 449  ST Changes: None  Comparison: No prior    I have independently reviewed and interpreted the EKG(s) documented above.        [employer] Documentation:   CMS Diagnoses:             YOLY Villafuerte [facility] EMERGENCY ROOM  [facility] [facility] [address] [phone]      Kevin Saucedo DO  11/20/23 [id number]

## 2024-04-07 ENCOUNTER — APPOINTMENT (OUTPATIENT)
Facility: HOSPITAL | Age: 39
End: 2024-04-07
Payer: COMMERCIAL

## 2024-04-07 ENCOUNTER — HOSPITAL ENCOUNTER (EMERGENCY)
Facility: HOSPITAL | Age: 39
Discharge: HOME | End: 2024-04-07
Attending: STUDENT IN AN ORGANIZED HEALTH CARE EDUCATION/TRAINING PROGRAM
Payer: COMMERCIAL

## 2024-04-07 VITALS
TEMPERATURE: 98.1 F | HEART RATE: 79 BPM | DIASTOLIC BLOOD PRESSURE: 97 MMHG | WEIGHT: 240 LBS | SYSTOLIC BLOOD PRESSURE: 157 MMHG | HEIGHT: 65 IN | OXYGEN SATURATION: 100 % | BODY MASS INDEX: 39.99 KG/M2 | RESPIRATION RATE: 17 BRPM

## 2024-04-07 DIAGNOSIS — N39.0 ACUTE UTI: ICD-10-CM

## 2024-04-07 DIAGNOSIS — N20.0 NEPHROLITHIASIS: Primary | ICD-10-CM

## 2024-04-07 LAB
ALBUMIN SERPL-MCNC: 3.6 G/DL (ref 3.5–5)
ALBUMIN/GLOB SERPL: 0.8 (ref 1.1–2.2)
ALP SERPL-CCNC: 122 U/L (ref 45–117)
ALT SERPL-CCNC: 18 U/L (ref 12–78)
ANION GAP SERPL CALC-SCNC: 7 MMOL/L (ref 5–15)
APPEARANCE UR: ABNORMAL
AST SERPL W P-5'-P-CCNC: 23 U/L (ref 15–37)
BACTERIA URNS QL MICRO: NEGATIVE /HPF
BASOPHILS # BLD: 0.1 K/UL (ref 0–0.1)
BASOPHILS NFR BLD: 0 % (ref 0–1)
BILIRUB SERPL-MCNC: 0.2 MG/DL (ref 0.2–1)
BILIRUB UR QL: NEGATIVE
BUN SERPL-MCNC: 14 MG/DL (ref 6–20)
BUN/CREAT SERPL: 13 (ref 12–20)
CA-I BLD-MCNC: 9 MG/DL (ref 8.5–10.1)
CHLORIDE SERPL-SCNC: 111 MMOL/L (ref 97–108)
CO2 SERPL-SCNC: 23 MMOL/L (ref 21–32)
COLOR UR: ABNORMAL
CREAT SERPL-MCNC: 1.07 MG/DL (ref 0.55–1.02)
DIFFERENTIAL METHOD BLD: ABNORMAL
EOSINOPHIL # BLD: 0.5 K/UL (ref 0–0.4)
EOSINOPHIL NFR BLD: 4 % (ref 0–7)
EPITH CASTS URNS QL MICRO: ABNORMAL /LPF
ERYTHROCYTE [DISTWIDTH] IN BLOOD BY AUTOMATED COUNT: 15.5 % (ref 11.5–14.5)
GLOBULIN SER CALC-MCNC: 4.3 G/DL (ref 2–4)
GLUCOSE SERPL-MCNC: 144 MG/DL (ref 65–100)
GLUCOSE UR STRIP.AUTO-MCNC: NEGATIVE MG/DL
HCT VFR BLD AUTO: 36.7 % (ref 35–47)
HGB BLD-MCNC: 11.6 G/DL (ref 11.5–16)
HGB UR QL STRIP: ABNORMAL
IMM GRANULOCYTES # BLD AUTO: 0.1 K/UL (ref 0–0.04)
IMM GRANULOCYTES NFR BLD AUTO: 0 % (ref 0–0.5)
KETONES UR QL STRIP.AUTO: NEGATIVE MG/DL
LEUKOCYTE ESTERASE UR QL STRIP.AUTO: ABNORMAL
LYMPHOCYTES # BLD: 1.9 K/UL (ref 0.8–3.5)
LYMPHOCYTES NFR BLD: 15 % (ref 12–49)
MCH RBC QN AUTO: 25.6 PG (ref 26–34)
MCHC RBC AUTO-ENTMCNC: 31.6 G/DL (ref 30–36.5)
MCV RBC AUTO: 80.8 FL (ref 80–99)
MONOCYTES # BLD: 1 K/UL (ref 0–1)
MONOCYTES NFR BLD: 8 % (ref 5–13)
MUCOUS THREADS URNS QL MICRO: ABNORMAL /LPF
NEUTS SEG # BLD: 9.5 K/UL (ref 1.8–8)
NEUTS SEG NFR BLD: 73 % (ref 32–75)
NITRITE UR QL STRIP.AUTO: NEGATIVE
NRBC # BLD: 0 K/UL (ref 0–0.01)
NRBC BLD-RTO: 0 PER 100 WBC
PH UR STRIP: 5 (ref 5–8)
PLATELET # BLD AUTO: 428 K/UL (ref 150–400)
PMV BLD AUTO: 9.7 FL (ref 8.9–12.9)
POTASSIUM SERPL-SCNC: 3.6 MMOL/L (ref 3.5–5.1)
PROT SERPL-MCNC: 7.9 G/DL (ref 6.4–8.2)
PROT UR STRIP-MCNC: 100 MG/DL
RBC # BLD AUTO: 4.54 M/UL (ref 3.8–5.2)
RBC #/AREA URNS HPF: >100 /HPF (ref 0–5)
SODIUM SERPL-SCNC: 141 MMOL/L (ref 136–145)
SP GR UR REFRACTOMETRY: 1.02 (ref 1–1.03)
URINE CULTURE IF INDICATED: ABNORMAL
UROBILINOGEN UR QL STRIP.AUTO: 0.1 EU/DL (ref 0.1–1)
WBC # BLD AUTO: 13 K/UL (ref 3.6–11)
WBC URNS QL MICRO: ABNORMAL /HPF (ref 0–4)

## 2024-04-07 PROCEDURE — 6360000002 HC RX W HCPCS: Performed by: STUDENT IN AN ORGANIZED HEALTH CARE EDUCATION/TRAINING PROGRAM

## 2024-04-07 PROCEDURE — 99284 EMERGENCY DEPT VISIT MOD MDM: CPT

## 2024-04-07 PROCEDURE — 85025 COMPLETE CBC W/AUTO DIFF WBC: CPT

## 2024-04-07 PROCEDURE — 2500000003 HC RX 250 WO HCPCS: Performed by: STUDENT IN AN ORGANIZED HEALTH CARE EDUCATION/TRAINING PROGRAM

## 2024-04-07 PROCEDURE — 81001 URINALYSIS AUTO W/SCOPE: CPT

## 2024-04-07 PROCEDURE — 36415 COLL VENOUS BLD VENIPUNCTURE: CPT

## 2024-04-07 PROCEDURE — 80053 COMPREHEN METABOLIC PANEL: CPT

## 2024-04-07 PROCEDURE — 96374 THER/PROPH/DIAG INJ IV PUSH: CPT

## 2024-04-07 PROCEDURE — 74176 CT ABD & PELVIS W/O CONTRAST: CPT

## 2024-04-07 PROCEDURE — 2580000003 HC RX 258: Performed by: STUDENT IN AN ORGANIZED HEALTH CARE EDUCATION/TRAINING PROGRAM

## 2024-04-07 PROCEDURE — 96375 TX/PRO/DX INJ NEW DRUG ADDON: CPT

## 2024-04-07 PROCEDURE — 87086 URINE CULTURE/COLONY COUNT: CPT

## 2024-04-07 RX ORDER — SODIUM CHLORIDE, SODIUM LACTATE, POTASSIUM CHLORIDE, AND CALCIUM CHLORIDE .6; .31; .03; .02 G/100ML; G/100ML; G/100ML; G/100ML
1000 INJECTION, SOLUTION INTRAVENOUS ONCE
Status: COMPLETED | OUTPATIENT
Start: 2024-04-07 | End: 2024-04-07

## 2024-04-07 RX ORDER — ACETAMINOPHEN 500 MG
1000 TABLET ORAL EVERY 6 HOURS PRN
Qty: 40 TABLET | Refills: 1 | Status: SHIPPED | OUTPATIENT
Start: 2024-04-07

## 2024-04-07 RX ORDER — ONDANSETRON 2 MG/ML
4 INJECTION INTRAMUSCULAR; INTRAVENOUS ONCE
Status: COMPLETED | OUTPATIENT
Start: 2024-04-07 | End: 2024-04-07

## 2024-04-07 RX ORDER — KETOROLAC TROMETHAMINE 10 MG/1
10 TABLET, FILM COATED ORAL EVERY 6 HOURS PRN
Qty: 20 TABLET | Refills: 0 | Status: SHIPPED | OUTPATIENT
Start: 2024-04-07 | End: 2024-04-12

## 2024-04-07 RX ORDER — ONDANSETRON 4 MG/1
4 TABLET, ORALLY DISINTEGRATING ORAL EVERY 8 HOURS PRN
Qty: 20 TABLET | Refills: 0 | Status: SHIPPED | OUTPATIENT
Start: 2024-04-07 | End: 2024-04-14

## 2024-04-07 RX ORDER — CEFPODOXIME PROXETIL 200 MG/1
200 TABLET, FILM COATED ORAL 2 TIMES DAILY
Qty: 20 TABLET | Refills: 0 | Status: SHIPPED | OUTPATIENT
Start: 2024-04-07 | End: 2024-04-17

## 2024-04-07 RX ORDER — HYDROMORPHONE HYDROCHLORIDE 1 MG/ML
1 INJECTION, SOLUTION INTRAMUSCULAR; INTRAVENOUS; SUBCUTANEOUS
Status: COMPLETED | OUTPATIENT
Start: 2024-04-07 | End: 2024-04-07

## 2024-04-07 RX ADMIN — CEFTRIAXONE SODIUM 1000 MG: 1 INJECTION, POWDER, FOR SOLUTION INTRAMUSCULAR; INTRAVENOUS at 08:11

## 2024-04-07 RX ADMIN — ONDANSETRON 4 MG: 2 INJECTION INTRAMUSCULAR; INTRAVENOUS at 04:51

## 2024-04-07 RX ADMIN — SODIUM CHLORIDE, POTASSIUM CHLORIDE, SODIUM LACTATE AND CALCIUM CHLORIDE 1000 ML: 600; 310; 30; 20 INJECTION, SOLUTION INTRAVENOUS at 04:58

## 2024-04-07 RX ADMIN — HYDROMORPHONE HYDROCHLORIDE 1 MG: 1 INJECTION, SOLUTION INTRAMUSCULAR; INTRAVENOUS; SUBCUTANEOUS at 04:52

## 2024-04-07 ASSESSMENT — PAIN SCALES - GENERAL
PAINLEVEL_OUTOF10: 3
PAINLEVEL_OUTOF10: 9

## 2024-04-07 ASSESSMENT — LIFESTYLE VARIABLES
HOW MANY STANDARD DRINKS CONTAINING ALCOHOL DO YOU HAVE ON A TYPICAL DAY: 1 OR 2
HOW OFTEN DO YOU HAVE A DRINK CONTAINING ALCOHOL: MONTHLY OR LESS

## 2024-04-07 ASSESSMENT — PAIN DESCRIPTION - LOCATION: LOCATION: ABDOMEN;BACK

## 2024-04-07 ASSESSMENT — PAIN - FUNCTIONAL ASSESSMENT
PAIN_FUNCTIONAL_ASSESSMENT: 0-10
PAIN_FUNCTIONAL_ASSESSMENT: 0-10

## 2024-04-07 NOTE — ED PROVIDER NOTES
possible infection will cover with antibiotics.  Patient is given a dose of ceftriaxone in the ED and will be discharged with cephalexin.  Notably, patient was tachycardic initially on assessment.  However, this could be secondary to pain.  Has resolved.    SEPSIS Reassessment: Sepsis reassessment not applicable      Patient was given the following medications:  Medications   HYDROmorphone HCl PF (DILAUDID) injection 1 mg (1 mg IntraVENous Given 4/7/24 3222)   ondansetron (ZOFRAN) injection 4 mg (4 mg IntraVENous Given 4/7/24 9751)   lactated ringers bolus 1,000 mL (0 mLs IntraVENous Stopped 4/7/24 0565)   cefTRIAXone (ROCEPHIN) 1,000 mg in sterile water 10 mL IV syringe (1,000 mg IntraVENous Given 4/7/24 8898)     After completion of workup and discussion of results and diagnoses with the patient, all the patient's questions were answered. If required, all follow up appointments and treatments were discussed and explained. The patient sounded understanding and agrees with the plan.    The patient understands that at this time there is no evidence for a more malignant underlying process, but the patient also understands that early in the process of an illness, an emergency department workup can be falsely reassuring. Routine discharge counseling was given to the patient and the patient understands that worsening, changing or persistent symptoms should prompt an immediate call or follow up with their primary physician or the emergency department. The importance of appropriate follow up was also discussed with the patient. More extensive discharge instructions were given in the patient's discharge paperwork.      ED IMPRESSION     1. Nephrolithiasis    2. Acute UTI          DISPOSITION/PLAN   DISPOSITION Decision To Discharge 04/07/2024 08:19:20 AM    Discharge Note: The patient is stable for discharge home. The signs, symptoms, diagnosis, and discharge instructions have been discussed, understanding conveyed, and

## 2024-04-08 LAB
BACTERIA SPEC CULT: NORMAL
COLONY COUNT, CNT: NORMAL
COLONY COUNT, CNT: NORMAL
Lab: NORMAL

## 2024-05-09 ENCOUNTER — OFFICE VISIT (OUTPATIENT)
Age: 39
End: 2024-05-09
Payer: COMMERCIAL

## 2024-05-09 VITALS
RESPIRATION RATE: 16 BRPM | TEMPERATURE: 98 F | DIASTOLIC BLOOD PRESSURE: 91 MMHG | HEART RATE: 80 BPM | OXYGEN SATURATION: 99 % | SYSTOLIC BLOOD PRESSURE: 143 MMHG | HEIGHT: 65 IN | WEIGHT: 242 LBS | BODY MASS INDEX: 40.32 KG/M2

## 2024-05-09 DIAGNOSIS — R79.89 LOW VITAMIN D LEVEL: Primary | ICD-10-CM

## 2024-05-09 DIAGNOSIS — E05.90 HYPERTHYROIDISM: ICD-10-CM

## 2024-05-09 PROCEDURE — 99204 OFFICE O/P NEW MOD 45 MIN: CPT | Performed by: STUDENT IN AN ORGANIZED HEALTH CARE EDUCATION/TRAINING PROGRAM

## 2024-05-09 NOTE — PROGRESS NOTES
Chief Complaint   Patient presents with    New Patient    Thyrotoxicosis     BP (!) 146/88 (Site: Left Upper Arm, Position: Sitting, Cuff Size: Large Adult)   Pulse 80   Temp 98 °F (36.7 °C) (Temporal)   Resp 16   Ht 1.651 m (5' 5\")   Wt 109.8 kg (242 lb)   SpO2 99%   BMI 40.27 kg/m²     BP (!) 143/91 (Site: Left Upper Arm, Position: Sitting, Cuff Size: Large Adult)   Pulse 80   Temp 98 °F (36.7 °C) (Temporal)   Resp 16   Ht 1.651 m (5' 5\")   Wt 109.8 kg (242 lb)   SpO2 99%   BMI 40.27 kg/m²

## 2024-05-09 NOTE — PROGRESS NOTES
[facility]                                                                                    Brandy Walton M.D          Patient Information  Date:2024  Name : Shy Duarte 38 y.o.     YOB: 1985         Referred by: Shawna Brar MD       Chief Complaint   Patient presents with    New Patient    Thyrotoxicosis       History of Present Illness: Shy Duarte is a 38 y.o. female with history of kidney stones         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           Past Medical History:   Diagnosis Date    Kidney calculi        Past Surgical History:   Procedure Laterality Date     SECTION      LITHOTRIPSY      LITHOTRIPSY  2017    \"multiple times but 2017 was most recent\"    OTHER SURGICAL HISTORY  2020    intrauterine device (IUD)    TUBAL LIGATION  2010    UROLOGICAL SURGERY Left 10/27/2022    Left  Extracorporeal shockwave lithotripsy    UROLOGICAL SURGERY Left 10/13/2022    Cystoscopy with double-J stent placement on the left    UROLOGICAL SURGERY Right 2022    right diagnostic ureteroscopy    UROLOGICAL SURGERY  2022    Right retrograde,     UROLOGICAL SURGERY  2022    cystoscopy    UROLOGICAL SURGERY  10/27/2021    cystoscopy stent remove    UROLOGICAL SURGERY  10/12/2021    LEFT URETERAL STENT PLACEMENT     UROLOGICAL SURGERY  10/12/2021    BILATERAL RETROGRADE PYELOGRAM;

## 2024-05-10 LAB
T3 SERPL-MCNC: 109 NG/DL (ref 71–180)
T4 FREE SERPL-MCNC: 1.05 NG/DL (ref 0.82–1.77)
TSH RECEP AB SER-ACNC: <1.1 IU/L (ref 0–1.75)
TSH SERPL DL<=0.005 MIU/L-ACNC: 1.55 UIU/ML (ref 0.45–4.5)

## 2024-05-11 LAB — TSI SER-ACNC: <0.1 IU/L (ref 0–0.55)

## 2024-08-09 ENCOUNTER — OFFICE VISIT (OUTPATIENT)
Age: 39
End: 2024-08-09
Payer: COMMERCIAL

## 2024-08-09 VITALS
TEMPERATURE: 98.8 F | HEIGHT: 65 IN | SYSTOLIC BLOOD PRESSURE: 144 MMHG | WEIGHT: 243.2 LBS | HEART RATE: 81 BPM | OXYGEN SATURATION: 99 % | BODY MASS INDEX: 40.52 KG/M2 | DIASTOLIC BLOOD PRESSURE: 90 MMHG

## 2024-08-09 DIAGNOSIS — R79.89 LOW VITAMIN D LEVEL: Primary | ICD-10-CM

## 2024-08-09 DIAGNOSIS — E05.90 HYPERTHYROIDISM: ICD-10-CM

## 2024-08-09 DIAGNOSIS — E04.1 THYROID NODULE: ICD-10-CM

## 2024-08-09 PROCEDURE — 99213 OFFICE O/P EST LOW 20 MIN: CPT | Performed by: STUDENT IN AN ORGANIZED HEALTH CARE EDUCATION/TRAINING PROGRAM

## 2024-08-09 RX ORDER — ERGOCALCIFEROL 1.25 MG/1
50000 CAPSULE ORAL
Qty: 6 CAPSULE | Refills: 0 | Status: SHIPPED | OUTPATIENT
Start: 2024-08-09

## 2024-08-09 RX ORDER — OXYCODONE HYDROCHLORIDE AND ACETAMINOPHEN 5; 325 MG/1; MG/1
TABLET ORAL
COMMUNITY
Start: 2024-07-22 | End: 2024-08-09

## 2024-08-09 RX ORDER — IBUPROFEN 800 MG/1
TABLET ORAL
COMMUNITY
Start: 2024-08-07

## 2024-08-09 RX ORDER — PSEUDOEPHEDRINE HCL 30 MG
100 TABLET ORAL
COMMUNITY
Start: 2024-07-22

## 2024-08-09 RX ORDER — ONDANSETRON 4 MG/1
4 TABLET, FILM COATED ORAL EVERY 8 HOURS PRN
COMMUNITY
Start: 2024-08-06

## 2024-08-09 RX ORDER — BUPIVACAINE HYDROCHLORIDE AND EPINEPHRINE 5; 5 MG/ML; UG/ML
INJECTION, SOLUTION EPIDURAL; INTRACAUDAL; PERINEURAL
COMMUNITY
Start: 2024-07-22

## 2024-08-09 NOTE — PROGRESS NOTES
[facility]                                                                                    Brandy Walton M.D          Patient Information  Date:2024  Name : Shy Duarte 39 y.o.     YOB: 1985         Referred by: Shawna Brar MD       Chief Complaint   Patient presents with    Follow-up    Other     Low vitamin D level      Thyroid Problem       History of Present Illness: Shy Duarte is a 39 y.o. female with history of kidney stones  who presented to follow up    24   Had hysterectomy  Reports weight gain.         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           Past Medical History:   Diagnosis Date    Hyperthyroidism     Kidney calculi        Past Surgical History:   Procedure Laterality Date     SECTION  2010    HYSTEROTOMY  2024    LITHOTRIPSY      LITHOTRIPSY  2017    \"multiple times but 2017 was most recent\"    OTHER SURGICAL HISTORY  2020    intrauterine device (IUD)    TUBAL LIGATION  2010    UROLOGICAL SURGERY Left 10/27/2022    Left  Extracorporeal shockwave lithotripsy    UROLOGICAL SURGERY Left 10/13/2022    Cystoscopy with double-J stent placement on the left    UROLOGICAL SURGERY Right 2022    right diagnostic ureteroscopy    UROLOGICAL SURGERY  2022    Right retrograde,     UROLOGICAL SURGERY  2022    cystoscopy    UROLOGICAL SURGERY  10/27/2021

## 2024-08-09 NOTE — PROGRESS NOTES
Chief Complaint   Patient presents with    Follow-up    Other     Low vitamin D level      Thyroid Problem       BP (!) 144/90 (Site: Left Upper Arm, Position: Sitting, Cuff Size: Medium Adult)   Pulse 81   Temp 98.8 °F (37.1 °C) (Temporal)   Ht 1.651 m (5' 5\")   Wt 110.3 kg (243 lb 3.2 oz)   LMP 03/22/2024   SpO2 99%   BMI 40.47 kg/m²

## 2024-11-13 ENCOUNTER — HOSPITAL ENCOUNTER (INPATIENT)
Facility: HOSPITAL | Age: 39
LOS: 1 days | Discharge: HOME | DRG: 465 | End: 2024-11-14
Attending: EMERGENCY MEDICINE | Admitting: STUDENT IN AN ORGANIZED HEALTH CARE EDUCATION/TRAINING PROGRAM
Payer: COMMERCIAL

## 2024-11-13 ENCOUNTER — APPOINTMENT (OUTPATIENT)
Facility: HOSPITAL | Age: 39
DRG: 465 | End: 2024-11-13
Payer: COMMERCIAL

## 2024-11-13 DIAGNOSIS — N20.0 KIDNEY STONE: Primary | ICD-10-CM

## 2024-11-13 LAB
APPEARANCE UR: CLEAR
BACTERIA URNS QL MICRO: NEGATIVE /HPF
BILIRUB UR QL: NEGATIVE
COLOR UR: ABNORMAL
EPITH CASTS URNS QL MICRO: ABNORMAL /LPF
GLUCOSE UR STRIP.AUTO-MCNC: NEGATIVE MG/DL
HCG, URINE, POC: NEGATIVE
HGB UR QL STRIP: ABNORMAL
KETONES UR QL STRIP.AUTO: NEGATIVE MG/DL
LEUKOCYTE ESTERASE UR QL STRIP.AUTO: ABNORMAL
Lab: NORMAL
MUCOUS THREADS URNS QL MICRO: ABNORMAL /LPF
NEGATIVE QC PASS/FAIL: NORMAL
NITRITE UR QL STRIP.AUTO: NEGATIVE
PH UR STRIP: 5 (ref 5–8)
POSITIVE QC PASS/FAIL: NORMAL
PROT UR STRIP-MCNC: 100 MG/DL
RBC #/AREA URNS HPF: ABNORMAL /HPF (ref 0–5)
SP GR UR REFRACTOMETRY: 1.02 (ref 1–1.03)
UROBILINOGEN UR QL STRIP.AUTO: 0.1 EU/DL (ref 0.1–1)
WBC URNS QL MICRO: ABNORMAL /HPF (ref 0–4)

## 2024-11-13 PROCEDURE — 99285 EMERGENCY DEPT VISIT HI MDM: CPT

## 2024-11-13 PROCEDURE — 81001 URINALYSIS AUTO W/SCOPE: CPT

## 2024-11-13 PROCEDURE — 96374 THER/PROPH/DIAG INJ IV PUSH: CPT

## 2024-11-13 PROCEDURE — 2580000003 HC RX 258: Performed by: EMERGENCY MEDICINE

## 2024-11-13 PROCEDURE — 96375 TX/PRO/DX INJ NEW DRUG ADDON: CPT

## 2024-11-13 PROCEDURE — 96376 TX/PRO/DX INJ SAME DRUG ADON: CPT

## 2024-11-13 PROCEDURE — 6360000002 HC RX W HCPCS: Performed by: EMERGENCY MEDICINE

## 2024-11-13 PROCEDURE — 74176 CT ABD & PELVIS W/O CONTRAST: CPT

## 2024-11-13 RX ORDER — KETOROLAC TROMETHAMINE 30 MG/ML
30 INJECTION, SOLUTION INTRAMUSCULAR; INTRAVENOUS ONCE
Status: COMPLETED | OUTPATIENT
Start: 2024-11-13 | End: 2024-11-13

## 2024-11-13 RX ORDER — MORPHINE SULFATE 4 MG/ML
4 INJECTION, SOLUTION INTRAMUSCULAR; INTRAVENOUS
Status: COMPLETED | OUTPATIENT
Start: 2024-11-13 | End: 2024-11-13

## 2024-11-13 RX ORDER — 0.9 % SODIUM CHLORIDE 0.9 %
1000 INTRAVENOUS SOLUTION INTRAVENOUS ONCE
Status: COMPLETED | OUTPATIENT
Start: 2024-11-13 | End: 2024-11-13

## 2024-11-13 RX ORDER — ONDANSETRON 2 MG/ML
4 INJECTION INTRAMUSCULAR; INTRAVENOUS ONCE
Status: COMPLETED | OUTPATIENT
Start: 2024-11-13 | End: 2024-11-13

## 2024-11-13 RX ORDER — HYDROMORPHONE HYDROCHLORIDE 1 MG/ML
1 INJECTION, SOLUTION INTRAMUSCULAR; INTRAVENOUS; SUBCUTANEOUS
Status: COMPLETED | OUTPATIENT
Start: 2024-11-13 | End: 2024-11-14

## 2024-11-13 RX ADMIN — ONDANSETRON 4 MG: 2 INJECTION INTRAMUSCULAR; INTRAVENOUS at 22:57

## 2024-11-13 RX ADMIN — ONDANSETRON 4 MG: 2 INJECTION INTRAMUSCULAR; INTRAVENOUS at 23:24

## 2024-11-13 RX ADMIN — MORPHINE SULFATE 4 MG: 4 INJECTION, SOLUTION INTRAMUSCULAR; INTRAVENOUS at 23:22

## 2024-11-13 RX ADMIN — KETOROLAC TROMETHAMINE 30 MG: 30 INJECTION, SOLUTION INTRAMUSCULAR; INTRAVENOUS at 22:57

## 2024-11-13 RX ADMIN — SODIUM CHLORIDE 1000 ML: 9 INJECTION, SOLUTION INTRAVENOUS at 22:55

## 2024-11-13 ASSESSMENT — PAIN DESCRIPTION - LOCATION
LOCATION: FLANK
LOCATION: BACK

## 2024-11-13 ASSESSMENT — PAIN - FUNCTIONAL ASSESSMENT: PAIN_FUNCTIONAL_ASSESSMENT: 0-10

## 2024-11-13 ASSESSMENT — PAIN SCALES - GENERAL
PAINLEVEL_OUTOF10: 10

## 2024-11-13 ASSESSMENT — PAIN DESCRIPTION - ORIENTATION: ORIENTATION: LEFT

## 2024-11-14 VITALS
BODY MASS INDEX: 37.49 KG/M2 | OXYGEN SATURATION: 97 % | DIASTOLIC BLOOD PRESSURE: 70 MMHG | SYSTOLIC BLOOD PRESSURE: 120 MMHG | HEIGHT: 65 IN | TEMPERATURE: 98.1 F | RESPIRATION RATE: 16 BRPM | HEART RATE: 74 BPM | WEIGHT: 225 LBS

## 2024-11-14 PROBLEM — N20.0 KIDNEY STONE: Status: RESOLVED | Noted: 2022-10-13 | Resolved: 2024-11-14

## 2024-11-14 LAB
ALBUMIN SERPL-MCNC: 3.4 G/DL (ref 3.5–5)
ALBUMIN/GLOB SERPL: 0.9 (ref 1.1–2.2)
ALP SERPL-CCNC: 128 U/L (ref 45–117)
ALT SERPL-CCNC: 19 U/L (ref 12–78)
ANION GAP SERPL CALC-SCNC: 5 MMOL/L (ref 2–12)
AST SERPL W P-5'-P-CCNC: 19 U/L (ref 15–37)
BASOPHILS # BLD: 0.1 K/UL (ref 0–0.1)
BASOPHILS NFR BLD: 1 % (ref 0–1)
BILIRUB SERPL-MCNC: 0.3 MG/DL (ref 0.2–1)
BUN SERPL-MCNC: 18 MG/DL (ref 6–20)
BUN/CREAT SERPL: 17 (ref 12–20)
CA-I BLD-MCNC: 8.7 MG/DL (ref 8.5–10.1)
CHLORIDE SERPL-SCNC: 110 MMOL/L (ref 97–108)
CO2 SERPL-SCNC: 24 MMOL/L (ref 21–32)
CREAT SERPL-MCNC: 1.04 MG/DL (ref 0.55–1.02)
DIFFERENTIAL METHOD BLD: ABNORMAL
EOSINOPHIL # BLD: 0.2 K/UL (ref 0–0.4)
EOSINOPHIL NFR BLD: 2 % (ref 0–7)
ERYTHROCYTE [DISTWIDTH] IN BLOOD BY AUTOMATED COUNT: 14.2 % (ref 11.5–14.5)
GLOBULIN SER CALC-MCNC: 3.7 G/DL (ref 2–4)
GLUCOSE SERPL-MCNC: 144 MG/DL (ref 65–100)
HCT VFR BLD AUTO: 38.4 % (ref 35–47)
HGB BLD-MCNC: 12.1 G/DL (ref 11.5–16)
IMM GRANULOCYTES # BLD AUTO: 0.1 K/UL (ref 0–0.04)
IMM GRANULOCYTES NFR BLD AUTO: 1 % (ref 0–0.5)
LACTATE BLD-SCNC: 1.08 MMOL/L (ref 0.4–2)
LACTATE SERPL-SCNC: 1.2 MMOL/L (ref 0.4–2)
LYMPHOCYTES # BLD: 1.6 K/UL (ref 0.8–3.5)
LYMPHOCYTES NFR BLD: 13 % (ref 12–49)
MCH RBC QN AUTO: 26.9 PG (ref 26–34)
MCHC RBC AUTO-ENTMCNC: 31.5 G/DL (ref 30–36.5)
MCV RBC AUTO: 85.3 FL (ref 80–99)
MONOCYTES # BLD: 0.5 K/UL (ref 0–1)
MONOCYTES NFR BLD: 4 % (ref 5–13)
NEUTS SEG # BLD: 9.9 K/UL (ref 1.8–8)
NEUTS SEG NFR BLD: 79 % (ref 32–75)
NRBC # BLD: 0 K/UL (ref 0–0.01)
NRBC BLD-RTO: 0 PER 100 WBC
PERFORMED BY:: NORMAL
PLATELET # BLD AUTO: 355 K/UL (ref 150–400)
PMV BLD AUTO: 11 FL (ref 8.9–12.9)
POTASSIUM SERPL-SCNC: 4.5 MMOL/L (ref 3.5–5.1)
PROCALCITONIN SERPL-MCNC: <0.05 NG/ML
PROT SERPL-MCNC: 7.1 G/DL (ref 6.4–8.2)
RBC # BLD AUTO: 4.5 M/UL (ref 3.8–5.2)
SODIUM SERPL-SCNC: 139 MMOL/L (ref 136–145)
WBC # BLD AUTO: 12.3 K/UL (ref 3.6–11)

## 2024-11-14 PROCEDURE — 2500000003 HC RX 250 WO HCPCS: Performed by: EMERGENCY MEDICINE

## 2024-11-14 PROCEDURE — 6360000002 HC RX W HCPCS: Performed by: EMERGENCY MEDICINE

## 2024-11-14 PROCEDURE — 80053 COMPREHEN METABOLIC PANEL: CPT

## 2024-11-14 PROCEDURE — 83605 ASSAY OF LACTIC ACID: CPT

## 2024-11-14 PROCEDURE — 87040 BLOOD CULTURE FOR BACTERIA: CPT

## 2024-11-14 PROCEDURE — 84145 PROCALCITONIN (PCT): CPT

## 2024-11-14 PROCEDURE — 96376 TX/PRO/DX INJ SAME DRUG ADON: CPT

## 2024-11-14 PROCEDURE — 2580000003 HC RX 258: Performed by: STUDENT IN AN ORGANIZED HEALTH CARE EDUCATION/TRAINING PROGRAM

## 2024-11-14 PROCEDURE — 2580000003 HC RX 258: Performed by: EMERGENCY MEDICINE

## 2024-11-14 PROCEDURE — 96375 TX/PRO/DX INJ NEW DRUG ADDON: CPT

## 2024-11-14 PROCEDURE — 99222 1ST HOSP IP/OBS MODERATE 55: CPT | Performed by: STUDENT IN AN ORGANIZED HEALTH CARE EDUCATION/TRAINING PROGRAM

## 2024-11-14 PROCEDURE — 6360000002 HC RX W HCPCS: Performed by: STUDENT IN AN ORGANIZED HEALTH CARE EDUCATION/TRAINING PROGRAM

## 2024-11-14 PROCEDURE — 6370000000 HC RX 637 (ALT 250 FOR IP): Performed by: STUDENT IN AN ORGANIZED HEALTH CARE EDUCATION/TRAINING PROGRAM

## 2024-11-14 PROCEDURE — 36415 COLL VENOUS BLD VENIPUNCTURE: CPT

## 2024-11-14 PROCEDURE — 1100000000 HC RM PRIVATE

## 2024-11-14 PROCEDURE — 85025 COMPLETE CBC W/AUTO DIFF WBC: CPT

## 2024-11-14 RX ORDER — SODIUM CHLORIDE 9 MG/ML
INJECTION, SOLUTION INTRAVENOUS CONTINUOUS
Status: DISCONTINUED | OUTPATIENT
Start: 2024-11-14 | End: 2024-11-14

## 2024-11-14 RX ORDER — OXYCODONE HYDROCHLORIDE 5 MG/1
5 TABLET ORAL EVERY 4 HOURS PRN
Status: DISCONTINUED | OUTPATIENT
Start: 2024-11-14 | End: 2024-11-14

## 2024-11-14 RX ORDER — HYDROMORPHONE HYDROCHLORIDE 1 MG/ML
0.5 INJECTION, SOLUTION INTRAMUSCULAR; INTRAVENOUS; SUBCUTANEOUS
Status: DISCONTINUED | OUTPATIENT
Start: 2024-11-14 | End: 2024-11-14

## 2024-11-14 RX ORDER — KETOROLAC TROMETHAMINE 30 MG/ML
30 INJECTION, SOLUTION INTRAMUSCULAR; INTRAVENOUS EVERY 6 HOURS
Status: DISCONTINUED | OUTPATIENT
Start: 2024-11-14 | End: 2024-11-14

## 2024-11-14 RX ORDER — ONDANSETRON 4 MG/1
4 TABLET, ORALLY DISINTEGRATING ORAL EVERY 8 HOURS PRN
Status: DISCONTINUED | OUTPATIENT
Start: 2024-11-14 | End: 2024-11-14 | Stop reason: HOSPADM

## 2024-11-14 RX ORDER — SODIUM CHLORIDE 0.9 % (FLUSH) 0.9 %
5-40 SYRINGE (ML) INJECTION EVERY 12 HOURS SCHEDULED
Status: DISCONTINUED | OUTPATIENT
Start: 2024-11-14 | End: 2024-11-14 | Stop reason: HOSPADM

## 2024-11-14 RX ORDER — HYDROMORPHONE HYDROCHLORIDE 1 MG/ML
2 INJECTION, SOLUTION INTRAMUSCULAR; INTRAVENOUS; SUBCUTANEOUS
Status: COMPLETED | OUTPATIENT
Start: 2024-11-14 | End: 2024-11-14

## 2024-11-14 RX ORDER — ENOXAPARIN SODIUM 100 MG/ML
30 INJECTION SUBCUTANEOUS 2 TIMES DAILY
Status: DISCONTINUED | OUTPATIENT
Start: 2024-11-14 | End: 2024-11-14 | Stop reason: HOSPADM

## 2024-11-14 RX ORDER — ACETAMINOPHEN 325 MG/1
650 TABLET ORAL EVERY 6 HOURS SCHEDULED
Status: DISCONTINUED | OUTPATIENT
Start: 2024-11-14 | End: 2024-11-14 | Stop reason: HOSPADM

## 2024-11-14 RX ORDER — KETOROLAC TROMETHAMINE 30 MG/ML
30 INJECTION, SOLUTION INTRAMUSCULAR; INTRAVENOUS EVERY 6 HOURS
Status: DISCONTINUED | OUTPATIENT
Start: 2024-11-14 | End: 2024-11-14 | Stop reason: HOSPADM

## 2024-11-14 RX ORDER — SULFAMETHOXAZOLE AND TRIMETHOPRIM 800; 160 MG/1; MG/1
1 TABLET ORAL 2 TIMES DAILY
Qty: 10 TABLET | Refills: 0 | Status: SHIPPED | OUTPATIENT
Start: 2024-11-14 | End: 2024-11-19

## 2024-11-14 RX ORDER — POLYETHYLENE GLYCOL 3350 17 G/17G
17 POWDER, FOR SOLUTION ORAL DAILY PRN
Status: DISCONTINUED | OUTPATIENT
Start: 2024-11-14 | End: 2024-11-14 | Stop reason: HOSPADM

## 2024-11-14 RX ORDER — SODIUM CHLORIDE 0.9 % (FLUSH) 0.9 %
5-40 SYRINGE (ML) INJECTION PRN
Status: DISCONTINUED | OUTPATIENT
Start: 2024-11-14 | End: 2024-11-14 | Stop reason: HOSPADM

## 2024-11-14 RX ORDER — TAMSULOSIN HYDROCHLORIDE 0.4 MG/1
0.4 CAPSULE ORAL DAILY
Status: DISCONTINUED | OUTPATIENT
Start: 2024-11-14 | End: 2024-11-14 | Stop reason: HOSPADM

## 2024-11-14 RX ORDER — ONDANSETRON 2 MG/ML
4 INJECTION INTRAMUSCULAR; INTRAVENOUS EVERY 6 HOURS PRN
Status: DISCONTINUED | OUTPATIENT
Start: 2024-11-14 | End: 2024-11-14 | Stop reason: HOSPADM

## 2024-11-14 RX ORDER — SODIUM CHLORIDE 9 MG/ML
INJECTION, SOLUTION INTRAVENOUS PRN
Status: DISCONTINUED | OUTPATIENT
Start: 2024-11-14 | End: 2024-11-14 | Stop reason: HOSPADM

## 2024-11-14 RX ADMIN — KETOROLAC TROMETHAMINE 30 MG: 30 INJECTION, SOLUTION INTRAMUSCULAR at 05:25

## 2024-11-14 RX ADMIN — ACETAMINOPHEN 650 MG: 325 TABLET ORAL at 05:25

## 2024-11-14 RX ADMIN — WATER 1000 MG: 1 INJECTION INTRAMUSCULAR; INTRAVENOUS; SUBCUTANEOUS at 02:38

## 2024-11-14 RX ADMIN — HYDROMORPHONE HYDROCHLORIDE 1 MG: 1 INJECTION, SOLUTION INTRAMUSCULAR; INTRAVENOUS; SUBCUTANEOUS at 00:28

## 2024-11-14 RX ADMIN — TAMSULOSIN HYDROCHLORIDE 0.4 MG: 0.4 CAPSULE ORAL at 09:35

## 2024-11-14 RX ADMIN — SODIUM CHLORIDE: 9 INJECTION, SOLUTION INTRAVENOUS at 02:43

## 2024-11-14 RX ADMIN — HYDROMORPHONE HYDROCHLORIDE 2 MG: 1 INJECTION, SOLUTION INTRAMUSCULAR; INTRAVENOUS; SUBCUTANEOUS at 01:20

## 2024-11-14 RX ADMIN — SODIUM CHLORIDE, PRESERVATIVE FREE 10 ML: 5 INJECTION INTRAVENOUS at 09:35

## 2024-11-14 ASSESSMENT — PAIN - FUNCTIONAL ASSESSMENT: PAIN_FUNCTIONAL_ASSESSMENT: 0-10

## 2024-11-14 ASSESSMENT — PAIN SCALES - GENERAL
PAINLEVEL_OUTOF10: 10
PAINLEVEL_OUTOF10: 10
PAINLEVEL_OUTOF10: 0
PAINLEVEL_OUTOF10: 0
PAINLEVEL_OUTOF10: 1

## 2024-11-14 ASSESSMENT — PAIN DESCRIPTION - LOCATION
LOCATION: FLANK;BACK;ABDOMEN
LOCATION: ABDOMEN;BACK;FLANK
LOCATION: FLANK

## 2024-11-14 ASSESSMENT — PAIN DESCRIPTION - ORIENTATION
ORIENTATION: RIGHT
ORIENTATION: LEFT
ORIENTATION: LEFT

## 2024-11-14 NOTE — CARE COORDINATION
11/14/24 1132   Service Assessment   Patient Orientation Alert and Oriented   Cognition Alert   History Provided By Patient   Primary Caregiver Self   Support Systems Family Members;Children   Patient's Healthcare Decision Maker is: Legal Next of Kin   PCP Verified by CM Yes   Last Visit to PCP Within last 3 months   Prior Functional Level Independent in ADLs/IADLs   Current Functional Level Independent in ADLs/IADLs   Can patient return to prior living arrangement Yes   Ability to make needs known: Good   Family able to assist with home care needs: Yes   Would you like for me to discuss the discharge plan with any other family members/significant others, and if so, who? Yes     Advance Care Planning     General Advance Care Planning (ACP) Conversation    Date of Conversation: 11/14/2024  Conducted with: Patient with Decision Making Capacity  Other persons present: None    Healthcare Decision Maker:   Primary Decision Maker: Debbie Escobar - [facility] - [phone]         Length of Voluntary ACP Conversation in minutes:  <16 minutes (Non-Billable)    Mikey Eller RN         CM met with patient at bedside to discuss discharge planning assessment. Patient [place] with her fiance and children.  Patient is independent with no DME usage.  Patient declined the need for equipment of services at home.  Current disposition is home. Patient will drive self home.     Transition of Care Plan:    RUR: 6%  Prior Level of Functioning: IND  Disposition: Home  RUSSELL: Today  If SNF or IPR: Date FOC offered:   Date FOC received:   Accepting facility:   Date authorization started with reference number:   Date authorization received and expires:   Follow up appointments:   DME needed: None  Transportation at discharge: Self  IM/IMM Medicare/ letter given: NA  Is patient a  and connected with VA? No   If yes, was  transfer form completed and VA notified? NA  Caregiver Contact:   Discharge Caregiver

## 2024-11-14 NOTE — PROGRESS NOTES
4 Eyes Skin Assessment     NAME:  Shy Duarte  YOB: 1985  MEDICAL RECORD NUMBER:  [id number]    The patient is being assessed for  Admission    I agree that at least one RN has performed a thorough Head to Toe Skin Assessment on the patient. ALL assessment sites listed below have been assessed.      Areas assessed by both nurses:    Head, Face, Ears, Shoulders, Back, Chest, Arms, Elbows, Hands, Sacrum. Buttock, Coccyx, Ischium, Legs. Feet and Heels, and Under Medical Devices         Does the Patient have a Wound? No noted wound(s)       Kirt Prevention initiated by RN: No  Wound Care Orders initiated by RN: No    Pressure Injury (Stage 3,4, Unstageable, DTI, NWPT, and Complex wounds) if present, place Wound referral order by RN under : No    New Ostomies, if present place, Ostomy referral order under : No     Nurse 1 eSignature: Electronically signed by Dharmesh Almaraz RN on 11/14/24 at 6:11 AM EST    **SHARE this note so that the co-signing nurse can place an eSignature**    Nurse 2 eSignature: Electronically signed by Mira Rubio RN on 11/14/24 at 6:21 AM EST

## 2024-11-14 NOTE — ED NOTES
ED TO INPATIENT SBAR HANDOFF    Patient Name: Shy Duarte   Preferred Name: Shy  : 1985  39 y.o.   Family/Caregiver Present: no   Code Status Order: Full Code  PO Status: NPO  Telemetry Order:   C-SSRS: Risk of Suicide: No Risk  Sitter none  Restraints:     Sepsis Risk Score      Situation  Chief Complaint   Patient presents with    Flank Pain     Left     Brief Description of Patient's Condition: Patient arrived to the ED with c/o of left flank pain w/ lower abdominal. Patient alert and wake; able to ambulate w/o assistance. Patient has hx of kidney stones.     Mental Status: oriented, alert, coherent, logical, thought processes intact, and able to concentrate and follow conversation  Arrived from:Home  Imaging:   CT ABDOMEN PELVIS WO CONTRAST Additional Contrast? None   Final Result   Mild left sided mild hydroureteronephrosis with normal 7 mm calculus in the   distal left ureter.      There are bilateral nonobstructive renal calculi.          Incidental and/or nonemergent findings are as described in detail above.         Electronically signed by YOVANY BANUELOS        Abnormal labs:   Abnormal Labs Reviewed   URINALYSIS WITH MICROSCOPIC - Abnormal; Notable for the following components:       Result Value    Protein,  (*)     Blood, Urine Small (*)     Leukocyte Esterase, Urine Moderate (*)     Epithelial Cells, UA Many (*)     Mucus, UA 1+ (*)     All other components within normal limits   CBC WITH AUTO DIFFERENTIAL - Abnormal; Notable for the following components:    WBC 12.3 (*)     Neutrophils % 79 (*)     Monocytes % 4 (*)     Immature Granulocytes % 1 (*)     Neutrophils Absolute 9.9 (*)     Immature Granulocytes Absolute 0.1 (*)     All other components within normal limits       Background  Allergies:   Allergies   Allergen Reactions    Amoxicillin      Other Reaction(s): THRUSH    Clavulanic Acid      Other Reaction(s): THRUSH    Levaquin [Levofloxacin] Other (See Comments)     Thrush

## 2024-11-14 NOTE — H&P
2024    Provider, MD Nikita   ketorolac (TORADOL) 10 MG tablet Take 1 tablet by mouth every 6 hours as needed for Pain 24  Angel Girard MD   acetaminophen (TYLENOL) 500 MG tablet Take 2 tablets by mouth every 6 hours as needed for Pain  Patient not taking: Reported on 2024   Angel Girard MD      PMHx:  has a past medical history of Hyperthyroidism and Kidney calculi.   PSurgHx:  has a past surgical history that includes Urological Surgery (Left, 10/27/2022); Urological Surgery (Left, 10/13/2022); Urological Surgery (Right, 2022); Urological Surgery (2022); other surgical history (); Urological Surgery (2022); Urological Surgery (10/27/2021); Urological Surgery (10/12/2021); Urological Surgery (10/12/2021); Urological Surgery (10/12/2021); Lithotripsy; Lithotripsy (); Tubal ligation ();  section (); and Hysterotomy (2024).  PSocHx:  reports that she has been smoking cigarettes. She has never used smokeless tobacco. She reports current alcohol use. She reports that she does not use drugs.   FamHx:   Family History   Problem Relation Age of Onset    Diabetes Paternal Grandmother     Uterine Cancer Maternal Grandmother      ROS:  Admission ROS by Rupesh Orona MD from 2024 were reviewed with the patient and changes (other than per HPI) include: none.    Physical Exam:            Vitals::    Temp (24hrs), Av.8 °F (36.6 °C), Min:97.7 °F (36.5 °C), Max:97.9 °F (36.6 °C)   Blood pressure (!) 155/71, pulse 60, temperature 97.9 °F (36.6 °C), temperature source Oral, resp. rate 18, height 1.651 m (5' 5\"), weight 102.1 kg (225 lb), last menstrual period 2024, SpO2 98%.   Estimated body mass index is 37.44 kg/m² as calculated from the following:    Height as of this encounter: 1.651 m (5' 5\").    Weight as of this encounter: 102.1 kg (225 lb).             I&O's:    701 - 1900  In: 225 [I.V.:225]  Out:

## 2024-11-14 NOTE — DISCHARGE SUMMARY
Discharge Summary    Date: 11/14/2024  Patient Name: Shy Duarte    YOB: 1985     Age: 39 y.o.    Admit Date: 11/13/2024  Discharge Date: 11/14/2024  Discharge Condition: Good    Admission Diagnosis  Kidney stone [N20.0]      Discharge Diagnosis  Principal Problem (Resolved):    Kidney stone  Active Problems:    * No active hospital problems. *      Hospital Stay  Narrative of Hospital Course:  39 year old female admitted for kidney stone due to refractory pain. She spontaneously passed her stone overnight and is feeling well this AM. Plan for discharge home with follow up as needed.         Consultants:  None    Surgeries/procedures Performed:  None     Treatments:    Analgesia, Antibiotics and IV Hydration    Acetaminophen and Morphine, Ceftriaxone    Discharge Plan/Disposition:  Home    Hospital/Incidental Findings Requiring Follow Up:    Patient Instructions:    Diet: Regular Diet    Activity:Activity as Tolerated  For number of days (if applicable):      Other Instructions:     Physical Examination: General appearance - alert, well appearing, and in no distress  Mental status - alert, oriented to person, place, and time  Heart - normal rate, regular rhythm, normal S1, S2, no murmurs, rubs, clicks or gallops  Abdomen - soft, nontender, nondistended, no masses or organomegaly  Musculoskeletal - no joint tenderness, deformity or swelling     Provider Follow-Up:   No follow-ups on file.     Significant Diagnostic Studies:    Recent Labs:  Admission on 11/13/2024  Color, UA                                     Date: 11/13/2024  Value: Yellow/Straw                     Ref range:                    Status: Final                Comment: Color Reference Range: Straw, Yellow or Dark Yellow  Appearance                                    Date: 11/13/2024  Value: Clear       Ref range: Clear              Status: Final  Specific [place], UA                          Date: 11/13/2024  Value: 1.025       Ref

## 2024-11-14 NOTE — ED PROVIDER NOTES
[facility] EMERGENCY DEPT  EMERGENCY DEPARTMENT HISTORY AND PHYSICAL EXAM      Date: 2024  Patient Name: Shy Duarte  MRN: [id number]  Birthdate 1985  Date of evaluation: 2024  Provider: Kalee Wong MD   Note Started: 10:21 PM EST 24    HISTORY OF PRESENT ILLNESS     Chief Complaint   Patient presents with    Flank Pain     Left       History Provided By: Patient    HPI: Shy Duarte is a 39 y.o. female patient presents with flank pain left-sided mild ache over the weekend and thought she was going to have a kidney stone to pass.  Muscle throat.  Pain however became very intense today severe 10 out of 10.  Left left lower quadrant.  Nauseated.  Prior history of kidney stones most recent episode 1 year ago.  Multiple in the past but has had lithotripsy    PAST MEDICAL HISTORY   Past Medical History:  Past Medical History:   Diagnosis Date    Hyperthyroidism     Kidney calculi        Past Surgical History:  Past Surgical History:   Procedure Laterality Date     SECTION      HYSTEROTOMY  2024    LITHOTRIPSY      LITHOTRIPSY      \"multiple times but 2017 was most recent\"    OTHER SURGICAL HISTORY  2020    intrauterine device (IUD)    TUBAL LIGATION  2010    UROLOGICAL SURGERY Left 10/27/2022    Left  Extracorporeal shockwave lithotripsy    UROLOGICAL SURGERY Left 10/13/2022    Cystoscopy with double-J stent placement on the left    UROLOGICAL SURGERY Right 2022    right diagnostic ureteroscopy    UROLOGICAL SURGERY  2022    Right retrograde,     UROLOGICAL SURGERY  2022    cystoscopy    UROLOGICAL SURGERY  10/27/2021    cystoscopy stent remove    UROLOGICAL SURGERY  10/12/2021    LEFT URETERAL STENT PLACEMENT     UROLOGICAL SURGERY  10/12/2021    BILATERAL RETROGRADE PYELOGRAM;     UROLOGICAL SURGERY  10/12/2021    CYSTOSCOPY, URETEROSCOPY WITH LASER LITHOTRIPSY,        Family History:  Family History   Problem Relation Age of Onset    Diabetes Paternal

## 2024-11-14 NOTE — PLAN OF CARE
Problem: Discharge Planning  Goal: Discharge to home or other facility with appropriate resources  11/14/2024 1144 by Melchor Ortez RN  Outcome: Adequate for Discharge  11/14/2024 1143 by Melchor Ortez RN  Outcome: Progressing  11/14/2024 0340 by Dharmesh Almaraz RN  Outcome: Progressing  Flowsheets (Taken 11/14/2024 0320)  Discharge to home or other facility with appropriate resources: Identify barriers to discharge with patient and caregiver     Problem: Pain  Goal: Verbalizes/displays adequate comfort level or baseline comfort level  11/14/2024 1144 by Melchor Ortez RN  Outcome: Adequate for Discharge  11/14/2024 1143 by Melchor Ortez RN  Outcome: Progressing  11/14/2024 0340 by Dharmesh Almaraz RN  Outcome: Progressing  Flowsheets (Taken 11/14/2024 0320)  Verbalizes/displays adequate comfort level or baseline comfort level: Assess pain using appropriate pain scale     Problem: Safety - Adult  Goal: Free from fall injury  11/14/2024 1144 by Melchor Ortez RN  Outcome: Adequate for Discharge  11/14/2024 1143 by Melchor Ortez RN  Outcome: Progressing  11/14/2024 0340 by Dharmesh Almaraz RN  Outcome: Progressing

## 2024-11-14 NOTE — ED TRIAGE NOTES
Pt arrives with complaints of left flank pain and left lower back pain x4 days with increasing pain today. Pt reports still able to pass urine but amount decrease.     Hx kidney stones (usually able to pass on own)

## 2024-11-17 LAB
BACTERIA SPEC CULT: NORMAL
BACTERIA SPEC CULT: NORMAL
Lab: NORMAL
Lab: NORMAL

## 2024-11-18 ENCOUNTER — OFFICE VISIT (OUTPATIENT)
Age: 39
End: 2024-11-18
Payer: COMMERCIAL

## 2024-11-18 VITALS
TEMPERATURE: 98.7 F | HEIGHT: 65 IN | BODY MASS INDEX: 40.75 KG/M2 | HEART RATE: 83 BPM | WEIGHT: 244.6 LBS | DIASTOLIC BLOOD PRESSURE: 112 MMHG | SYSTOLIC BLOOD PRESSURE: 178 MMHG | RESPIRATION RATE: 16 BRPM | OXYGEN SATURATION: 98 %

## 2024-11-18 DIAGNOSIS — E66.813 CLASS 3 SEVERE OBESITY DUE TO EXCESS CALORIES WITHOUT SERIOUS COMORBIDITY WITH BODY MASS INDEX (BMI) OF 40.0 TO 44.9 IN ADULT: Primary | ICD-10-CM

## 2024-11-18 DIAGNOSIS — E66.01 CLASS 3 SEVERE OBESITY DUE TO EXCESS CALORIES WITHOUT SERIOUS COMORBIDITY WITH BODY MASS INDEX (BMI) OF 40.0 TO 44.9 IN ADULT: Primary | ICD-10-CM

## 2024-11-18 DIAGNOSIS — R79.89 LOW VITAMIN D LEVEL: ICD-10-CM

## 2024-11-18 PROCEDURE — 99214 OFFICE O/P EST MOD 30 MIN: CPT | Performed by: STUDENT IN AN ORGANIZED HEALTH CARE EDUCATION/TRAINING PROGRAM

## 2024-11-18 RX ORDER — ERGOCALCIFEROL 1.25 MG/1
50000 CAPSULE, LIQUID FILLED ORAL
Qty: 6 CAPSULE | Refills: 0 | Status: SHIPPED | OUTPATIENT
Start: 2024-11-18

## 2024-11-18 NOTE — PROGRESS NOTES
\"Have you been to the ER, urgent care clinic since your last visit?  Hospitalized since your last visit?\"    YES - When: approximately 11/13/2024 ago.  Where and Why: [facility] for kidney stones.    “Have you seen or consulted any other health care providers outside our system since your last visit?”    NO     “Have you had a pap smear?”    NO- had hysterectomy 7/2024    No cervical cancer screening on file     Chief Complaint   Patient presents with    Follow-up     Low vitamin D     BP (!) 178/112 (Site: Right Lower Arm, Position: Sitting, Cuff Size: Medium Adult)   Pulse 83   Temp 98.7 °F (37.1 °C) (Temporal)   Resp 16   Ht 1.651 m (5' 5\")   Wt 110.9 kg (244 lb 9.6 oz)   LMP 03/22/2024   SpO2 98%   BMI 40.70 kg/m²          
  Took 4 week course of weekly vitamin d prescribed by pcp  Re prescribed vitamin d     Orders Placed This Encounter   Procedures    Amb Referral to Nutrition Services     Referral Priority:   Routine     Referral Type:   Eval and Treat     Referral Reason:   Specialty Services Required     Requested Specialty:   Dietitian Registered     Number of Visits Requested:   1       3. Obesity   Discussed lifestyle measures.   Provided referral to nutritionist and discussed weight loss medication options   Consider pharmacological options         3. Thyroid nodule   Us 2/24 1.2 x 0.6 x 0.8 cm right thyroid lobe nodule \  Repeat in 1 year     Total time spent in chart review, patient encounter, counseling and note writing is equal to  31  minutes     RTC in 3 months     I have discussed the diagnosis with the patient and the intended plan .  The patient has received an after-visit summary and questions were answered concerning future plans.  I have discussed medication side effects .    Thank you for allowing me to participate in the care of this patient.    Brandy Walton      There are no Patient Instructions on file for this visit.  No follow-up provider specified.          Patient /caregiver verbalized understanding .  Voice-recognition software was used to generate this report, which may result in some phonetic-based errors in the grammar and contents.  Even though attempts were made to correct all the mistakes, some may have been missed and remained in the body of the report.

## 2024-11-20 LAB
BACTERIA SPEC CULT: NORMAL
BACTERIA SPEC CULT: NORMAL
Lab: NORMAL
Lab: NORMAL

## 2025-01-22 ENCOUNTER — PATIENT MESSAGE (OUTPATIENT)
Age: 40
End: 2025-01-22

## 2025-01-22 DIAGNOSIS — R79.89 LOW VITAMIN D LEVEL: Primary | ICD-10-CM

## 2025-01-27 DIAGNOSIS — R79.89 LOW VITAMIN D LEVEL: ICD-10-CM

## 2025-03-10 ENCOUNTER — OFFICE VISIT (OUTPATIENT)
Age: 40
End: 2025-03-10
Payer: COMMERCIAL

## 2025-03-10 VITALS
HEIGHT: 65 IN | BODY MASS INDEX: 41.7 KG/M2 | RESPIRATION RATE: 16 BRPM | SYSTOLIC BLOOD PRESSURE: 161 MMHG | OXYGEN SATURATION: 98 % | HEART RATE: 91 BPM | WEIGHT: 250.3 LBS | DIASTOLIC BLOOD PRESSURE: 90 MMHG | TEMPERATURE: 98.6 F

## 2025-03-10 DIAGNOSIS — E66.01 CLASS 3 SEVERE OBESITY DUE TO EXCESS CALORIES WITHOUT SERIOUS COMORBIDITY WITH BODY MASS INDEX (BMI) OF 40.0 TO 44.9 IN ADULT: ICD-10-CM

## 2025-03-10 DIAGNOSIS — R03.0 ELEVATED BP WITHOUT DIAGNOSIS OF HYPERTENSION: ICD-10-CM

## 2025-03-10 DIAGNOSIS — R79.89 LOW VITAMIN D LEVEL: ICD-10-CM

## 2025-03-10 DIAGNOSIS — E66.813 CLASS 3 SEVERE OBESITY DUE TO EXCESS CALORIES WITHOUT SERIOUS COMORBIDITY WITH BODY MASS INDEX (BMI) OF 40.0 TO 44.9 IN ADULT: ICD-10-CM

## 2025-03-10 DIAGNOSIS — E04.1 THYROID NODULE: Primary | ICD-10-CM

## 2025-03-10 DIAGNOSIS — E11.65 TYPE 2 DIABETES MELLITUS WITH HYPERGLYCEMIA, WITHOUT LONG-TERM CURRENT USE OF INSULIN (HCC): ICD-10-CM

## 2025-03-10 PROCEDURE — 99214 OFFICE O/P EST MOD 30 MIN: CPT | Performed by: STUDENT IN AN ORGANIZED HEALTH CARE EDUCATION/TRAINING PROGRAM

## 2025-03-10 RX ORDER — ERGOCALCIFEROL 1.25 MG/1
50000 CAPSULE, LIQUID FILLED ORAL
Qty: 6 CAPSULE | Refills: 0 | Status: SHIPPED | OUTPATIENT
Start: 2025-03-10

## 2025-03-10 RX ORDER — ATORVASTATIN CALCIUM 40 MG/1
40 TABLET, FILM COATED ORAL DAILY
COMMUNITY
Start: 2025-02-21

## 2025-03-10 NOTE — PROGRESS NOTES
\"Have you been to the ER, urgent care clinic since your last visit?  Hospitalized since your last visit?\"    NO    “Have you seen or consulted any other health care providers outside our system since your last visit?”    YES - When: approximately 2 weeks ago ago.  Where and Why: PCP for follow up.      Chief Complaint   Patient presents with    Follow-up     Obesity  Low Vitamin D  Hyperthyroidism     BP (!) 161/90 (BP Site: Right Upper Arm, Patient Position: Sitting, BP Cuff Size: Large Adult)   Pulse 91   Temp 98.6 °F (37 °C) (Temporal)   Resp 16   Ht 1.651 m (5' 5\")   Wt 113.5 kg (250 lb 4.8 oz)   LMP 03/22/2024   SpO2 98%   BMI 41.65 kg/m²

## 2025-03-10 NOTE — PROGRESS NOTES
[facility]                                                                                    Brandy Walton M.D          Patient Information  Date:3/11/2025  Name : Shy Duarte 39 y.o.     YOB: 1985         Referred by: Shawna Brar MD       Chief Complaint   Patient presents with    Follow-up     Obesity  Low Vitamin D  Hyperthyroidism       History of Present Illness: Shy Duarte is a 39 y.o. female with history of kidney stones  who presented to follow up      3- presents for follow. Dx'ed with diabetes. Asking about weight loss meds         24 hour recall :dinner : spinach, lettuce, eggs, cucumber tomatoes, ,   Walking -  No weight loss     24 presents for follow up.     Worried about weight gain   24 hour recall : chicken, fried chicked corn mashed potatoes   Tea- sweet tea   Lunch- left over soup   Breakfast : none          24   Had hysterectomy  Reports weight gain.         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           Past Medical History:   Diagnosis Date    Diabetes mellitus (HCC)     Hyperthyroidism     Kidney calculi     Vitamin D deficiency        Past Surgical History:   Procedure Laterality Date     SECTION      LAPAROSCOPIC HYSTERECTOMY  2024    LITHOTRIPSY      LITHOTRIPSY  2017    \"multiple times but  was most recent\"    OTHER SURGICAL HISTORY

## 2025-04-10 ENCOUNTER — OFFICE VISIT (OUTPATIENT)
Age: 40
End: 2025-04-10
Payer: COMMERCIAL

## 2025-04-10 DIAGNOSIS — E11.65 TYPE 2 DIABETES MELLITUS WITH HYPERGLYCEMIA, WITHOUT LONG-TERM CURRENT USE OF INSULIN (HCC): Primary | ICD-10-CM

## 2025-04-10 PROCEDURE — G0108 DIAB MANAGE TRN  PER INDIV: HCPCS | Performed by: DIETITIAN, REGISTERED

## 2025-04-10 NOTE — PROGRESS NOTES
pattern: No      Stage of change: Preparation      Note: Content derived from the American Association of Diabetes Educators' Diabetes Education Curriculum: A Guide to Successful Self-Management (3rd edition)    Shwetha Valdivia MS, RDN, [facility] on 4/10/2025 at 10:00 AM      I have personally reviewed the health record, including provider notes, laboratory data and current medications before making these care and education recommendations. The time spent in this effort is included in the total time.    Total minutes: 30 minutes

## 2025-05-07 ENCOUNTER — CLINICAL SUPPORT (OUTPATIENT)
Age: 40
End: 2025-05-07
Payer: COMMERCIAL

## 2025-05-07 DIAGNOSIS — E11.9 TYPE 2 DIABETES MELLITUS WITHOUT COMPLICATION, WITHOUT LONG-TERM CURRENT USE OF INSULIN (HCC): Primary | ICD-10-CM

## 2025-05-07 PROCEDURE — G0109 DIAB MANAGE TRN IND/GROUP: HCPCS

## 2025-05-07 NOTE — PROGRESS NOTES
[facility]  Diabetes Self-Management Education & Support Program  Encounter Note      SUMMARY  Diabetes self-care management training was completed related to reducing risks. The participant will return on May 15 to continue DSMES related to healthy eating and monitoring. The participant did identify SMART Goal(s) and will practice knowledge and skills related to reducing risks to improve diabetes self-management.        EVALUATION:  Participant was engaged in learning and shared in group discussions.  She shared that she is newly diagnosed with diabetes.  She shared that she has had some vaccines but did not take COVID or flu vaccines.  She has an appointment for annual eye exam in June.  She is up to date on annual dental and will make an appointment for annual foot exam.  She likes to spend time with her cat for stress reduction, as well as doing crafts.        RECOMMENDATIONS:  Personal care record   Make appointment for foot exam  TOPICS DISCUSSED TODAY:  WHAT IS DIABETES? Minutes: 60  HOW DO I STAY HEALTHY? 60      Next provider visit is scheduled for   Future Appointments         Provider Specialty Dept Phone    5/14/2025 2:00 PM DIABETES GROUP CLASS [facility] [phone]    5/21/2025 2:00 PM DIABETES GROUP CLASS [facility] [phone]    5/28/2025 2:00 PM DIABETES GROUP CLASS [facility] [phone]    6/18/2025 3:45 PM (Arrive by 3:30 PM) Isabel Johnson MD Endocrinology [phone]                SMART GOAL(S)   Make appointment for annual foot exam.  ACHIEVEMENT OF GOAL(S) : 0-24%         DATE DSMES TOPIC EVALUATION     5/7/2025 WHAT IS DIABETES?   Role of the normal pancreas in energy balance and blood glucose control   The defect seen in diabetes   Signs & symptoms of diabetes   Diagnosis of diabetes   Types of diabetes   Blood glucose targets in non-pregnant & non-pregnant adults       The participant knows  Their type of

## 2025-05-14 ENCOUNTER — CLINICAL SUPPORT (OUTPATIENT)
Age: 40
End: 2025-05-14
Payer: COMMERCIAL

## 2025-05-14 DIAGNOSIS — E11.65 TYPE 2 DIABETES MELLITUS WITH HYPERGLYCEMIA, WITHOUT LONG-TERM CURRENT USE OF INSULIN (HCC): Primary | ICD-10-CM

## 2025-05-14 PROCEDURE — G0109 DIAB MANAGE TRN IND/GROUP: HCPCS | Performed by: DIETITIAN, REGISTERED

## 2025-05-14 NOTE — PROGRESS NOTES
[facility]  Diabetes Self-Management Education & Support Program  Encounter Note      SUMMARY  Diabetes self-care management training was completed related to healthy eating and monitoring. The participant will return on May 21 to continue DSMES related to taking medications and physical activity. The participant did identify SMART Goal(s) and will practice knowledge and skills related to reducing risks, healthy eating, and monitoring to improve diabetes self-management.        EVALUATION:  Ms Duarte demonstrated excellent understanding of CHO counting. She participated in class activity and designed a balanced 45 g CHO meal    RECOMMENDATIONS:  Design 45 g CHO balanced meals using Healthy plate template  Monitor FBG and 2H past largest meal to assess for target     TOPICS DISCUSSED TODAY:  WHAT CAN I EAT? 60  HOW CAN BLOOD GLUCOSE MONITORING HELP ME? 60      Next provider visit is scheduled for   Future Appointments       6/18/2025 3:45 PM (Arrive by 3:30 PM) Isabel Johnson MD Endocrinology [phone]        SMART GOAL(S)  Make appointment for annual foot exam.   ACHIEVEMENT OF GOAL(S) : %    2.    Practice label reading to identify total CHO per serving over next week.  ACHIEVEMENT OF GOAL(S) :  0-24%       DATE DSMES TOPIC EVALUATION     5/14/2025 WHAT CAN I EAT?   General principles   Determining a healthy weight   Nutritional terms & tools   Healthy Plate method   Carbohydrate Counting   Reading food labels   Free apps        The participant can  Use Healthy Plate principles in constructing meals: Yes  Read food labels in choosing acceptable foods: Yes    The participant needs to address identifying CHO sources, practice label reading and use healthy plate for designing balanced meal.       DATE DSMES TOPIC EVALUATION     5/14/2025 HOW CAN BLOOD GLUCOSE MONITORING HELP ME?   Value of blood glucose monitoring   Realistic expectations   Blood glucose monitoring targets   Target

## 2025-05-28 ENCOUNTER — CLINICAL SUPPORT (OUTPATIENT)
Age: 40
End: 2025-05-28
Payer: COMMERCIAL

## 2025-05-28 DIAGNOSIS — E11.65 TYPE 2 DIABETES MELLITUS WITH HYPERGLYCEMIA, WITHOUT LONG-TERM CURRENT USE OF INSULIN (HCC): Primary | ICD-10-CM

## 2025-05-28 PROCEDURE — G0109 DIAB MANAGE TRN IND/GROUP: HCPCS | Performed by: DIETITIAN, REGISTERED

## 2025-05-29 NOTE — PROGRESS NOTES
[facility]  Diabetes Self-Management Education & Support Program  Encounter Note      SUMMARY  Diabetes self-care management training was completed related to healthy coping and problem solving. The participant will return on June 18 to continue DSMES related to taking medications and physical activity. The participant did not identify SMART Goal(s) and will practice knowledge and skills related to reducing risks, healthy eating, monitoring, healthy coping, and problem solving to improve diabetes self-management.        EVALUATION:  Ms Duarte shared that she learning a lot about diabetes. Taking the classes has given her hope that she can live well with diabetes. She participated in stress reduction activity: guided meditation. She demonstrated good critical thinking skills during pattern identification exercise. She offered good solutions in problem solving exercises. She identified with the example of sometimes forgetting to take evening medications. She worked out a plan to help tackle her issue. She plans to set an alarm on her phone to help her remember to take her evening medication. She will also use sticky notes to help her remember to take the evening meds. Ms Duarte was also encouraged to continue to use SMART goals to tackle common self care concerns.    RECOMMENDATIONS:  Obtain medical alert ID  Log and track BG on log sheet to identify BG anomalies  Practice problem solving skills to create plans for tackling common self care issues  Make up Class 3 A/B    TOPICS DISCUSSED TODAY:  HOW DO I FIND SUPPORT TO TACKLE THIS CONDITION? 60  HOW DO I FIGURE OUT WHAT'S INFLUENCING MY BLOOD GLUCOSES? 60      Next provider visit is scheduled for   Future Appointments         Provider Specialty Dept Phone    6/18/2025 3:45 PM (Arrive by 3:30 PM) Isabel Johnson MD Endocrinology [phone]          DATE DSMES TOPIC EVALUATION     5/28/2025 HOW DO I FIND SUPPORT TO TACKLE THIS CONDITION?

## 2025-06-18 ENCOUNTER — CLINICAL SUPPORT (OUTPATIENT)
Age: 40
End: 2025-06-18
Payer: COMMERCIAL

## 2025-06-18 ENCOUNTER — OFFICE VISIT (OUTPATIENT)
Age: 40
End: 2025-06-18
Payer: COMMERCIAL

## 2025-06-18 VITALS
BODY MASS INDEX: 41.07 KG/M2 | OXYGEN SATURATION: 98 % | WEIGHT: 246.5 LBS | HEIGHT: 65 IN | TEMPERATURE: 98.2 F | RESPIRATION RATE: 16 BRPM | DIASTOLIC BLOOD PRESSURE: 76 MMHG | HEART RATE: 87 BPM | SYSTOLIC BLOOD PRESSURE: 135 MMHG

## 2025-06-18 DIAGNOSIS — R79.89 ABNORMAL THYROID BLOOD TEST: ICD-10-CM

## 2025-06-18 DIAGNOSIS — E11.65 TYPE 2 DIABETES MELLITUS WITH HYPERGLYCEMIA, UNSPECIFIED WHETHER LONG TERM INSULIN USE (HCC): ICD-10-CM

## 2025-06-18 DIAGNOSIS — R79.89 ABNORMAL THYROID BLOOD TEST: Primary | ICD-10-CM

## 2025-06-18 DIAGNOSIS — E55.9 VITAMIN D DEFICIENCY: ICD-10-CM

## 2025-06-18 DIAGNOSIS — E11.65 TYPE 2 DIABETES MELLITUS WITH HYPERGLYCEMIA, WITHOUT LONG-TERM CURRENT USE OF INSULIN (HCC): Primary | ICD-10-CM

## 2025-06-18 PROCEDURE — G0109 DIAB MANAGE TRN IND/GROUP: HCPCS | Performed by: DIETITIAN, REGISTERED

## 2025-06-18 PROCEDURE — 99214 OFFICE O/P EST MOD 30 MIN: CPT | Performed by: STUDENT IN AN ORGANIZED HEALTH CARE EDUCATION/TRAINING PROGRAM

## 2025-06-18 RX ORDER — BLOOD SUGAR DIAGNOSTIC
STRIP MISCELLANEOUS
COMMUNITY
Start: 2025-04-12

## 2025-06-18 RX ORDER — BLOOD-GLUCOSE METER
EACH MISCELLANEOUS
COMMUNITY
Start: 2025-04-12

## 2025-06-18 RX ORDER — LANCETS 33 GAUGE
EACH MISCELLANEOUS
COMMUNITY
Start: 2025-04-12

## 2025-06-18 NOTE — PROGRESS NOTES
[facility]  Diabetes Self-Management Education & Support Program  Encounter Note      SUMMARY  Diabetes self-care management training was completed related to taking medications and physical activity. The participant will in six weeks for the post program evaluation. The participant will practice knowledge and skills related to reducing risks, healthy eating, monitoring, being active, medications, healthy coping, and problem solving to improve diabetes self-management.        EVALUATION:  Ms. Duarte shared that she has reduced her A1C from 7.0% to 6.1% since taking the classes. She denied experiencing any side effects from her medications. She takes Metformin consistently as to achieve the full benefit.   She has not started a PA program. She had to leave class 30 minutes early to attend Endocrinology appointment.     RECOMMENDATIONS:  Continue to practice DSM skills.   Practice Healthy Plate Guidelines/45 g CHO per meal  Start a walking program-aim for 150 minutes per week  Obtain clearance from MD to start Structured PA program     TOPICS DISCUSSED TODAY:  HOW DO MY DIABETES MEDICATIONS WORK? 60  HOW DOES PHYSICAL ACTIVITY AFFECT MY DIABETES? 30      Next provider visit is scheduled for   Future Appointments         Provider Specialty Dept Phone    9/30/2025 3:45 PM Isabel Johnson MD Endocrinology [phone]                 DATE DSMES TOPIC EVALUATION     6/18/2025 HOW DO MY DIABETES MEDICATIONS WORK?   Type 1 medications & methods   Insulin injections   Injection sites   Type 2 medications   Oral agents   GLP-1 agonists   Hypoglycemia symptoms & treatment   Glucagon emergency kits   General guidance regarding insulin use whether Type 1, 2 or gestational diabetes   Storage of insulin   Disposal    Traveling with medications   Barriers to medication adherence      The participant   Can describe the expected action & side effects of prescribed diabetes medications: Yes    The participant

## 2025-06-18 NOTE — PROGRESS NOTES
[facility]                                                                                           Patient Information  Date:2025  Name : Shy Duarte 39 y.o.     YOB: 1985         Referred by: Shawna Brar MD       Chief Complaint   Patient presents with    Establish Care    Thyroid Problem       History of Present Illness: Shy Duarte is a 39 y.o. female with history of kidney stones  who presented to follow up    2025: doing well, transitioning from Dr Walton;   3- presents for follow. Dx'ed with diabetes. Asking about weight loss meds     24 hour recall :dinner : spinach, lettuce, eggs, cucumber tomatoes, ,   Walking -  No weight loss     24 presents for follow up.     Worried about weight gain   24 hour recall : chicken, fried chicked corn mashed potatoes   Tea- sweet tea   Lunch- left over soup   Breakfast : none          24   Had hysterectomy  Reports weight gain.         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           Past Medical History:   Diagnosis Date    Diabetes mellitus (HCC)     Hyperthyroidism     Kidney calculi     Vitamin D deficiency        Past Surgical History:   Procedure Laterality Date     SECTION      LAPAROSCOPIC HYSTERECTOMY  2024    LITHOTRIPSY      LITHOTRIPSY  2017    \"multiple times but  was most recent\"    OTHER SURGICAL HISTORY      
Have you been to the ER, urgent care clinic since your last visit?  Hospitalized since your last visit?   NO    Have you seen or consulted any other health care providers outside our system since your last visit?   YES - When: approximately 1  weeks ago.  Where and Why: PCP.      “Have you had a diabetic eye exam?”    NO     No diabetic eye exam on file     Chief Complaint   Patient presents with    Establish Care    Thyroid Problem     /76 (BP Site: Right Upper Arm, Patient Position: Sitting, BP Cuff Size: Medium Adult)   Pulse 87   Temp 98.2 °F (36.8 °C) (Temporal)   Resp 16   Ht 1.651 m (5' 5\")   Wt 111.8 kg (246 lb 8 oz)   LMP 03/22/2024   SpO2 98%   BMI 41.02 kg/m²       
PAST SURGICAL HISTORY:  Cervical cerclage suture present in second trimester taken out 11/11/19    H/O eye surgery L. d/t strabismus

## (undated) DEVICE — GOWN,PREVENTION PLUS,XLN/XL,ST,24/CS: Brand: MEDLINE

## (undated) DEVICE — PAD,NON-ADHERENT,3X8,STERILE,LF,1/PK: Brand: MEDLINE

## (undated) DEVICE — TUBING, SUCTION, 1/4" X 12', STRAIGHT: Brand: MEDLINE

## (undated) DEVICE — CATHETER ETER URET 5FR L70CM 0038IN FLX OPN TIP NO SIDE EYE

## (undated) DEVICE — SHEATH ACCESS  SHORT 12FR 10FR 45CM PROXIS URETERAL

## (undated) DEVICE — GOWN,SIRUS,NONRNF,SETINSLV,XL,20/CS: Brand: MEDLINE

## (undated) DEVICE — SPONGE GZ W4XL4IN COT 12 PLY TYP VII WVN C FLD DSGN

## (undated) DEVICE — GARMENT,MEDLINE,DVT,INT,CALF,MED, GEN2: Brand: MEDLINE

## (undated) DEVICE — DRAPE EQUIP C ARM 74X42 IN MOB XR W/ TIE RUBBER BND LF

## (undated) DEVICE — FLUID MGMT SYS FLUENT KIT 6/PK

## (undated) DEVICE — SOLUTION SCRB 4OZ 4% CHG H2O AIDED FOR PREOPERATIVE SKIN

## (undated) DEVICE — SPONGE GZ W4XL4IN COT RADPQ HIGHLY ABSRB

## (undated) DEVICE — STRAP,POSITIONING,KNEE/BODY,FOAM,4X60": Brand: MEDLINE

## (undated) DEVICE — NITINOL STONE RETRIEVAL BASKET: Brand: ESCAPE

## (undated) DEVICE — RADIFOCUS GLIDEWIRE: Brand: GLIDEWIRE

## (undated) DEVICE — Device

## (undated) DEVICE — PAIRED WIRE HELICAL STONE RETRIEVAL BASKET: Brand: GEMINI

## (undated) DEVICE — CYSTO PACK: Brand: MEDLINE INDUSTRIES, INC.

## (undated) DEVICE — SOL IRR SOD CL 0.9% 3000ML --

## (undated) DEVICE — COVER LT HNDL PLAS RIG 1 PER PK

## (undated) DEVICE — CONTAINER,SPECIMEN,PNEU TUBE,4OZ,OR STRL: Brand: MEDLINE

## (undated) DEVICE — PREP PAD BNS: Brand: CONVERTORS

## (undated) DEVICE — TRAY PREP DRY W/ PREM GLV 2 APPL 6 SPNG 2 UNDPD 1 OVERWRAP

## (undated) DEVICE — VINYL ACETATE UROLOGICAL DRAINAGE BAG FOR GE/OEC SYSTEMS W/ 8MM PLUG - NON-STERILE: Brand: CUSTOM MEDICAL SPECIALTIES, INC.

## (undated) DEVICE — Z INACTIVE USE 2527070 DRAPE SURG W40XL44IN UNDERBUTTOCK SMS POLYPR W/ PCH BK DISP

## (undated) DEVICE — STERILE POLYISOPRENE POWDER-FREE SURGICAL GLOVES: Brand: PROTEXIS

## (undated) DEVICE — SOLUTION IRRIG 3000ML 0.9% SOD CHL USP UROMATIC PLAS CONT

## (undated) DEVICE — SEAL INSTR PRT SELF SEAL

## (undated) DEVICE — JELLY,LUBE,STERILE,FLIP TOP,TUBE,4-OZ: Brand: MEDLINE

## (undated) DEVICE — INFECTION CONTROL KIT SYS

## (undated) DEVICE — DEVICE TISS REMOVAL -- ORDER AS BX     APO CODE = DRP

## (undated) DEVICE — SOLUTION IRRIG 500ML STRL H2O NONPYROGENIC

## (undated) DEVICE — TOWEL SURG W17XL27IN STD BLU COT NONFENESTRATED PREWASHED

## (undated) DEVICE — MARKER,SKIN,WI/RULER AND LABELS: Brand: MEDLINE

## (undated) DEVICE — GLOVE ORANGE PI 7 1/2   MSG9075

## (undated) DEVICE — PACK,LITHOTOMY,PK I: Brand: MEDLINE

## (undated) DEVICE — PAD,SANITARY,11 IN,MAXI,N-STRL,IND WRAP: Brand: MEDLINE

## (undated) DEVICE — SET SEALS HYSTEROSCOPE DISP -- MYOSURE  EA=10

## (undated) DEVICE — ULNAR NERVE PROTECTOR FOAM POSITIONER: Brand: CARDINAL HEALTH

## (undated) DEVICE — CATHETER,URETHRAL,REDRUBBER,STRL,16FR: Brand: MEDLINE